# Patient Record
Sex: FEMALE | Race: WHITE | NOT HISPANIC OR LATINO | Employment: UNEMPLOYED | URBAN - METROPOLITAN AREA
[De-identification: names, ages, dates, MRNs, and addresses within clinical notes are randomized per-mention and may not be internally consistent; named-entity substitution may affect disease eponyms.]

---

## 2017-03-15 ENCOUNTER — HOSPITAL ENCOUNTER (OUTPATIENT)
Facility: HOSPITAL | Age: 67
Discharge: HOME HEALTH | End: 2017-03-24
Attending: PHYSICAL MEDICINE & REHABILITATION | Admitting: PHYSICAL MEDICINE & REHABILITATION

## 2017-03-16 LAB
ALBUMIN SERPL BCP-MCNC: 2.8 G/DL (ref 3.5–5)
ALP SERPL-CCNC: 104 U/L (ref 46–116)
ALT SERPL W P-5'-P-CCNC: 57 U/L (ref 12–78)
ANION GAP SERPL CALCULATED.3IONS-SCNC: 8 MMOL/L (ref 4–13)
AST SERPL W P-5'-P-CCNC: 51 U/L (ref 5–45)
BASOPHILS # BLD AUTO: 0.04 THOUSANDS/ΜL (ref 0–0.1)
BASOPHILS NFR BLD AUTO: 1 % (ref 0–1)
BILIRUB SERPL-MCNC: 0.9 MG/DL (ref 0.2–1)
BUN SERPL-MCNC: 14 MG/DL (ref 5–25)
CALCIUM SERPL-MCNC: 9.1 MG/DL (ref 8.3–10.1)
CHLORIDE SERPL-SCNC: 101 MMOL/L (ref 100–108)
CO2 SERPL-SCNC: 28 MMOL/L (ref 21–32)
CREAT SERPL-MCNC: 0.64 MG/DL (ref 0.6–1.3)
EOSINOPHIL # BLD AUTO: 0.28 THOUSAND/ΜL (ref 0–0.61)
EOSINOPHIL NFR BLD AUTO: 3 % (ref 0–6)
ERYTHROCYTE [DISTWIDTH] IN BLOOD BY AUTOMATED COUNT: 14.1 % (ref 11.6–15.1)
EST. AVERAGE GLUCOSE BLD GHB EST-MCNC: 114 MG/DL
GFR SERPL CREATININE-BSD FRML MDRD: >60 ML/MIN/1.73SQ M
GLUCOSE SERPL-MCNC: 122 MG/DL (ref 65–140)
HBA1C MFR BLD: 5.6 % (ref 4.2–6.3)
HCT VFR BLD AUTO: 32.7 % (ref 34.8–46.1)
HGB BLD-MCNC: 10.7 G/DL (ref 11.5–15.4)
LYMPHOCYTES # BLD AUTO: 1.92 THOUSANDS/ΜL (ref 0.6–4.47)
LYMPHOCYTES NFR BLD AUTO: 22 % (ref 14–44)
MCH RBC QN AUTO: 29.6 PG (ref 26.8–34.3)
MCHC RBC AUTO-ENTMCNC: 32.7 G/DL (ref 31.4–37.4)
MCV RBC AUTO: 91 FL (ref 82–98)
MONOCYTES # BLD AUTO: 0.64 THOUSAND/ΜL (ref 0.17–1.22)
MONOCYTES NFR BLD AUTO: 7 % (ref 4–12)
NEUTROPHILS # BLD AUTO: 5.94 THOUSANDS/ΜL (ref 1.85–7.62)
NEUTS SEG NFR BLD AUTO: 67 % (ref 43–75)
NRBC BLD AUTO-RTO: 0 /100 WBCS
PLATELET # BLD AUTO: 256 THOUSANDS/UL (ref 149–390)
PMV BLD AUTO: 9.9 FL (ref 8.9–12.7)
POTASSIUM SERPL-SCNC: 4.2 MMOL/L (ref 3.5–5.3)
PREALB SERPL-MCNC: 17.2 MG/DL (ref 18–40)
PROT SERPL-MCNC: 7 G/DL (ref 6.4–8.2)
RBC # BLD AUTO: 3.61 MILLION/UL (ref 3.81–5.12)
SODIUM SERPL-SCNC: 137 MMOL/L (ref 136–145)
WBC # BLD AUTO: 8.86 THOUSAND/UL (ref 4.31–10.16)

## 2017-03-16 PROCEDURE — 83036 HEMOGLOBIN GLYCOSYLATED A1C: CPT | Performed by: PHYSICAL MEDICINE & REHABILITATION

## 2017-03-16 PROCEDURE — 80053 COMPREHEN METABOLIC PANEL: CPT | Performed by: PHYSICAL MEDICINE & REHABILITATION

## 2017-03-16 PROCEDURE — 84134 ASSAY OF PREALBUMIN: CPT | Performed by: PHYSICAL MEDICINE & REHABILITATION

## 2017-03-16 PROCEDURE — 85025 COMPLETE CBC W/AUTO DIFF WBC: CPT | Performed by: PHYSICAL MEDICINE & REHABILITATION

## 2017-03-18 LAB
BACTERIA UR QL AUTO: ABNORMAL /HPF
BILIRUB UR QL STRIP: NEGATIVE
CLARITY UR: CLEAR
COLOR UR: YELLOW
GLUCOSE UR STRIP-MCNC: NEGATIVE MG/DL
HGB UR QL STRIP.AUTO: ABNORMAL
KETONES UR STRIP-MCNC: NEGATIVE MG/DL
LEUKOCYTE ESTERASE UR QL STRIP: ABNORMAL
NITRITE UR QL STRIP: NEGATIVE
NON-SQ EPI CELLS URNS QL MICRO: ABNORMAL /HPF
PH UR STRIP.AUTO: 6 [PH] (ref 4.5–8)
PROT UR STRIP-MCNC: NEGATIVE MG/DL
RBC #/AREA URNS AUTO: ABNORMAL /HPF
SP GR UR STRIP.AUTO: <=1.005 (ref 1–1.03)
UROBILINOGEN UR QL STRIP.AUTO: 0.2 E.U./DL
WBC #/AREA URNS AUTO: ABNORMAL /HPF
WBC CLUMPS # UR AUTO: PRESENT /UL

## 2017-03-18 PROCEDURE — 87186 SC STD MICRODIL/AGAR DIL: CPT | Performed by: PHYSICAL MEDICINE & REHABILITATION

## 2017-03-18 PROCEDURE — 81001 URINALYSIS AUTO W/SCOPE: CPT | Performed by: PHYSICAL MEDICINE & REHABILITATION

## 2017-03-18 PROCEDURE — 87086 URINE CULTURE/COLONY COUNT: CPT | Performed by: PHYSICAL MEDICINE & REHABILITATION

## 2017-03-18 PROCEDURE — 87077 CULTURE AEROBIC IDENTIFY: CPT | Performed by: PHYSICAL MEDICINE & REHABILITATION

## 2017-03-20 LAB
ALBUMIN SERPL BCP-MCNC: 2.7 G/DL (ref 3.5–5)
ALP SERPL-CCNC: 98 U/L (ref 46–116)
ALT SERPL W P-5'-P-CCNC: 35 U/L (ref 12–78)
ANION GAP SERPL CALCULATED.3IONS-SCNC: 9 MMOL/L (ref 4–13)
AST SERPL W P-5'-P-CCNC: 29 U/L (ref 5–45)
BACTERIA UR CULT: NORMAL
BASOPHILS # BLD AUTO: 0.05 THOUSANDS/ΜL (ref 0–0.1)
BASOPHILS NFR BLD AUTO: 1 % (ref 0–1)
BILIRUB SERPL-MCNC: 0.8 MG/DL (ref 0.2–1)
BUN SERPL-MCNC: 15 MG/DL (ref 5–25)
CALCIUM SERPL-MCNC: 8.7 MG/DL (ref 8.3–10.1)
CHLORIDE SERPL-SCNC: 103 MMOL/L (ref 100–108)
CO2 SERPL-SCNC: 26 MMOL/L (ref 21–32)
CREAT SERPL-MCNC: 0.53 MG/DL (ref 0.6–1.3)
EOSINOPHIL # BLD AUTO: 0.16 THOUSAND/ΜL (ref 0–0.61)
EOSINOPHIL NFR BLD AUTO: 2 % (ref 0–6)
ERYTHROCYTE [DISTWIDTH] IN BLOOD BY AUTOMATED COUNT: 14.6 % (ref 11.6–15.1)
GFR SERPL CREATININE-BSD FRML MDRD: >60 ML/MIN/1.73SQ M
GLUCOSE SERPL-MCNC: 103 MG/DL (ref 65–140)
HCT VFR BLD AUTO: 32.2 % (ref 34.8–46.1)
HGB BLD-MCNC: 10.6 G/DL (ref 11.5–15.4)
LYMPHOCYTES # BLD AUTO: 1.84 THOUSANDS/ΜL (ref 0.6–4.47)
LYMPHOCYTES NFR BLD AUTO: 19 % (ref 14–44)
MCH RBC QN AUTO: 29.9 PG (ref 26.8–34.3)
MCHC RBC AUTO-ENTMCNC: 32.9 G/DL (ref 31.4–37.4)
MCV RBC AUTO: 91 FL (ref 82–98)
MONOCYTES # BLD AUTO: 0.94 THOUSAND/ΜL (ref 0.17–1.22)
MONOCYTES NFR BLD AUTO: 10 % (ref 4–12)
NEUTROPHILS # BLD AUTO: 6.81 THOUSANDS/ΜL (ref 1.85–7.62)
NEUTS SEG NFR BLD AUTO: 69 % (ref 43–75)
NRBC BLD AUTO-RTO: 0 /100 WBCS
PLATELET # BLD AUTO: 409 THOUSANDS/UL (ref 149–390)
PMV BLD AUTO: 9.3 FL (ref 8.9–12.7)
POTASSIUM SERPL-SCNC: 3.8 MMOL/L (ref 3.5–5.3)
PROT SERPL-MCNC: 6.5 G/DL (ref 6.4–8.2)
RBC # BLD AUTO: 3.55 MILLION/UL (ref 3.81–5.12)
SODIUM SERPL-SCNC: 138 MMOL/L (ref 136–145)
WBC # BLD AUTO: 9.85 THOUSAND/UL (ref 4.31–10.16)

## 2017-03-20 PROCEDURE — 85025 COMPLETE CBC W/AUTO DIFF WBC: CPT | Performed by: INTERNAL MEDICINE

## 2017-03-20 PROCEDURE — 80053 COMPREHEN METABOLIC PANEL: CPT | Performed by: INTERNAL MEDICINE

## 2017-03-22 LAB
ANION GAP SERPL CALCULATED.3IONS-SCNC: 10 MMOL/L (ref 4–13)
BASOPHILS # BLD AUTO: 0.04 THOUSANDS/ΜL (ref 0–0.1)
BASOPHILS NFR BLD AUTO: 1 % (ref 0–1)
BUN SERPL-MCNC: 17 MG/DL (ref 5–25)
CALCIUM SERPL-MCNC: 8.8 MG/DL (ref 8.3–10.1)
CHLORIDE SERPL-SCNC: 103 MMOL/L (ref 100–108)
CO2 SERPL-SCNC: 25 MMOL/L (ref 21–32)
CREAT SERPL-MCNC: 0.68 MG/DL (ref 0.6–1.3)
EOSINOPHIL # BLD AUTO: 0.16 THOUSAND/ΜL (ref 0–0.61)
EOSINOPHIL NFR BLD AUTO: 3 % (ref 0–6)
ERYTHROCYTE [DISTWIDTH] IN BLOOD BY AUTOMATED COUNT: 14.8 % (ref 11.6–15.1)
GFR SERPL CREATININE-BSD FRML MDRD: >60 ML/MIN/1.73SQ M
GLUCOSE SERPL-MCNC: 101 MG/DL (ref 65–140)
HCT VFR BLD AUTO: 33.7 % (ref 34.8–46.1)
HGB BLD-MCNC: 11.1 G/DL (ref 11.5–15.4)
LYMPHOCYTES # BLD AUTO: 1.69 THOUSANDS/ΜL (ref 0.6–4.47)
LYMPHOCYTES NFR BLD AUTO: 28 % (ref 14–44)
MCH RBC QN AUTO: 30 PG (ref 26.8–34.3)
MCHC RBC AUTO-ENTMCNC: 32.9 G/DL (ref 31.4–37.4)
MCV RBC AUTO: 91 FL (ref 82–98)
MONOCYTES # BLD AUTO: 0.64 THOUSAND/ΜL (ref 0.17–1.22)
MONOCYTES NFR BLD AUTO: 11 % (ref 4–12)
NEUTROPHILS # BLD AUTO: 3.54 THOUSANDS/ΜL (ref 1.85–7.62)
NEUTS SEG NFR BLD AUTO: 58 % (ref 43–75)
NRBC BLD AUTO-RTO: 0 /100 WBCS
PLATELET # BLD AUTO: 485 THOUSANDS/UL (ref 149–390)
PMV BLD AUTO: 9.5 FL (ref 8.9–12.7)
POTASSIUM SERPL-SCNC: 4 MMOL/L (ref 3.5–5.3)
RBC # BLD AUTO: 3.7 MILLION/UL (ref 3.81–5.12)
SODIUM SERPL-SCNC: 138 MMOL/L (ref 136–145)
WBC # BLD AUTO: 6.11 THOUSAND/UL (ref 4.31–10.16)

## 2017-03-22 PROCEDURE — 85025 COMPLETE CBC W/AUTO DIFF WBC: CPT | Performed by: PHYSICAL MEDICINE & REHABILITATION

## 2017-03-22 PROCEDURE — 80048 BASIC METABOLIC PNL TOTAL CA: CPT | Performed by: PHYSICAL MEDICINE & REHABILITATION

## 2018-06-13 DIAGNOSIS — R92.2 DENSE BREAST: ICD-10-CM

## 2018-06-13 DIAGNOSIS — Z12.31 ENCOUNTER FOR SCREENING MAMMOGRAM FOR MALIGNANT NEOPLASM OF BREAST: Primary | ICD-10-CM

## 2018-06-13 DIAGNOSIS — Z78.0 POSTMENOPAUSAL: ICD-10-CM

## 2018-06-19 DIAGNOSIS — Z12.31 ENCOUNTER FOR SCREENING MAMMOGRAM FOR MALIGNANT NEOPLASM OF BREAST: Primary | ICD-10-CM

## 2018-08-22 PROBLEM — M81.0 OSTEOPOROSIS OF MULTIPLE SITES: Status: ACTIVE | Noted: 2018-08-22

## 2018-09-05 ENCOUNTER — OFFICE VISIT (OUTPATIENT)
Dept: OBGYN CLINIC | Age: 68
End: 2018-09-05
Payer: MEDICARE

## 2018-09-05 VITALS
HEIGHT: 66 IN | BODY MASS INDEX: 20.13 KG/M2 | DIASTOLIC BLOOD PRESSURE: 70 MMHG | SYSTOLIC BLOOD PRESSURE: 132 MMHG | WEIGHT: 125.25 LBS

## 2018-09-05 DIAGNOSIS — Z12.31 ENCOUNTER FOR SCREENING MAMMOGRAM FOR MALIGNANT NEOPLASM OF BREAST: ICD-10-CM

## 2018-09-05 DIAGNOSIS — M81.0 OSTEOPOROSIS OF MULTIPLE SITES: ICD-10-CM

## 2018-09-05 DIAGNOSIS — Z85.3 HISTORY OF BREAST CANCER: ICD-10-CM

## 2018-09-05 DIAGNOSIS — Z01.419 ENCOUNTER FOR GYNECOLOGICAL EXAMINATION WITHOUT ABNORMAL FINDING: Primary | ICD-10-CM

## 2018-09-05 PROCEDURE — 87624 HPV HI-RISK TYP POOLED RSLT: CPT | Performed by: OBSTETRICS & GYNECOLOGY

## 2018-09-05 PROCEDURE — G0145 SCR C/V CYTO,THINLAYER,RESCR: HCPCS | Performed by: OBSTETRICS & GYNECOLOGY

## 2018-09-05 PROCEDURE — G0101 CA SCREEN;PELVIC/BREAST EXAM: HCPCS | Performed by: OBSTETRICS & GYNECOLOGY

## 2018-09-05 NOTE — PROGRESS NOTES
Assessment/Plan:    Encounter for gynecological examination without abnormal finding  Pap/HPV collected today  Mammogram ordered  Colonoscopy current    DEXA - osteoporosis- patient is reluctant to start medication for bones  At this time strongly encourage calcium 1200 mg per day, vitamin D daily at least 800 units per day will check Vitamin d level  Patient is avid exerciser- should continue  Diagnoses and all orders for this visit:    Encounter for gynecological examination without abnormal finding  -     Liquid-based pap, screening  -     HPV High Risk; Future  -     HPV High Risk    Encounter for screening mammogram for malignant neoplasm of breast  -     Cancel: Mammo screening bilateral w cad; Future  -     Mammo screening bilateral w 3d & cad; Future    History of breast cancer  -     Mammo screening bilateral w 3d & cad; Future    Osteoporosis of multiple sites  -     DXA bone density spine hip and pelvis; Future  -     Vitamin D 25 hydroxy; Future    Other orders  -     Calcium Carb-Cholecalciferol (CALCIUM 1000 + D PO); Take 2,000 Units by mouth            Subjective:      Patient ID: Ailin Key is a 70 y o  female  Patient here for new patient yearly exam  Had some questions in regards to her bone density test has records with her but also in care everywhere  Age of first period: 5years old    Lmp: patient is post-menopausal   Last pap: 16 negative pap, hpv not done (due today)  Last mammo: 18 BR2 benign findings  Colonoscopy:  (due )  Dexa: 18 osteoporosis of the spine and hip (due in 1-2 years)  Patient is not a smoker  Patient has a glass of wine a night with meal   Patient exercises  Very concerned with osteoporosis diagnosis - does not want to start medications unless absolutely necessary  Reviewed available medications-bisphosphonates, prolia- patient would like to avoid meds if possible  She exercises regularly    Takes vitamin supplementation  Gynecologic Exam   The patient's pertinent negatives include no genital itching, genital lesions, genital odor, genital rash, pelvic pain, vaginal bleeding or vaginal discharge  The patient is experiencing no pain  Pertinent negatives include no chills, constipation, diarrhea, fever, frequency, nausea, sore throat, urgency or vomiting  She is sexually active  She is postmenopausal        The following portions of the patient's history were reviewed and updated as appropriate:   She  has no past medical history on file  She   Patient Active Problem List    Diagnosis Date Noted    Encounter for gynecological examination without abnormal finding 09/05/2018    Osteoporosis of multiple sites 08/22/2018     She  has a past surgical history that includes Tubal ligation and Femur Surgery (2017)  Her family history is not on file  She  reports that she has never smoked  She has never used smokeless tobacco  She reports that she drinks about 4 2 oz of alcohol per week   She reports that she does not use drugs  Current Outpatient Prescriptions   Medication Sig Dispense Refill    Calcium Carb-Cholecalciferol (CALCIUM 1000 + D PO) Take 2,000 Units by mouth         No current facility-administered medications for this visit  No current outpatient prescriptions on file prior to visit  No current facility-administered medications on file prior to visit  She is allergic to iodine; milk-related compounds; whey protein [protein]; and levaquin [levofloxacin]       Review of Systems   Constitutional: Negative for activity change, appetite change, chills, fatigue and fever  HENT: Negative for rhinorrhea, sneezing and sore throat  Eyes: Negative for visual disturbance  Respiratory: Negative for cough, shortness of breath and wheezing  Cardiovascular: Negative for chest pain, palpitations and leg swelling     Gastrointestinal: Negative for abdominal distention, constipation, diarrhea, nausea and vomiting  Genitourinary: Negative for difficulty urinating, frequency, pelvic pain, urgency and vaginal discharge  Neurological: Negative for syncope and light-headedness  Objective:      /70 (BP Location: Right arm, Patient Position: Sitting, Cuff Size: Standard)   Ht 5' 6" (1 676 m)   Wt 56 8 kg (125 lb 4 oz)   BMI 20 22 kg/m²          Physical Exam   Genitourinary: No breast swelling, tenderness, discharge or bleeding  There is no rash, tenderness, lesion or injury on the right labia  There is no rash, tenderness, lesion or injury on the left labia  Uterus is not deviated, not enlarged, not fixed and not tender  Cervix exhibits no motion tenderness, no discharge and no friability  Right adnexum displays no mass, no tenderness and no fullness  Left adnexum displays no mass, no tenderness and no fullness  No bleeding in the vagina  No vaginal discharge found     Genitourinary Comments: Thin, atrophic vaginal mucosa

## 2018-09-07 LAB
HPV HR 12 DNA CVX QL NAA+PROBE: NEGATIVE
HPV16 DNA CVX QL NAA+PROBE: NEGATIVE
HPV18 DNA CVX QL NAA+PROBE: NEGATIVE
LAB AP GYN PRIMARY INTERPRETATION: NORMAL
Lab: NORMAL

## 2018-09-10 ENCOUNTER — TELEPHONE (OUTPATIENT)
Dept: OBGYN CLINIC | Facility: CLINIC | Age: 68
End: 2018-09-10

## 2018-09-10 NOTE — TELEPHONE ENCOUNTER
----- Message from Surinder Mcmahan MD sent at 9/10/2018  5:02 PM EDT -----  Please call Katlyn- Vitamin D is within normal limits

## 2018-09-11 ENCOUNTER — TELEPHONE (OUTPATIENT)
Dept: OBGYN CLINIC | Age: 68
End: 2018-09-11

## 2018-09-11 NOTE — ASSESSMENT & PLAN NOTE
Pap/HPV collected today  Mammogram ordered  Colonoscopy current    DEXA - osteoporosis- patient is reluctant to start medication for bones  At this time strongly encourage calcium 1200 mg per day, vitamin D daily at least 800 units per day will check Vitamin d level  Patient is avid exerciser- should continue

## 2018-09-11 NOTE — PATIENT INSTRUCTIONS
Osteoporosis   WHAT YOU NEED TO KNOW:   What is osteoporosis? Osteoporosis is a long-term medical condition that causes your bones to become weak, brittle, and more likely to fracture  Osteoporosis occurs when your body absorbs more bone than it makes  It is also caused by a lack of calcium and estrogen (female hormone)  What increases my risk for osteoporosis? · Age older than 28    · Low estrogen levels    · Female gender    · Alcohol, tobacco, or caffeine    · Lack of calcium and vitamin D in your foods    · Lack of exercise    · Some illnesses, such as thyroid diseases, bone cancer, and long-term lung diseases    · Certain medicines, such as steroids, anticonvulsants, and blood-thinners  What are the signs and symptoms of osteoporosis? You may not have any signs or symptoms  You may break a bone after a muscle strain, bump, or fall  A break usually occurs in the hip, spine, or wrist  A collapsed vertebra (bone in your spine) may cause severe back pain or loss of height from bent posture  How is osteoporosis diagnosed? · Blood and urine tests  measure your calcium, vitamin D, and estrogen levels  · An x-ray or CT  may show thinned bones or a fracture  You may be given contrast liquid to help the bones show up better in the pictures  Tell the healthcare provider if you have ever had an allergic reaction to contrast liquid  Do not enter the MRI room with anything metal  Metal can cause serious injury  Tell the healthcare provider if you have any metal in or on your body  · A bone density test  compares your bone thickness with what is expected for someone of your age, gender, and ethnicity  How is osteoporosis treated? Medicines may be given to prevent bone loss, build new bone, and increase estrogen  These medicines help prevent fractures and may be given as a pill or injection  Ask your healthcare provider for more information on these medicines  How can I help prevent bone loss?    · Eat healthy foods that are high in calcium  This helps keep your bones strong  Good sources of calcium are milk, cheese, broccoli, tofu, almonds, and canned salmon and sardines  · Increase your vitamin D intake  Vitamin D is in fish oils, some vegetables, and fortified milk, cereal, and bread  Vitamin D is also formed in the skin when it is exposed to the sun  Ask your healthcare provider how much sunlight is safe for you  · Drink liquids as directed  Ask your healthcare provider how much liquid to drink each day and which liquids are best for you  Do not have alcohol or caffeine  They decrease bone mineral density, which can weaken your bones  · Exercise regularly  Ask your healthcare provider about the best exercise plan for you  Weight bearing exercise for 30 minutes, 3 times a week can help build and strengthen bone  · Do not smoke  Nicotine and other chemicals in cigarettes and cigars can cause lung damage  Ask your healthcare provider for information if you currently smoke and need help to quit  E-cigarettes or smokeless tobacco still contain nicotine  Talk to your healthcare provider before you use these products  · Go to physical therapy as directed  A physical therapist teaches you exercises to help improve movement and muscle strength  When should I seek immediate care? · You have severe pain  When should I contact my healthcare provider? · You have increasing pain after a fall  · You have pain when you do your daily activities  · You have questions or concerns about your condition or care  CARE AGREEMENT:   You have the right to help plan your care  Learn about your health condition and how it may be treated  Discuss treatment options with your caregivers to decide what care you want to receive  You always have the right to refuse treatment  The above information is an  only  It is not intended as medical advice for individual conditions or treatments  Talk to your doctor, nurse or pharmacist before following any medical regimen to see if it is safe and effective for you  © 2017 [employer] Information is for End User's use only and may not be sold, redistributed or otherwise used for commercial purposes  All illustrations and images included in CareNotes® are the copyrighted property of [employer]  or [employer]

## 2018-09-11 NOTE — TELEPHONE ENCOUNTER
Called patient to advise of normal pap & hpv results  Unable to physically speak with patient so left detailed voicemail advising patient of normal results  Advised if any other further questions or concerns to give our office a call back, [phone] office number provided

## 2018-09-11 NOTE — TELEPHONE ENCOUNTER
----- Message from Steen Sandhoff, MD sent at 9/10/2018  1:19 PM EDT -----  Please call patient  Pap and HPV are negative

## 2019-08-06 DIAGNOSIS — Z12.31 ENCOUNTER FOR SCREENING MAMMOGRAM FOR MALIGNANT NEOPLASM OF BREAST: ICD-10-CM

## 2019-08-06 DIAGNOSIS — Z85.3 HISTORY OF BREAST CANCER: ICD-10-CM

## 2020-07-16 ENCOUNTER — TELEPHONE (OUTPATIENT)
Dept: OBGYN CLINIC | Facility: CLINIC | Age: 70
End: 2020-07-16

## 2020-07-16 DIAGNOSIS — M81.0 OSTEOPOROSIS, UNSPECIFIED OSTEOPOROSIS TYPE, UNSPECIFIED PATHOLOGICAL FRACTURE PRESENCE: Primary | ICD-10-CM

## 2020-07-16 NOTE — TELEPHONE ENCOUNTER
Pt has DEXA scheduled at [facility] and needs script sent to them   Their phone number is:  [phone]

## 2020-08-11 DIAGNOSIS — M81.0 OSTEOPOROSIS, UNSPECIFIED OSTEOPOROSIS TYPE, UNSPECIFIED PATHOLOGICAL FRACTURE PRESENCE: ICD-10-CM

## 2020-08-12 ENCOUNTER — TELEPHONE (OUTPATIENT)
Dept: OBGYN CLINIC | Facility: CLINIC | Age: 70
End: 2020-08-12

## 2020-08-13 NOTE — TELEPHONE ENCOUNTER
----- Message from Migdalia Navarro MD sent at 8/12/2020 12:23 PM EDT -----  Please call Katlyn - her DEXA has been reviewed  There has been improvement in both her spine and hip  Spine is osteoporosis and hip is osteopenia but improvement is very reassuring  Encourage healthy diet, exercise, Calcium 1200mg per day and at least 800 iu Vitamin D daily 
I greg of this for pt
I would recommend that she contact her PCP or her rheumatologist if she has one to see if there is a connection with Prolia and rheumatoid arthritis  If we stop it her bones will weaken again and if she can tolerate the medication the recommendation would be to continue it 
Pt contacted and advised as directed  pt agreed to plan of action  Pt further more stated she is on the prolia injection every 6 months with her PCP and would like to know if she can take a step back from the injection or if provider Kathie Leonard believes it will hinder her progress and if it does hinder her progress by how much will it  Pt call back number is her cell  [phone]  Pt stated the reason for the step back is that she feels like the injections are interfering with her Rheumatoid arthritis  I advised pt I would recommend she ask her pcp as they are providing the treatment, but I will get message to provider as she is requesting a second opinion   Please advise
No

## 2020-08-31 ENCOUNTER — HOSPITAL ENCOUNTER (EMERGENCY)
Facility: HOSPITAL | Age: 70
Discharge: HOME | End: 2020-08-31
Attending: EMERGENCY MEDICINE
Payer: MEDICARE

## 2020-08-31 ENCOUNTER — APPOINTMENT (EMERGENCY)
Dept: RADIOLOGY | Facility: HOSPITAL | Age: 70
End: 2020-08-31
Payer: MEDICARE

## 2020-08-31 VITALS
OXYGEN SATURATION: 97 % | DIASTOLIC BLOOD PRESSURE: 87 MMHG | SYSTOLIC BLOOD PRESSURE: 149 MMHG | TEMPERATURE: 97.5 F | BODY MASS INDEX: 19.82 KG/M2 | RESPIRATION RATE: 18 BRPM | WEIGHT: 123.31 LBS | HEART RATE: 82 BPM | HEIGHT: 66 IN

## 2020-08-31 DIAGNOSIS — S81.801A WOUND OF RIGHT LOWER EXTREMITY, INITIAL ENCOUNTER: Primary | ICD-10-CM

## 2020-08-31 LAB
ANION GAP SERPL CALCULATED.3IONS-SCNC: 6 MMOL/L (ref 4–13)
BASOPHILS # BLD AUTO: 0.05 THOUSANDS/ΜL (ref 0–0.1)
BASOPHILS NFR BLD AUTO: 1 % (ref 0–1)
BUN SERPL-MCNC: 11 MG/DL (ref 5–25)
CALCIUM SERPL-MCNC: 9.8 MG/DL (ref 8.3–10.1)
CHLORIDE SERPL-SCNC: 101 MMOL/L (ref 100–108)
CO2 SERPL-SCNC: 30 MMOL/L (ref 21–32)
CREAT SERPL-MCNC: 0.8 MG/DL (ref 0.6–1.3)
EOSINOPHIL # BLD AUTO: 0.13 THOUSAND/ΜL (ref 0–0.61)
EOSINOPHIL NFR BLD AUTO: 2 % (ref 0–6)
ERYTHROCYTE [DISTWIDTH] IN BLOOD BY AUTOMATED COUNT: 13.5 % (ref 11.6–15.1)
GFR SERPL CREATININE-BSD FRML MDRD: 74 ML/MIN/1.73SQ M
GLUCOSE SERPL-MCNC: 117 MG/DL (ref 65–140)
HCT VFR BLD AUTO: 46.1 % (ref 34.8–46.1)
HGB BLD-MCNC: 15.1 G/DL (ref 11.5–15.4)
IMM GRANULOCYTES # BLD AUTO: 0.02 THOUSAND/UL (ref 0–0.2)
IMM GRANULOCYTES NFR BLD AUTO: 0 % (ref 0–2)
LACTATE SERPL-SCNC: 1.5 MMOL/L (ref 0.5–2)
LYMPHOCYTES # BLD AUTO: 2.5 THOUSANDS/ΜL (ref 0.6–4.47)
LYMPHOCYTES NFR BLD AUTO: 34 % (ref 14–44)
MCH RBC QN AUTO: 29.2 PG (ref 26.8–34.3)
MCHC RBC AUTO-ENTMCNC: 32.8 G/DL (ref 31.4–37.4)
MCV RBC AUTO: 89 FL (ref 82–98)
MONOCYTES # BLD AUTO: 0.76 THOUSAND/ΜL (ref 0.17–1.22)
MONOCYTES NFR BLD AUTO: 11 % (ref 4–12)
NEUTROPHILS # BLD AUTO: 3.8 THOUSANDS/ΜL (ref 1.85–7.62)
NEUTS SEG NFR BLD AUTO: 52 % (ref 43–75)
NRBC BLD AUTO-RTO: 0 /100 WBCS
PLATELET # BLD AUTO: 239 THOUSANDS/UL (ref 149–390)
PMV BLD AUTO: 9.9 FL (ref 8.9–12.7)
POTASSIUM SERPL-SCNC: 3.5 MMOL/L (ref 3.5–5.3)
RBC # BLD AUTO: 5.18 MILLION/UL (ref 3.81–5.12)
SODIUM SERPL-SCNC: 137 MMOL/L (ref 136–145)
WBC # BLD AUTO: 7.26 THOUSAND/UL (ref 4.31–10.16)

## 2020-08-31 PROCEDURE — 36415 COLL VENOUS BLD VENIPUNCTURE: CPT | Performed by: PHYSICIAN ASSISTANT

## 2020-08-31 PROCEDURE — 73590 X-RAY EXAM OF LOWER LEG: CPT

## 2020-08-31 PROCEDURE — 85025 COMPLETE CBC W/AUTO DIFF WBC: CPT | Performed by: PHYSICIAN ASSISTANT

## 2020-08-31 PROCEDURE — 80048 BASIC METABOLIC PNL TOTAL CA: CPT | Performed by: PHYSICIAN ASSISTANT

## 2020-08-31 PROCEDURE — 99285 EMERGENCY DEPT VISIT HI MDM: CPT | Performed by: PHYSICIAN ASSISTANT

## 2020-08-31 PROCEDURE — 96365 THER/PROPH/DIAG IV INF INIT: CPT

## 2020-08-31 PROCEDURE — 99283 EMERGENCY DEPT VISIT LOW MDM: CPT

## 2020-08-31 PROCEDURE — 83605 ASSAY OF LACTIC ACID: CPT | Performed by: PHYSICIAN ASSISTANT

## 2020-08-31 RX ORDER — CEFAZOLIN SODIUM 2 G/50ML
2000 SOLUTION INTRAVENOUS ONCE
Status: COMPLETED | OUTPATIENT
Start: 2020-08-31 | End: 2020-08-31

## 2020-08-31 RX ORDER — DOXYCYCLINE HYCLATE 100 MG/1
100 CAPSULE ORAL 2 TIMES DAILY
Qty: 14 CAPSULE | Refills: 0 | Status: SHIPPED | OUTPATIENT
Start: 2020-08-31 | End: 2020-09-07

## 2020-08-31 RX ORDER — BIOTIN 1 MG
1 TABLET ORAL 2 TIMES DAILY
COMMUNITY
End: 2021-10-04 | Stop reason: HOSPADM

## 2020-08-31 RX ORDER — MULTIVIT-MIN/IRON/FOLIC ACID/K 18-600-40
2000 CAPSULE ORAL DAILY
COMMUNITY
End: 2021-07-08

## 2020-08-31 RX ORDER — AZELASTINE HYDROCHLORIDE 0.5 MG/ML
1 SOLUTION/ DROPS OPHTHALMIC DAILY PRN
COMMUNITY
End: 2021-10-04 | Stop reason: HOSPADM

## 2020-08-31 RX ORDER — CEPHALEXIN 500 MG/1
500 CAPSULE ORAL EVERY 6 HOURS SCHEDULED
Qty: 28 CAPSULE | Refills: 0 | Status: SHIPPED | OUTPATIENT
Start: 2020-08-31 | End: 2020-09-07

## 2020-08-31 RX ORDER — DENOSUMAB 60 MG/ML
1 INJECTION SUBCUTANEOUS
COMMUNITY
End: 2021-05-07 | Stop reason: ALTCHOICE

## 2020-08-31 RX ADMIN — CEFAZOLIN SODIUM 2000 MG: 2 SOLUTION INTRAVENOUS at 07:49

## 2020-08-31 NOTE — ED NOTES
Pt verbalized understanding of discharge instructions, medications and f/u care     Mackenzie Jain RN  08/31/20 [id number]

## 2020-08-31 NOTE — ED PROVIDER NOTES
History  Chief Complaint   Patient presents with    Wound Check     R leg wound     76yo female with a history of osteoporosis presenting for evaluation of a right leg wound that has been present for the past 2 weeks  The wound is present on her right lower anterior leg  She states the wound started as a pimple that appeared similar to a blood blister  No known insect bites  Her wound started enlarging several days ago  She was seen by her PCP 3 days ago for the same  Patient was given a dose of IM Rocephin and prescribed Mupirocin ointment  A wound culture was obtained at the visit but is still pending  Patient states the wound appeared to be improving yesterday but when she woke up this morning, she noticed the area of erythema was enlarging  The wound is warm and painful  Patient reports a similar wound previously on her right arm  She was prescribed Bactrim at that time but developed an allergic reaction  Patient is very anxious regarding her symptoms because 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  Patient denies systemic symptoms including fevers, chills, vomiting  No prior history of MRSA         History provided by:  Patient   used: No    Rash   Location:  Leg  Leg rash location:  R lower leg  Quality: painful and redness    Quality: not draining    Pain details:     Quality:  Pressure    Severity:  Moderate    Onset quality:  Gradual    Duration:  2 weeks    Timing:  Constant    Progression:  Worsening  Severity:  Moderate  Onset quality:  Gradual  Duration:  2 weeks  Timing:  Constant  Progression:  Worsening  Chronicity:  New  Context: not insect bite/sting    Relieved by:  Nothing  Worsened by:  Nothing  Ineffective treatments:  Antibiotic cream (1 dose of IM Rocephin)  Associated symptoms: no abdominal pain, no diarrhea, no fatigue, no fever, no headaches, no joint pain, no myalgias, no nausea, no periorbital edema, no shortness of breath, no sore throat, no throat swelling, no tongue swelling, not vomiting and not wheezing        Prior to Admission Medications   Prescriptions Last Dose Informant Patient Reported? Taking? Calcium Carb-Cholecalciferol (CALCIUM 1000 + D PO) 8/30/2020 at Unknown time Self Yes Yes   Sig: Take 2,000 Units by mouth     Cholecalciferol (Vitamin D) 50 MCG (2000 UT) CAPS 8/30/2020 at Unknown time  Yes Yes   Sig: Take 2,000 mg by mouth daily   Cholecalciferol (Vitamin D3) 25 MCG (1000 UT) CAPS 8/30/2020 at Unknown time  Yes Yes   Sig: Take 1 capsule by mouth 2 (two) times a day   LUTEIN PO 8/30/2020 at Unknown time  Yes Yes   Sig: Take 1 tablet by mouth daily   Turmeric (QC TUMERIC COMPLEX PO) 8/30/2020 at Unknown time  Yes Yes   Sig: Take 1 capsule by mouth daily   azelastine (OPTIVAR) 0 05 % ophthalmic solution Past Month at Unknown time  Yes Yes   Sig: Administer 1 drop to both eyes daily as needed   denosumab (Prolia) 60 mg/mL More than a month at Unknown time  Yes No   Sig: Inject 1 Dose under the skin every 6 (six) months   mupirocin (BACTROBAN) 2 % ointment 8/30/2020 at Unknown time  Yes Yes   Sig: Apply 1 application topically 3 (three) times a day      Facility-Administered Medications: None       History reviewed  No pertinent past medical history  Past Surgical History:   Procedure Laterality Date    FEMUR SURGERY  2017    TUBAL LIGATION         History reviewed  No pertinent family history  I have reviewed and agree with the history as documented  E-Cigarette/Vaping    E-Cigarette Use Never User      E-Cigarette/Vaping Substances    Nicotine No     THC No     CBD No     Flavoring No     Other No     Unknown No      Social History     Tobacco Use    Smoking status: Never Smoker    Smokeless tobacco: Never Used   Substance Use Topics    Alcohol use:  Yes     Alcohol/week: 7 0 standard drinks     Types: 7 Glasses of wine per week     Comment: with dinner     Drug use: No       Review of Systems   Constitutional: Negative for chills, fatigue and fever  HENT: Negative for drooling, sore throat and voice change  Eyes: Negative for discharge and redness  Respiratory: Negative for shortness of breath, wheezing and stridor  Cardiovascular: Negative for chest pain and leg swelling  Gastrointestinal: Negative for abdominal pain, diarrhea, nausea and vomiting  Musculoskeletal: Negative for arthralgias, myalgias, neck pain and neck stiffness  Skin: Positive for rash and wound  Negative for color change  Neurological: Negative for seizures, syncope and headaches  Psychiatric/Behavioral: Negative for confusion  The patient is nervous/anxious  All other systems reviewed and are negative  Physical Exam  Physical Exam  Vitals signs and nursing note reviewed  Constitutional:       General: She is not in acute distress  Appearance: She is well-developed  She is not diaphoretic  Comments: Non-toxic appearing  Appears younger than stated age   HENT:      Head: Normocephalic and atraumatic  Right Ear: External ear normal       Left Ear: External ear normal       Nose: Nose normal    Eyes:      General: No scleral icterus  Right eye: No discharge  Left eye: No discharge  Conjunctiva/sclera: Conjunctivae normal    Neck:      Musculoskeletal: Normal range of motion and neck supple  Cardiovascular:      Rate and Rhythm: Normal rate and regular rhythm  Pulses:           Dorsalis pedis pulses are 2+ on the right side and 2+ on the left side  Heart sounds: Normal heart sounds  No murmur  Pulmonary:      Effort: Pulmonary effort is normal  No respiratory distress  Breath sounds: Normal breath sounds  No stridor  No wheezing or rales  Abdominal:      General: Bowel sounds are normal  There is no distension  Palpations: Abdomen is soft  Tenderness: There is no abdominal tenderness  There is no guarding  Musculoskeletal: Normal range of motion  General: No deformity  Lymphadenopathy:      Cervical: No cervical adenopathy  Skin:     General: Skin is warm and dry  Neurological:      Mental Status: She is alert  She is not disoriented  GCS: GCS eye subscore is 4  GCS verbal subscore is 5  GCS motor subscore is 6  Psychiatric:         Behavior: Behavior normal          Media Information      Document Information     Clinical Image - Mobile Device       08/31/2020 07:21    Attached To: Hospital Encounter on 8/31/20    Source Information     [facility]          Vital Signs  ED Triage Vitals [08/31/20 0716]   Temperature Pulse Respirations Blood Pressure SpO2   97 5 °F (36 4 °C) 103 18 149/87 99 %      Temp Source Heart Rate Source Patient Position - Orthostatic VS BP Location FiO2 (%)   Oral Monitor Sitting Left arm --      Pain Score       2           Vitals:    08/31/20 0716 08/31/20 0800 08/31/20 0830   BP: 149/87     Pulse: 103 86 82   Patient Position - Orthostatic VS: Sitting           Visual Acuity      ED Medications  Medications   ceFAZolin (ANCEF) IVPB (premix) 2,000 mg 50 mL (0 mg Intravenous Stopped 8/31/20 0830)       Diagnostic Studies  Results Reviewed     Procedure Component Value Units Date/Time    Lactic acid [[id number]]  (Normal) Collected:  08/31/20 0744    Lab Status:  Final result Specimen:  Blood from Arm, Left Updated:  08/31/20 4953     LACTIC ACID 1 5 mmol/L     Narrative:       Result may be elevated if tourniquet was used during collection      Basic metabolic panel [[id number]] Collected:  08/31/20 0744    Lab Status:  Final result Specimen:  Blood from Arm, Left Updated:  08/31/20 0813     Sodium 137 mmol/L      Potassium 3 5 mmol/L      Chloride 101 mmol/L      CO2 30 mmol/L      ANION GAP 6 mmol/L      BUN 11 mg/dL      Creatinine 0 80 mg/dL      Glucose 117 mg/dL      Calcium 9 8 mg/dL      eGFR 74 ml/min/1 73sq m     Narrative:       [place] guidelines for Chronic Kidney Disease (CKD):   Stage 1 with normal or high GFR (GFR > 90 mL/min/1 73 square meters)    Stage 2 Mild CKD (GFR = 60-89 mL/min/1 73 square meters)    Stage 3A Moderate CKD (GFR = 45-59 mL/min/1 73 square meters)    Stage 3B Moderate CKD (GFR = 30-44 mL/min/1 73 square meters)    Stage 4 Severe CKD (GFR = 15-29 mL/min/1 73 square meters)    Stage 5 End Stage CKD (GFR <15 mL/min/1 73 square meters)  Note: GFR calculation is accurate only with a steady state creatinine    CBC and differential [[id number]]  (Abnormal) Collected:  08/31/20 0744    Lab Status:  Final result Specimen:  Blood from Arm, Left Updated:  08/31/20 0758     WBC 7 26 Thousand/uL      RBC 5 18 Million/uL      Hemoglobin 15 1 g/dL      Hematocrit 46 1 %      MCV 89 fL      MCH 29 2 pg      MCHC 32 8 g/dL      RDW 13 5 %      MPV 9 9 fL      Platelets 360 Thousands/uL      nRBC 0 /100 WBCs      Neutrophils Relative 52 %      Immat GRANS % 0 %      Lymphocytes Relative 34 %      Monocytes Relative 11 %      Eosinophils Relative 2 %      Basophils Relative 1 %      Neutrophils Absolute 3 80 Thousands/µL      Immature Grans Absolute 0 02 Thousand/uL      Lymphocytes Absolute 2 50 Thousands/µL      Monocytes Absolute 0 76 Thousand/µL      Eosinophils Absolute 0 13 Thousand/µL      Basophils Absolute 0 05 Thousands/µL                  XR tibia fibula 2 views RIGHT   ED Interpretation by Claudine Levy PA-C (08/31 0202)   No acute osseous abnormality      Final Result by Yfn Chadwick MD (08/31 5773)      No acute displaced fracture   No lytic lesion or periosteal reaction      Workstation performed: [id number]                    Procedures  Procedures         ED Course       US AUDIT      Most Recent Value   Initial Alcohol Screen: US AUDIT-C    1  How often do you have a drink containing alcohol? 3 Filed at: 08/31/2020 0749   2  How many drinks containing alcohol do you have on a typical day you are drinking? 0 Filed at: 08/31/2020 0749   3b  FEMALE Any Age, or MALE 65+: How often do you have 4 or more drinks on one occassion? 0 Filed at: 08/31/2020 0749   Audit-C Score  3 Filed at: 08/31/2020 9773                    [facility]  Number of Diagnoses or Management Options  Wound of right lower extremity, initial encounter: new and requires workup  Diagnosis management comments: 76yo female presenting for a wound of her right lower extremity x 2 weeks  She was seen by her PCP 3 days ago for the same and was given IM Rocephin and Mupirocin ointment  The erythema initially improved after receiving the IM antibiotic but is now worsening  No fevers or chills  No history of MRSA  On exam, there is a central scab with surrounding erythema  No fluctuance, induration, or crepitus  Differential diagnosis includes but is not limited to: cellulitis, abscess, sepsis, no signs of NSTI on exam    Initial ED plan: Will check CBC, BMP, lactate, and x-ray  Administer dose of IV Ancef and re-evaluate  Final assessment: Labs overall unremarkable  White count and lactate normal, no evidence of sepsis  X-ray normal without any evidence of gas or periosteal reaction  No indication for admission at this time  Patient received a 1x dose of IM antibiotics so did not technically fail outpatient abx  Wound outlined with skin marker  Scripts provided for Keflex and doxycycline  Advised close PCP follow-up  ED return precautions discussed including fevers, chills, or spreading erythema  Patient expressed understanding and is agreeable to plan  Patient discharged in stable condition           Amount and/or Complexity of Data Reviewed  Clinical lab tests: ordered and reviewed  Tests in the radiology section of CPT®: ordered and reviewed  Review and summarize past medical records: yes  Independent visualization of images, tracings, or specimens: yes    Risk of Complications, Morbidity, and/or Mortality  Presenting problems: moderate  Diagnostic procedures: moderate  Management options: moderate    Patient Progress  Patient progress: stable        Disposition  Final diagnoses:   Wound of right lower extremity, initial encounter     Time reflects when diagnosis was documented in both MDM as applicable and the Disposition within this note     Time User Action Codes Description Comment    8/31/2020  8:18 AM Otf Nieto [S81 801A] Wound of right lower extremity, initial encounter       ED Disposition     ED Disposition Condition Date/Time Comment    Discharge Stable Mon Aug 31, 2020  8:32 AM Katlyn Gordon discharge to home/self care  Follow-up Information     Follow up With Specialties Details Why Contact Info Additional Information    Gladys Bernstein MD Family Medicine Schedule an appointment as soon as possible for a visit   [address]  [address] Emergency Department Emergency Medicine  If symptoms worsen [address] [facility] [address] [phone] [place] ED, [address] [facility], [place], [place], [address]          Discharge Medication List as of 8/31/2020  8:34 AM      START taking these medications    Details   cephalexin (KEFLEX) 500 mg capsule Take 1 capsule (500 mg total) by mouth every 6 (six) hours for 7 days, Starting Mon 8/31/2020, Until Mon 9/7/2020, Print      doxycycline hyclate (VIBRAMYCIN) 100 mg capsule Take 1 capsule (100 mg total) by mouth 2 (two) times a day for 7 days, Starting Mon 8/31/2020, Until Mon 9/7/2020, Print         CONTINUE these medications which have NOT CHANGED    Details   azelastine (OPTIVAR) 0 05 % ophthalmic solution Administer 1 drop to both eyes daily as needed, Historical Med      Calcium Carb-Cholecalciferol (CALCIUM 1000 + D PO) Take 2,000 Units by mouth  , Historical Med      !! Cholecalciferol (Vitamin D) 50 MCG (2000 UT) CAPS Take 2,000 mg by mouth daily, Historical Med      !!  Cholecalciferol (Vitamin D3) 25 MCG (1000 UT) CAPS Take 1 capsule by mouth 2 (two) times a day, Historical Med      LUTEIN PO Take 1 tablet by mouth daily, Historical Med      mupirocin (BACTROBAN) 2 % ointment Apply 1 application topically 3 (three) times a day, Historical Med      Turmeric (QC TUMERIC COMPLEX PO) Take 1 capsule by mouth daily, Historical Med      denosumab (Prolia) 60 mg/mL Inject 1 Dose under the skin every 6 (six) months, Historical Med       !! - Potential duplicate medications found  Please discuss with provider  No discharge procedures on file      PDMP Review     None          ED Provider  Electronically Signed by           Moriah Sam PA-C  08/31/20 2674

## 2020-09-14 ENCOUNTER — ANNUAL EXAM (OUTPATIENT)
Dept: OBGYN CLINIC | Facility: CLINIC | Age: 70
End: 2020-09-14
Payer: MEDICARE

## 2020-09-14 VITALS
SYSTOLIC BLOOD PRESSURE: 160 MMHG | TEMPERATURE: 97.7 F | DIASTOLIC BLOOD PRESSURE: 70 MMHG | WEIGHT: 124 LBS | BODY MASS INDEX: 20.01 KG/M2

## 2020-09-14 DIAGNOSIS — Z12.31 ENCOUNTER FOR SCREENING MAMMOGRAM FOR MALIGNANT NEOPLASM OF BREAST: ICD-10-CM

## 2020-09-14 DIAGNOSIS — Z01.419 ENCOUNTER FOR GYNECOLOGICAL EXAMINATION WITHOUT ABNORMAL FINDING: Primary | ICD-10-CM

## 2020-09-14 PROCEDURE — G0101 CA SCREEN;PELVIC/BREAST EXAM: HCPCS | Performed by: OBSTETRICS & GYNECOLOGY

## 2020-09-14 RX ORDER — METHENAMINE HIPPURATE 1000 MG/1
1 TABLET ORAL 2 TIMES DAILY
COMMUNITY
Start: 2020-08-28 | End: 2021-07-08

## 2020-09-14 RX ORDER — CLINDAMYCIN HYDROCHLORIDE 300 MG/1
CAPSULE ORAL
COMMUNITY
Start: 2020-09-09 | End: 2021-05-07 | Stop reason: ALTCHOICE

## 2020-09-14 RX ORDER — CEFTRIAXONE 1 G/1
INJECTION, POWDER, FOR SOLUTION INTRAMUSCULAR; INTRAVENOUS
COMMUNITY
Start: 2020-09-02 | End: 2021-05-27

## 2020-09-14 NOTE — PROGRESS NOTES
Assessment/Plan:    Encounter for gynecological examination without abnormal finding  Pap/HPV current  Mammogram ordered  Colonoscopy current  DEXA current    Encourage healthy diet, exercise, Calcium 1200mg per day and at least 800 iu Vitamin D daily  Diagnoses and all orders for this visit:    Encounter for gynecological examination without abnormal finding    Encounter for screening mammogram for malignant neoplasm of breast  -     Mammo screening bilateral w 3d & cad; Future    Other orders  -     cefTRIAXone (ROCEPHIN) 1 g injection  -     clindamycin (CLEOCIN) 300 MG capsule  -     methenamine hippurate (HIPREX) 1 g tablet; Take 1 g by mouth 2 (two) times a day  -     DOXYCYCLINE PO; Take by mouth          Subjective:      Patient ID: Yeni Murphy is a 69 y o  female  Patient is here today for annual exam  No concerns or complaints at this time   /   Pap - 18; negative -HPV  3D Mammo - 20; BR2  Colonoscopy -  (due )  Dexa - 20; spine - osteoporosis / hip - osteopenia  Patient is not a smoker  Patient rarely consumes alcohol - 1 glass of wine/week  Patient exercises  Pelvic organ prolapse - present  No pain, no difficulty urinating  Caring for her grandson omar  While her daughter is at work (teacher) Omar is 17 months  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"    Gynecologic Exam   The patient's pertinent negatives include no genital itching, genital lesions, genital odor, genital rash, pelvic pain, vaginal bleeding or vaginal discharge  The patient is experiencing no pain  Pertinent negatives include no chills, constipation, diarrhea, fever, frequency, nausea, painful intercourse, sore throat, urgency or vomiting  She is sexually active  She is postmenopausal        The following portions of the patient's history were reviewed and updated as appropriate: She  has no past medical history on file       Review of Systems Constitutional: Negative for activity change, appetite change, chills, fatigue and fever  HENT: Negative for rhinorrhea, sneezing and sore throat  Eyes: Negative for visual disturbance  Respiratory: Negative for cough, shortness of breath and wheezing  Cardiovascular: Negative for chest pain, palpitations and leg swelling  Gastrointestinal: Negative for abdominal distention, constipation, diarrhea, nausea and vomiting  Genitourinary: Negative for difficulty urinating, frequency, pelvic pain, urgency and vaginal discharge  Neurological: Negative for syncope and light-headedness  Objective:      /70 (BP Location: Right arm, Patient Position: Sitting, Cuff Size: Standard)   Temp 97 7 °F (36 5 °C) (Temporal)   Wt 56 2 kg (124 lb)   BMI 20 01 kg/m²          Physical Exam  Constitutional:       General: She is not in acute distress  Appearance: She is well-developed  She is not diaphoretic  Chest:      Breasts: Breasts are symmetrical          Right: No inverted nipple, mass, nipple discharge, skin change or tenderness  Left: No inverted nipple, mass, nipple discharge, skin change or tenderness  Abdominal:      General: Bowel sounds are normal  There is no distension  Palpations: Abdomen is soft  There is no mass  Tenderness: There is no abdominal tenderness  There is no guarding or rebound  Genitourinary:     Labia:         Right: No rash, tenderness, lesion or injury  Left: No rash, tenderness, lesion or injury  Vagina: No vaginal discharge or bleeding  Cervix: No cervical motion tenderness, discharge or friability  Uterus: Not deviated, not enlarged, not fixed and not tender  Adnexa:         Right: No mass, tenderness or fullness  Left: No mass, tenderness or fullness          Comments: Urethral meatus- no lesions, non tender  Urethra non tender  Bladder non tender  Thin, atrophic vaginal mucosa    Cystocele and apical prolapse to the introitus  Skin:     General: Skin is warm and dry  Coloration: Skin is not pale  Findings: No erythema or rash

## 2020-09-14 NOTE — PATIENT INSTRUCTIONS

## 2020-09-14 NOTE — ASSESSMENT & PLAN NOTE
Pap/HPV current  Mammogram ordered  Colonoscopy current  DEXA current    Encourage healthy diet, exercise, Calcium 1200mg per day and at least 800 iu Vitamin D daily

## 2021-05-07 ENCOUNTER — PROCEDURE VISIT (OUTPATIENT)
Dept: OBGYN CLINIC | Facility: CLINIC | Age: 71
End: 2021-05-07
Payer: MEDICARE

## 2021-05-07 VITALS
WEIGHT: 123.4 LBS | DIASTOLIC BLOOD PRESSURE: 80 MMHG | HEIGHT: 66 IN | SYSTOLIC BLOOD PRESSURE: 150 MMHG | BODY MASS INDEX: 19.83 KG/M2

## 2021-05-07 DIAGNOSIS — Z87.42 H/O UTERINE PROLAPSE: ICD-10-CM

## 2021-05-07 DIAGNOSIS — N95.0 PMB (POSTMENOPAUSAL BLEEDING): Primary | ICD-10-CM

## 2021-05-07 PROCEDURE — 88341 IMHCHEM/IMCYTCHM EA ADD ANTB: CPT | Performed by: PATHOLOGY

## 2021-05-07 PROCEDURE — 88342 IMHCHEM/IMCYTCHM 1ST ANTB: CPT | Performed by: PATHOLOGY

## 2021-05-07 PROCEDURE — 88305 TISSUE EXAM BY PATHOLOGIST: CPT | Performed by: PATHOLOGY

## 2021-05-07 PROCEDURE — 58100 BIOPSY OF UTERUS LINING: CPT | Performed by: NURSE PRACTITIONER

## 2021-05-07 NOTE — PATIENT INSTRUCTIONS
Endometrial Biopsy   WHAT YOU NEED TO KNOW:   Endometrial biopsy is a procedure to remove a tissue sample from the lining of your uterus  This procedure is done through your vagina  DISCHARGE INSTRUCTIONS:   Call your doctor or surgeon if:   · You have severe pain that does not go away after you take pain medicine  · You have a fever  · You have pain or cramping that lasts longer than a few days  · You have white or yellow vaginal discharge  · You have more vaginal bleeding than you were told to expect  · You have questions or concerns about your condition or care  Medicines:   · NSAIDs , such as ibuprofen, help decrease swelling, pain, and fever  NSAIDs can cause stomach bleeding or kidney problems in certain people  If you take blood thinner medicine, always ask your healthcare provider if NSAIDs are safe for you  Always read the medicine label and follow directions  · Take your medicine as directed  Contact your healthcare provider if you think your medicine is not helping or if you have side effects  Tell him or her if you are allergic to any medicine  Keep a list of the medicines, vitamins, and herbs you take  Include the amounts, and when and why you take them  Bring the list or the pill bottles to follow-up visits  Carry your medicine list with you in case of an emergency  Do not have sex, douche, or use a tampon  for at least 10 to 14 days  Follow up with your doctor or surgeon as directed:  Write down your questions so you remember to ask them during your visits  © Copyright [address] Information is for End User's use only and may not be sold, redistributed or otherwise used for commercial purposes  All illustrations and images included in CareNotes® are the copyrighted property of [employer]  or [facility]  The above information is an  only  It is not intended as medical advice for individual conditions or treatments   Talk to your doctor, nurse or pharmacist before following any medical regimen to see if it is safe and effective for you

## 2021-05-07 NOTE — PROGRESS NOTES
Endometrial biopsy    Date/Time: 5/7/2021 7:54 AM  Performed by: LAUREL Mott  Authorized by: LAUREL Mott   Universal Protocol:  Consent: Written consent obtained  Risks and benefits: risks, benefits and alternatives were discussed  Consent given by: patient  Patient understanding: patient states understanding of the procedure being performed  Patient consent: the patient's understanding of the procedure matches consent given  Procedure consent: procedure consent matches procedure scheduled  Required items: required blood products, implants, devices, and special equipment available  Patient identity confirmed: verbally with patient      Indication:     Indications: Post-menopausal bleeding      Chronicity of post-menopausal bleeding:  New    Progression of post-menopausal bleeding:  Unchanged  Pre-procedure:     Premeds:  Ibuprofen  Procedure:     Procedure: endometrial biopsy with Pipelle      A bivalve speculum was placed in the vagina: yes      Cervix cleaned and prepped: yes      Local anesthetic:  Lidocaine with epinephrine    The cervix was dilated: yes      Uterus sounded: yes      Uterus sound depth (cm):  6 5  Findings:     Uterus size:  6-8 weeks    Cervix: stenotic    Comments:     Procedure comments:  Pleasant 66 y o female presents for EMB after having PMB which started 3 months ago ago  It stopped for a little while then returned last month and she was spotting almost every day  She has a history of Breast Cancer in 2007 with lumpectomy and radiation and 5 years ago diagnosed with uterine prolapse  She believes the bleeding is r/t to the having the prolapse  Explained due to her breast cancer hx will have to r/o malignancy first then can check for other most likely differentials like her prolapse  She verbalized understanding  Pelvic ultrasound ordered as well  Tolerated procedure well  No bleeding from tenaculum site  Scant bleeding from Os after procedure completed    All questions and concerns answered  Reviewed if having any Fever, heavy bleeding or pelvic pain to call

## 2021-05-14 ENCOUNTER — TELEMEDICINE (OUTPATIENT)
Dept: OTHER | Facility: HOSPITAL | Age: 71
End: 2021-05-14

## 2021-05-14 ENCOUNTER — TELEPHONE (OUTPATIENT)
Dept: OBGYN CLINIC | Facility: CLINIC | Age: 71
End: 2021-05-14

## 2021-05-14 DIAGNOSIS — C54.1 ENDOMETRIAL CANCER (HCC): Primary | ICD-10-CM

## 2021-05-14 PROCEDURE — 99024 POSTOP FOLLOW-UP VISIT: CPT | Performed by: OBSTETRICS & GYNECOLOGY

## 2021-05-14 NOTE — QUICK NOTE
Attempted to call to review biopsy results  Voicemail only John is not yet active  Will continue to reach out until I am able to speak with her directly  Recommend consultation with gyn onc for further care

## 2021-05-15 ENCOUNTER — TELEPHONE (OUTPATIENT)
Dept: OTHER | Facility: OTHER | Age: 71
End: 2021-05-15

## 2021-05-15 PROBLEM — C54.1 ENDOMETRIAL CANCER (HCC): Status: ACTIVE | Noted: 2021-05-15

## 2021-05-15 NOTE — TELEPHONE ENCOUNTER
PT  Called in for a message for Dr Kya Rutledge, states the referral to [address] [place] [address] can be sooner than Friday for an appointment

## 2021-05-15 NOTE — PROGRESS NOTES
Able to speak with [facility] [place] today  Reviewed results  Referal to gyn onc placed  She is not currently bleeding  No pain  She is concerned about need for surgery and her role in care for her grandson  Plan for first office visit with gyn onc for more information and possible plan of care   Then she can start to plan for surgery/recovery etc

## 2021-05-17 ENCOUNTER — TELEPHONE (OUTPATIENT)
Dept: SURGICAL ONCOLOGY | Facility: CLINIC | Age: 71
End: 2021-05-17

## 2021-05-17 NOTE — TELEPHONE ENCOUNTER
New Patient Encounter    New Patient Intake Form   Patient Details:  Yeni Murphy  1950  [id number]    Background Information:  [address] starts by opening a telephone encounter and gathering the following information   Who is calling to schedule? If not self, relationship to patient? 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 Yes   When was the diagnosis? 5/7   Is there a confirmed diagnosis from a biopsy/tissue reviewed by pathology? Yes   Were outside slides requested? No   Is patient aware of diagnosis? Yes   Is there a personal history and what kind? No   Is there a family history and what kind? No   Reason for visit? New Diagnosis   Have you had any imaging or labs done? If so: when, where? yes  [facility]   Are records in [facility]? yes   If patient has a prior history of breast cancer were old records obtained? NA   Was the patient told to bring a disk? No   Does the patient smoke or Vape? No   If yes, how many packs or cartridges per day? na   Scheduling Information:   Preferred [place]:  [place]     Are there any dates/time the patient cannot be seen? na   Miscellaneous: na   After completing the above information, please route to Financial Counselor and the appropriate Nurse Navigator for review

## 2021-05-19 ENCOUNTER — CONSULT (OUTPATIENT)
Dept: GYNECOLOGIC ONCOLOGY | Facility: CLINIC | Age: 71
End: 2021-05-19
Payer: MEDICARE

## 2021-05-19 VITALS
DIASTOLIC BLOOD PRESSURE: 82 MMHG | TEMPERATURE: 98.2 F | RESPIRATION RATE: 18 BRPM | HEART RATE: 104 BPM | HEIGHT: 66 IN | WEIGHT: 121.5 LBS | BODY MASS INDEX: 19.53 KG/M2 | SYSTOLIC BLOOD PRESSURE: 130 MMHG

## 2021-05-19 DIAGNOSIS — C54.1 ENDOMETRIAL CANCER (HCC): ICD-10-CM

## 2021-05-19 PROCEDURE — 99205 OFFICE O/P NEW HI 60 MIN: CPT | Performed by: OBSTETRICS & GYNECOLOGY

## 2021-05-19 RX ORDER — ENOXAPARIN SODIUM 300 MG/3ML
40 INJECTION INTRAVENOUS; SUBCUTANEOUS
Status: CANCELLED | OUTPATIENT
Start: 2021-05-20 | End: 2021-05-21

## 2021-05-19 RX ORDER — ACETAMINOPHEN 325 MG/1
975 TABLET ORAL ONCE
Status: CANCELLED | OUTPATIENT
Start: 2021-05-19 | End: 2021-05-19

## 2021-05-19 RX ORDER — CLINDAMYCIN PHOSPHATE 900 MG/50ML
900 INJECTION INTRAVENOUS ONCE
Status: CANCELLED | OUTPATIENT
Start: 2021-06-03 | End: 2021-05-19

## 2021-05-19 RX ORDER — GABAPENTIN 100 MG/1
100 CAPSULE ORAL ONCE
Status: CANCELLED | OUTPATIENT
Start: 2021-05-19 | End: 2021-05-19

## 2021-05-19 NOTE — H&P (VIEW-ONLY)
Assessment/Plan:    Problem List Items Addressed This Visit        Genitourinary    Endometrial cancer [facility])     The patient has a new diagnosis of endometrial adenocarcinoma by her most recent biopsy  This appears to be early stage by clinical exam   We have discussed treatment options for this disease including chemotherapy, radiation therapy, and hormonal management  I have stated that medical evidence indicates that at this point to surgical management is her best option  I have discussed also the risks and benefits of lymph node biopsy at the time of surgery  We have discussed open versus laparoscopic versus robotic approach and have recommended the following:    Robotically assisted total laparoscopic hysterectomy bilateral salpingo-oophorectomy with sentinel lymph node biopsy  Have discussed risks and benefits of the procedure including bleeding requiring transfusion infection, infection, damage to local structures including bowel bladder ureter and other local organs  We discussed the risk of deep venous thrombosis  All of these complications are in the 2-4% range of likelihood  An open procedure may be required  The patient understands the risks and benefits of the procedure and has signed an informed consent  I personally signed the consent form with her  She does understand that further treatment including chemotherapy radiation therapy or hormones may be required based on the final postoperative pathologic diagnosis and staging  Standard preoperative testing including type and screen is CBC CPM P chest x-ray and EKG will be ordered  A CT scan of the chest abdomen and pelvis will be ordered due to the high-grade nature of this lesion      Overall consultation took 60 min with greater than 50% in dedicated toward discussion time  The patient is considering sentinel lymph node mapping and biopsy however is unsure and will decide by the day of surgery           Relevant Orders    CT chest abdomen pelvis wo contrast    Case request operating room: HYSTERECTOMY LAPAROSCOPIC TOTAL ([address])   Bilateral salpingo-oophorectomy possible sentinel lymph node mapping and biopsyW/ ROBOTICS    Type and screen    CBC and differential    Comprehensive metabolic panel    HEMOGLOBIN A1C W/ EAG ESTIMATION    EKG 12 lead    XR chest pa & lateral              CHIEF COMPLAINT: New diagnosis grade 3 endometrial cancer      Subjective:     Problem:  Cancer Staging  No matching staging information was found for the patient  Previous therapy:  Oncology History   Endometrial cancer ([facility] [address] )   5/15/2021 Initial Diagnosis    Endometrial cancer ([facility] [address] )     5/19/2021 -  Cancer Staged    Staging form: Corpus Uteri - Carcinoma, AJCC 8th Edition  - Clinical: FIGO Stage I (cT1, cN0, cM0) - Signed by Verenice Mora MD on 5/19/2021  Histologic grade (G): G3  Histologic grading system: 3 grade system             Patient ID: Deforest Bamberger is a 66 y o  female    Patient is a very pleasant 41-year-old female [facility]  Regarding evaluation and management of newly diagnosed endometrial cancer  The patient was or new in her usual state of health when she began to developed postmenopausal vaginal bleeding/dicharge  over the course of the past few wemonths  She had no other symptoms  She sought care and underwent an endometrial biopsy which reveals the following  Final Diagnosis  A  Endometrium (biopsy):  - High-grade carcinoma with necrosis  - See comment  Comment:  - The history of breast cancer is noted  - Ancillary studies are non-specific regarding site of origin  In the absence of extra-uterine disease, the findings may be compatible with  a primary endometrial carcinoma  Clinical / radiologic correlation is required  This is an appended report  These results have been appended to a previously preliminary verified report         last Pap smear in 2018 was negative with negative HPV high-risk subtypes      The patient does have a history of stage IA  breast cancer which was treated with lumpectomy radiation therapy and tamoxifen in   Today, the patient is doing well  She denies significant abdominal pain, pelvic pain, nausea, vomiting, constipation, diarrhea, fevers, chills, or vaginal bleeding  Review of Systems   Genitourinary: Positive for vaginal discharge  Current Outpatient Medications   Medication Sig Dispense Refill    azelastine (OPTIVAR) 0 05 % ophthalmic solution Administer 1 drop to both eyes daily as needed      Calcium Carb-Cholecalciferol (CALCIUM 1000 + D PO) Take 2,000 Units by mouth        cefTRIAXone (ROCEPHIN) 1 g injection       Cholecalciferol (Vitamin D) 50 MCG (2000 UT) CAPS Take 2,000 mg by mouth daily      Cholecalciferol (Vitamin D3) 25 MCG (1000 UT) CAPS Take 1 capsule by mouth 2 (two) times a day      LUTEIN PO Take 1 tablet by mouth daily      methenamine hippurate (HIPREX) 1 g tablet Take 1 g by mouth 2 (two) times a day      Turmeric (QC TUMERIC COMPLEX PO) Take 1 capsule by mouth daily       No current facility-administered medications for this visit          Allergies   Allergen Reactions    Bactrim [Sulfamethoxazole-Trimethoprim] Hives    Iodine - Food Allergy Swelling    Shellfish Allergy - Food Allergy Vomiting    Cephalexin Rash    Levaquin [Levofloxacin] Rash    Milk-Related Compounds - Food Allergy GI Intolerance    Whey Protein [Protein] Other (See Comments) and GI Intolerance     Flu symptoms         Past Medical History:   Diagnosis Date    Breast cancer [facility])        Past Surgical History:   Procedure Laterality Date    FEMUR SURGERY  2017    TUBAL LIGATION         OB History        1    Para   1    Term                AB        Living   1       SAB        TAB        Ectopic        Multiple        Live Births   1                 Family History   Problem Relation Age of Onset    Ovarian cancer Neg Hx     Cervical cancer Neg Hx     Uterine cancer Neg Hx        The following portions of the patient's history were reviewed and updated as appropriate: allergies, current medications, past medical history, past social history, past surgical history and problem list       Objective:    Blood pressure 130/82, pulse 104, temperature 98 2 °F (36 8 °C), resp  rate 18, height 5' 6" (1 676 m), weight 55 1 kg (121 lb 8 oz)  Body mass index is 19 61 kg/m²  Physical Exam  Constitutional:       Appearance: She is well-developed  HENT:      Head: Normocephalic and atraumatic  Eyes:      Pupils: Pupils are equal, round, and reactive to light  Neck:      Musculoskeletal: Normal range of motion and neck supple  Cardiovascular:      Rate and Rhythm: Normal rate and regular rhythm  Heart sounds: Normal heart sounds  Pulmonary:      Effort: Pulmonary effort is normal  No respiratory distress  Breath sounds: Normal breath sounds  Abdominal:      General: Bowel sounds are normal  There is no distension  Palpations: Abdomen is soft  Abdomen is not rigid  Tenderness: There is no abdominal tenderness  There is no guarding or rebound  Genitourinary:     Comments: -Normal external female genitalia, normal Bartholin's and [place]'s glands                  -Normal midline urethral meatus  No lesions notes                  -Bladder without fullness mass or tenderness                  -Vagina without lesion or discharge No significant cystocele or rectocele noted                  -Cervix normal appearing without visible lesions                  -Uterus with normal contour, mobility  No tenderness,                  -Adnexae without  mass or tenderness                  - Anus without fissure of lesion    Musculoskeletal: Normal range of motion  Lymphadenopathy:      Cervical: No cervical adenopathy  Upper Body:      Right upper body: No supraclavicular adenopathy  Left upper body: No supraclavicular adenopathy  Skin:     General: Skin is warm and dry  Neurological:      Mental Status: She is alert and oriented to person, place, and time     Psychiatric:         Behavior: Behavior normal

## 2021-05-19 NOTE — PROGRESS NOTES
Assessment/Plan:    Problem List Items Addressed This Visit        Genitourinary    Endometrial cancer [facility])     The patient has a new diagnosis of endometrial adenocarcinoma by her most recent biopsy  This appears to be early stage by clinical exam   We have discussed treatment options for this disease including chemotherapy, radiation therapy, and hormonal management  I have stated that medical evidence indicates that at this point to surgical management is her best option  I have discussed also the risks and benefits of lymph node biopsy at the time of surgery  We have discussed open versus laparoscopic versus robotic approach and have recommended the following:    Robotically assisted total laparoscopic hysterectomy bilateral salpingo-oophorectomy with sentinel lymph node biopsy  Have discussed risks and benefits of the procedure including bleeding requiring transfusion infection, infection, damage to local structures including bowel bladder ureter and other local organs  We discussed the risk of deep venous thrombosis  All of these complications are in the 2-4% range of likelihood  An open procedure may be required  The patient understands the risks and benefits of the procedure and has signed an informed consent  I personally signed the consent form with her  She does understand that further treatment including chemotherapy radiation therapy or hormones may be required based on the final postoperative pathologic diagnosis and staging  Standard preoperative testing including type and screen is CBC CPM P chest x-ray and EKG will be ordered  A CT scan of the chest abdomen and pelvis will be ordered due to the high-grade nature of this lesion      Overall consultation took 60 min with greater than 50% in dedicated toward discussion time  The patient is considering sentinel lymph node mapping and biopsy however is unsure and will decide by the day of surgery           Relevant Orders    CT chest abdomen pelvis wo contrast    Case request operating room: HYSTERECTOMY LAPAROSCOPIC TOTAL ([address])   Bilateral salpingo-oophorectomy possible sentinel lymph node mapping and biopsyW/ ROBOTICS    Type and screen    CBC and differential    Comprehensive metabolic panel    HEMOGLOBIN A1C W/ EAG ESTIMATION    EKG 12 lead    XR chest pa & lateral              CHIEF COMPLAINT: New diagnosis grade 3 endometrial cancer      Subjective:     Problem:  Cancer Staging  No matching staging information was found for the patient  Previous therapy:  Oncology History   Endometrial cancer ([facility] [address] )   5/15/2021 Initial Diagnosis    Endometrial cancer ([facility] [address] )     5/19/2021 -  Cancer Staged    Staging form: Corpus Uteri - Carcinoma, AJCC 8th Edition  - Clinical: FIGO Stage I (cT1, cN0, cM0) - Signed by Beatris Pina MD on 5/19/2021  Histologic grade (G): G3  Histologic grading system: 3 grade system             Patient ID: Edyta Michaels is a 66 y o  female    Patient is a very pleasant  65-year-old female seen in consultation from  Jac[facility]  Regarding evaluation and management of newly diagnosed endometrial cancer  The patient was or new in her usual state of health when she began to developed postmenopausal vaginal bleeding/dicharge  over the course of the past few wemonths  She had no other symptoms  She sought care and underwent an endometrial biopsy which reveals the following  Final Diagnosis  A  Endometrium (biopsy):  - High-grade carcinoma with necrosis  - See comment  Comment:  - The history of breast cancer is noted  - Ancillary studies are non-specific regarding site of origin  In the absence of extra-uterine disease, the findings may be compatible with  a primary endometrial carcinoma  Clinical / radiologic correlation is required  This is an appended report  These results have been appended to a previously preliminary verified report         last Pap smear in 2018 was negative with negative HPV high-risk subtypes      The patient does have a history of stage IA  breast cancer which was treated with lumpectomy radiation therapy and tamoxifen in   Today, the patient is doing well  She denies significant abdominal pain, pelvic pain, nausea, vomiting, constipation, diarrhea, fevers, chills, or vaginal bleeding  Review of Systems   Genitourinary: Positive for vaginal discharge  Current Outpatient Medications   Medication Sig Dispense Refill    azelastine (OPTIVAR) 0 05 % ophthalmic solution Administer 1 drop to both eyes daily as needed      Calcium Carb-Cholecalciferol (CALCIUM 1000 + D PO) Take 2,000 Units by mouth        cefTRIAXone (ROCEPHIN) 1 g injection       Cholecalciferol (Vitamin D) 50 MCG (2000 UT) CAPS Take 2,000 mg by mouth daily      Cholecalciferol (Vitamin D3) 25 MCG (1000 UT) CAPS Take 1 capsule by mouth 2 (two) times a day      LUTEIN PO Take 1 tablet by mouth daily      methenamine hippurate (HIPREX) 1 g tablet Take 1 g by mouth 2 (two) times a day      Turmeric (QC TUMERIC COMPLEX PO) Take 1 capsule by mouth daily       No current facility-administered medications for this visit          Allergies   Allergen Reactions    Bactrim [Sulfamethoxazole-Trimethoprim] Hives    Iodine - Food Allergy Swelling    Shellfish Allergy - Food Allergy Vomiting    Cephalexin Rash    Levaquin [Levofloxacin] Rash    Milk-Related Compounds - Food Allergy GI Intolerance    Whey Protein [Protein] Other (See Comments) and GI Intolerance     Flu symptoms         Past Medical History:   Diagnosis Date    Breast cancer [facility])        Past Surgical History:   Procedure Laterality Date    FEMUR SURGERY  2017    TUBAL LIGATION         OB History        1    Para   1    Term                AB        Living   1       SAB        TAB        Ectopic        Multiple        Live Births   1                 Family History   Problem Relation Age of Onset    Ovarian cancer Neg Hx     Cervical cancer Neg Hx     Uterine cancer Neg Hx        The following portions of the patient's history were reviewed and updated as appropriate: allergies, current medications, past medical history, past social history, past surgical history and problem list       Objective:    Blood pressure 130/82, pulse 104, temperature 98 2 °F (36 8 °C), resp  rate 18, height 5' 6" (1 676 m), weight 55 1 kg (121 lb 8 oz)  Body mass index is 19 61 kg/m²  Physical Exam  Constitutional:       Appearance: She is well-developed  HENT:      Head: Normocephalic and atraumatic  Eyes:      Pupils: Pupils are equal, round, and reactive to light  Neck:      Musculoskeletal: Normal range of motion and neck supple  Cardiovascular:      Rate and Rhythm: Normal rate and regular rhythm  Heart sounds: Normal heart sounds  Pulmonary:      Effort: Pulmonary effort is normal  No respiratory distress  Breath sounds: Normal breath sounds  Abdominal:      General: Bowel sounds are normal  There is no distension  Palpations: Abdomen is soft  Abdomen is not rigid  Tenderness: There is no abdominal tenderness  There is no guarding or rebound  Genitourinary:     Comments: -Normal external female genitalia, normal Bartholin's and [place]'s glands                  -Normal midline urethral meatus  No lesions notes                  -Bladder without fullness mass or tenderness                  -Vagina without lesion or discharge No significant cystocele or rectocele noted                  -Cervix normal appearing without visible lesions                  -Uterus with normal contour, mobility  No tenderness,                  -Adnexae without  mass or tenderness                  - Anus without fissure of lesion    Musculoskeletal: Normal range of motion  Lymphadenopathy:      Cervical: No cervical adenopathy  Upper Body:      Right upper body: No supraclavicular adenopathy  Left upper body: No supraclavicular adenopathy  Skin:     General: Skin is warm and dry  Neurological:      Mental Status: She is alert and oriented to person, place, and time     Psychiatric:         Behavior: Behavior normal

## 2021-05-19 NOTE — LETTER
May 19, 2021     Liseth Thomas, [address] [place] [address]    Patient: Kerline Markham   YOB: 1950   Date of Visit: 5/19/2021       Dear Dr Pooja Cotto: Thank you for referring Kerline Markham to me for evaluation  Below are my notes for this consultation  If you have questions, please do not hesitate to call me  I look forward to following your patient along with you  Sincerely,        Noel Velásquez MD        CC: MD Amarilis Grier MD  5/19/2021  4:09 PM  Sign when Signing Visit  Assessment/Plan:    Problem List Items Addressed This Visit        Genitourinary    Endometrial cancer [facility])     The patient has a new diagnosis of endometrial adenocarcinoma by her most recent biopsy  This appears to be early stage by clinical exam   We have discussed treatment options for this disease including chemotherapy, radiation therapy, and hormonal management  I have stated that medical evidence indicates that at this point to surgical management is her best option  I have discussed also the risks and benefits of lymph node biopsy at the time of surgery  We have discussed open versus laparoscopic versus robotic approach and have recommended the following:    Robotically assisted total laparoscopic hysterectomy bilateral salpingo-oophorectomy with sentinel lymph node biopsy  Have discussed risks and benefits of the procedure including bleeding requiring transfusion infection, infection, damage to local structures including bowel bladder ureter and other local organs  We discussed the risk of deep venous thrombosis  All of these complications are in the 2-4% range of likelihood  An open procedure may be required  The patient understands the risks and benefits of the procedure and has signed an informed consent  I personally signed the consent form with her    She does understand that further treatment including chemotherapy radiation therapy or hormones may be required based on the final postoperative pathologic diagnosis and staging  Standard preoperative testing including type and screen is CBC CPM P chest x-ray and EKG will be ordered  A CT scan of the chest abdomen and pelvis will be ordered due to the high-grade nature of this lesion      Overall consultation took 60 min with greater than 50% in dedicated toward discussion time  The patient is considering sentinel lymph node mapping and biopsy however is unsure and will decide by the day of surgery  Relevant Orders    CT chest abdomen pelvis wo contrast    Case request operating room: HYSTERECTOMY LAPAROSCOPIC TOTAL ([address])   Bilateral salpingo-oophorectomy possible sentinel lymph node mapping and biopsyW/ ROBOTICS    Type and screen    CBC and differential    Comprehensive metabolic panel    HEMOGLOBIN A1C W/ EAG ESTIMATION    EKG 12 lead    XR chest pa & lateral              CHIEF COMPLAINT: New diagnosis grade 3 endometrial cancer      Subjective:     Problem:  Cancer Staging  No matching staging information was found for the patient  Previous therapy:  Oncology History   Endometrial cancer ([facility] [address] )   5/15/2021 Initial Diagnosis    Endometrial cancer ([facility] [address] )     5/19/2021 -  Cancer Staged    Staging form: Corpus Uteri - Carcinoma, AJCC 8th Edition  - Clinical: FIGO Stage I (cT1, cN0, cM0) - Signed by Kenzie Díaz MD on 5/19/2021  Histologic grade (G): G3  Histologic grading system: 3 grade system             Patient ID: Akash De Leon is a 66 y o  female    Patient is a very pleasant 44-year-old female seen in consultation from  [facility] [facility] [employer]  Regarding evaluation and management of newly diagnosed endometrial cancer  The patient was or new in her usual state of health when she began to developed postmenopausal vaginal bleeding/dicharge  over the course of the past few wemonths  She had no other symptoms    She sought care and underwent an endometrial biopsy which reveals the following  Final Diagnosis  A  Endometrium (biopsy):  - High-grade carcinoma with necrosis  - See comment  Comment:  - The history of breast cancer is noted  - Ancillary studies are non-specific regarding site of origin  In the absence of extra-uterine disease, the findings may be compatible with  a primary endometrial carcinoma  Clinical / radiologic correlation is required  This is an appended report  These results have been appended to a previously preliminary verified report  last Pap smear in 2018 was negative with negative HPV high-risk subtypes      The patient does have a history of stage IA  breast cancer which was treated with lumpectomy radiation therapy and tamoxifen in 2007  Today, the patient is doing well  She denies significant abdominal pain, pelvic pain, nausea, vomiting, constipation, diarrhea, fevers, chills, or vaginal bleeding  Review of Systems   Genitourinary: Positive for vaginal discharge  Current Outpatient Medications   Medication Sig Dispense Refill    azelastine (OPTIVAR) 0 05 % ophthalmic solution Administer 1 drop to both eyes daily as needed      Calcium Carb-Cholecalciferol (CALCIUM 1000 + D PO) Take 2,000 Units by mouth        cefTRIAXone (ROCEPHIN) 1 g injection       Cholecalciferol (Vitamin D) 50 MCG (2000 UT) CAPS Take 2,000 mg by mouth daily      Cholecalciferol (Vitamin D3) 25 MCG (1000 UT) CAPS Take 1 capsule by mouth 2 (two) times a day      LUTEIN PO Take 1 tablet by mouth daily      methenamine hippurate (HIPREX) 1 g tablet Take 1 g by mouth 2 (two) times a day      Turmeric (QC TUMERIC COMPLEX PO) Take 1 capsule by mouth daily       No current facility-administered medications for this visit          Allergies   Allergen Reactions    Bactrim [Sulfamethoxazole-Trimethoprim] Hives    Iodine - Food Allergy Swelling    Shellfish Allergy - Food Allergy Vomiting    Cephalexin Rash    Levaquin [Levofloxacin] Rash    Milk-Related Compounds - Food Allergy GI Intolerance    Whey Protein [Protein] Other (See Comments) and GI Intolerance     Flu symptoms         Past Medical History:   Diagnosis Date    Breast cancer [facility])        Past Surgical History:   Procedure Laterality Date    FEMUR SURGERY  2017    TUBAL LIGATION         OB History        1    Para   1    Term                AB        Living   1       SAB        TAB        Ectopic        Multiple        Live Births   1                 Family History   Problem Relation Age of Onset    Ovarian cancer Neg Hx     Cervical cancer Neg Hx     Uterine cancer Neg Hx        The following portions of the patient's history were reviewed and updated as appropriate: allergies, current medications, past medical history, past social history, past surgical history and problem list       Objective:    Blood pressure 130/82, pulse 104, temperature 98 2 °F (36 8 °C), resp  rate 18, height 5' 6" (1 676 m), weight 55 1 kg (121 lb 8 oz)  Body mass index is 19 61 kg/m²  Physical Exam  Constitutional:       Appearance: She is well-developed  HENT:      Head: Normocephalic and atraumatic  Eyes:      Pupils: Pupils are equal, round, and reactive to light  Neck:      Musculoskeletal: Normal range of motion and neck supple  Cardiovascular:      Rate and Rhythm: Normal rate and regular rhythm  Heart sounds: Normal heart sounds  Pulmonary:      Effort: Pulmonary effort is normal  No respiratory distress  Breath sounds: Normal breath sounds  Abdominal:      General: Bowel sounds are normal  There is no distension  Palpations: Abdomen is soft  Abdomen is not rigid  Tenderness: There is no abdominal tenderness  There is no guarding or rebound  Genitourinary:     Comments: -Normal external female genitalia, normal Bartholin's and Christie's glands                  -Normal midline urethral meatus   No lesions notes                  -Bladder without fullness mass or tenderness                  -Vagina without lesion or discharge No significant cystocele or rectocele noted                  -Cervix normal appearing without visible lesions                  -Uterus with normal contour, mobility  No tenderness,                  -Adnexae without  mass or tenderness                  - Anus without fissure of lesion    Musculoskeletal: Normal range of motion  Lymphadenopathy:      Cervical: No cervical adenopathy  Upper Body:      Right upper body: No supraclavicular adenopathy  Left upper body: No supraclavicular adenopathy  Skin:     General: Skin is warm and dry  Neurological:      Mental Status: She is alert and oriented to person, place, and time     Psychiatric:         Behavior: Behavior normal

## 2021-05-19 NOTE — ASSESSMENT & PLAN NOTE
The patient has a new diagnosis of endometrial adenocarcinoma by her most recent biopsy  This appears to be early stage by clinical exam   We have discussed treatment options for this disease including chemotherapy, radiation therapy, and hormonal management  I have stated that medical evidence indicates that at this point to surgical management is her best option  I have discussed also the risks and benefits of lymph node biopsy at the time of surgery  We have discussed open versus laparoscopic versus robotic approach and have recommended the following:    Robotically assisted total laparoscopic hysterectomy bilateral salpingo-oophorectomy with sentinel lymph node biopsy  Have discussed risks and benefits of the procedure including bleeding requiring transfusion infection, infection, damage to local structures including bowel bladder ureter and other local organs  We discussed the risk of deep venous thrombosis  All of these complications are in the 2-4% range of likelihood  An open procedure may be required  The patient understands the risks and benefits of the procedure and has signed an informed consent  I personally signed the consent form with her  She does understand that further treatment including chemotherapy radiation therapy or hormones may be required based on the final postoperative pathologic diagnosis and staging  Standard preoperative testing including type and screen is CBC CPM P chest x-ray and EKG will be ordered  A CT scan of the chest abdomen and pelvis will be ordered due to the high-grade nature of this lesion      Overall consultation took 60 min with greater than 50% in dedicated toward discussion time  The patient is considering sentinel lymph node mapping and biopsy however is unsure and will decide by the day of surgery

## 2021-05-21 ENCOUNTER — LAB REQUISITION (OUTPATIENT)
Dept: LAB | Facility: HOSPITAL | Age: 71
End: 2021-05-21
Payer: MEDICARE

## 2021-05-21 ENCOUNTER — HOSPITAL ENCOUNTER (OUTPATIENT)
Dept: RADIOLOGY | Facility: HOSPITAL | Age: 71
Discharge: HOME | End: 2021-05-21
Attending: OBSTETRICS & GYNECOLOGY
Payer: MEDICARE

## 2021-05-21 ENCOUNTER — OFFICE VISIT (OUTPATIENT)
Dept: LAB | Facility: HOSPITAL | Age: 71
End: 2021-05-21
Attending: OBSTETRICS & GYNECOLOGY
Payer: MEDICARE

## 2021-05-21 DIAGNOSIS — C54.1 ENDOMETRIAL CANCER (HCC): ICD-10-CM

## 2021-05-21 DIAGNOSIS — C54.1 ENDOMETRIAL CANCER (HCC): Primary | ICD-10-CM

## 2021-05-21 DIAGNOSIS — C54.1 MALIGNANT NEOPLASM OF ENDOMETRIUM (HCC): ICD-10-CM

## 2021-05-21 LAB
ABO GROUP BLD: NORMAL
ALBUMIN SERPL BCP-MCNC: 3.4 G/DL (ref 3.5–5)
ALP SERPL-CCNC: 118 U/L (ref 46–116)
ALT SERPL W P-5'-P-CCNC: 50 U/L (ref 12–78)
ANION GAP SERPL CALCULATED.3IONS-SCNC: 6 MMOL/L (ref 4–13)
AST SERPL W P-5'-P-CCNC: 35 U/L (ref 5–45)
ATRIAL RATE: 75 BPM
BASOPHILS # BLD AUTO: 0.08 THOUSANDS/ΜL (ref 0–0.1)
BASOPHILS NFR BLD AUTO: 1 % (ref 0–1)
BILIRUB SERPL-MCNC: 0.4 MG/DL (ref 0.2–1)
BLD GP AB SCN SERPL QL: NEGATIVE
BUN SERPL-MCNC: 12 MG/DL (ref 5–25)
CALCIUM ALBUM COR SERPL-MCNC: 9.4 MG/DL (ref 8.3–10.1)
CALCIUM SERPL-MCNC: 8.9 MG/DL (ref 8.3–10.1)
CHLORIDE SERPL-SCNC: 102 MMOL/L (ref 100–108)
CO2 SERPL-SCNC: 31 MMOL/L (ref 21–32)
CREAT SERPL-MCNC: 0.67 MG/DL (ref 0.6–1.3)
EOSINOPHIL # BLD AUTO: 0.24 THOUSAND/ΜL (ref 0–0.61)
EOSINOPHIL NFR BLD AUTO: 4 % (ref 0–6)
ERYTHROCYTE [DISTWIDTH] IN BLOOD BY AUTOMATED COUNT: 13.4 % (ref 11.6–15.1)
EST. AVERAGE GLUCOSE BLD GHB EST-MCNC: 123 MG/DL
GFR SERPL CREATININE-BSD FRML MDRD: 88 ML/MIN/1.73SQ M
GLUCOSE P FAST SERPL-MCNC: 98 MG/DL (ref 65–99)
HBA1C MFR BLD: 5.9 %
HCT VFR BLD AUTO: 43.6 % (ref 34.8–46.1)
HGB BLD-MCNC: 13.9 G/DL (ref 11.5–15.4)
IMM GRANULOCYTES # BLD AUTO: 0.01 THOUSAND/UL (ref 0–0.2)
IMM GRANULOCYTES NFR BLD AUTO: 0 % (ref 0–2)
LYMPHOCYTES # BLD AUTO: 2.21 THOUSANDS/ΜL (ref 0.6–4.47)
LYMPHOCYTES NFR BLD AUTO: 38 % (ref 14–44)
MCH RBC QN AUTO: 27.9 PG (ref 26.8–34.3)
MCHC RBC AUTO-ENTMCNC: 31.9 G/DL (ref 31.4–37.4)
MCV RBC AUTO: 87 FL (ref 82–98)
MONOCYTES # BLD AUTO: 0.63 THOUSAND/ΜL (ref 0.17–1.22)
MONOCYTES NFR BLD AUTO: 11 % (ref 4–12)
NEUTROPHILS # BLD AUTO: 2.59 THOUSANDS/ΜL (ref 1.85–7.62)
NEUTS SEG NFR BLD AUTO: 46 % (ref 43–75)
NRBC BLD AUTO-RTO: 0 /100 WBCS
P AXIS: 35 DEGREES
PLATELET # BLD AUTO: 243 THOUSANDS/UL (ref 149–390)
PMV BLD AUTO: 9.8 FL (ref 8.9–12.7)
POTASSIUM SERPL-SCNC: 3.7 MMOL/L (ref 3.5–5.3)
PR INTERVAL: 170 MS
PROT SERPL-MCNC: 7.6 G/DL (ref 6.4–8.2)
QRS AXIS: 35 DEGREES
QRSD INTERVAL: 86 MS
QT INTERVAL: 400 MS
QTC INTERVAL: 446 MS
RBC # BLD AUTO: 4.99 MILLION/UL (ref 3.81–5.12)
RH BLD: POSITIVE
SODIUM SERPL-SCNC: 139 MMOL/L (ref 136–145)
SPECIMEN EXPIRATION DATE: NORMAL
T WAVE AXIS: 47 DEGREES
VENTRICULAR RATE: 75 BPM
WBC # BLD AUTO: 5.76 THOUSAND/UL (ref 4.31–10.16)

## 2021-05-21 PROCEDURE — 86900 BLOOD TYPING SEROLOGIC ABO: CPT | Performed by: OBSTETRICS & GYNECOLOGY

## 2021-05-21 PROCEDURE — 36415 COLL VENOUS BLD VENIPUNCTURE: CPT

## 2021-05-21 PROCEDURE — 85025 COMPLETE CBC W/AUTO DIFF WBC: CPT

## 2021-05-21 PROCEDURE — 83036 HEMOGLOBIN GLYCOSYLATED A1C: CPT

## 2021-05-21 PROCEDURE — 71046 X-RAY EXAM CHEST 2 VIEWS: CPT

## 2021-05-21 PROCEDURE — 86901 BLOOD TYPING SEROLOGIC RH(D): CPT | Performed by: OBSTETRICS & GYNECOLOGY

## 2021-05-21 PROCEDURE — 86850 RBC ANTIBODY SCREEN: CPT | Performed by: OBSTETRICS & GYNECOLOGY

## 2021-05-21 PROCEDURE — 93005 ELECTROCARDIOGRAM TRACING: CPT

## 2021-05-21 PROCEDURE — 80053 COMPREHEN METABOLIC PANEL: CPT

## 2021-05-21 PROCEDURE — 93010 ELECTROCARDIOGRAM REPORT: CPT | Performed by: INTERNAL MEDICINE

## 2021-05-27 ENCOUNTER — HOSPITAL ENCOUNTER (OUTPATIENT)
Dept: CT IMAGING | Facility: CLINIC | Age: 71
Discharge: HOME | End: 2021-05-27
Payer: MEDICARE

## 2021-05-27 DIAGNOSIS — C54.1 ENDOMETRIAL CANCER (HCC): ICD-10-CM

## 2021-05-27 PROCEDURE — 71250 CT THORAX DX C-: CPT

## 2021-05-27 PROCEDURE — 74176 CT ABD & PELVIS W/O CONTRAST: CPT

## 2021-05-27 PROCEDURE — G1004 CDSM NDSC: HCPCS

## 2021-05-27 RX ORDER — MULTIVIT WITH MINERALS/LUTEIN
1000 TABLET ORAL DAILY
COMMUNITY
End: 2021-10-04 | Stop reason: HOSPADM

## 2021-05-27 RX ORDER — ACETAMINOPHEN 500 MG
500 TABLET ORAL EVERY 6 HOURS PRN
COMMUNITY
End: 2021-10-04 | Stop reason: HOSPADM

## 2021-05-27 NOTE — PRE-PROCEDURE INSTRUCTIONS
Pre-Surgery Instructions:   Medication Instructions    acetaminophen (TYLENOL) 500 mg tablet takes PRN     Ascorbic Acid (vitamin C) 1000 MG tablet LD 5/27 will resume after DOS     azelastine (OPTIVAR) 0 05 % ophthalmic solution takes PRN    Calcium Carb-Cholecalciferol (CALCIUM 1000 + D PO) LD 5/27 will resume after DOS     Cholecalciferol (Vitamin D) 50 MCG (2000 UT) CAPS LD 5/27 will resume after DOS     Cholecalciferol (Vitamin D3) 25 MCG (1000 UT) CAPS LD 5/27 will resume after DOS     diphenhydrAMINE HCl (BENADRYL ALLERGY PO) takes PRN    LUTEIN PO LD 5/27 will resume after DOS     methenamine hippurate (HIPREX) 1 g tablet LD 5/27 will resume after DOS     metroNIDAZOLE (METROCREAM) 0 75 % cream LD 5/27 will resume after DOS     Probiotic Product (CULTURELLE PROBIOTICS PO) LD 5/27 will resume after DOS     VITAMIN K PO LD 5/27 will resume after DOS     Vitamins A & D (VITAMIN A & D PO) LD 5/27 will resume after DOS     Acetaminophen Med Class  Continue to take this medication on your normal schedule  If this is an oral medication and you take it in the morning, then you may take this medicine with a sip of water  Herbal Med Class  Stop taking this herbal medications at least one week prior to surgery/procedure  Vitamin Med Class  You may continue to take any vitamin that your surgeon has prescribed to you up to the day before surgery  If your surgeon has not specifically prescribed this vitamin or instructed you to continue then stop taking 7 days prior to surgery  Reviewed with Pt med insts, showering insts, ENSURE PRE_OP drink insts  Advised of NPO at [place] for DOS 6/3 including candy mints etc   Advised of [facility] call 6/2 with final DOS details Pt verbalized understanding to all of the above  Contact # given if questions arose

## 2021-06-02 ENCOUNTER — ANESTHESIA EVENT (OUTPATIENT)
Dept: PERIOP | Facility: HOSPITAL | Age: 71
End: 2021-06-02
Payer: MEDICARE

## 2021-06-02 ENCOUNTER — TELEPHONE (OUTPATIENT)
Dept: SURGICAL ONCOLOGY | Facility: CLINIC | Age: 71
End: 2021-06-02

## 2021-06-02 NOTE — TELEPHONE ENCOUNTER
Katlyn called she states that she read her results online and had some questions in regards to the results she read on my-chart  I transferred Katlyn to the triage nurse line but they suggested she call back this office to speak with Dr Jarred Cronin  Please call Katlyn at [phone]

## 2021-06-03 ENCOUNTER — HOSPITAL ENCOUNTER (OUTPATIENT)
Facility: HOSPITAL | Age: 71
Setting detail: OUTPATIENT SURGERY
Discharge: HOME | End: 2021-06-03
Attending: OBSTETRICS & GYNECOLOGY | Admitting: OBSTETRICS & GYNECOLOGY
Payer: MEDICARE

## 2021-06-03 ENCOUNTER — ANESTHESIA (OUTPATIENT)
Dept: PERIOP | Facility: HOSPITAL | Age: 71
End: 2021-06-03
Payer: MEDICARE

## 2021-06-03 VITALS
HEIGHT: 66 IN | BODY MASS INDEX: 19.44 KG/M2 | RESPIRATION RATE: 20 BRPM | DIASTOLIC BLOOD PRESSURE: 71 MMHG | WEIGHT: 121 LBS | OXYGEN SATURATION: 97 % | HEART RATE: 78 BPM | TEMPERATURE: 96.4 F | SYSTOLIC BLOOD PRESSURE: 148 MMHG

## 2021-06-03 DIAGNOSIS — C54.1 ENDOMETRIAL CANCER (HCC): ICD-10-CM

## 2021-06-03 DIAGNOSIS — G89.18 POSTOPERATIVE PAIN: Primary | ICD-10-CM

## 2021-06-03 PROBLEM — Z90.710 HISTORY OF ROBOT-ASSISTED LAPAROSCOPIC HYSTERECTOMY: Status: ACTIVE | Noted: 2021-06-03

## 2021-06-03 LAB
ABO GROUP BLD: NORMAL
RH BLD: POSITIVE

## 2021-06-03 PROCEDURE — 38570 LAPAROSCOPY LYMPH NODE BIOP: CPT | Performed by: OBSTETRICS & GYNECOLOGY

## 2021-06-03 PROCEDURE — 88342 IMHCHEM/IMCYTCHM 1ST ANTB: CPT | Performed by: PATHOLOGY

## 2021-06-03 PROCEDURE — 88309 TISSUE EXAM BY PATHOLOGIST: CPT | Performed by: PATHOLOGY

## 2021-06-03 PROCEDURE — 58571 TLH W/T/O 250 G OR LESS: CPT | Performed by: OBSTETRICS & GYNECOLOGY

## 2021-06-03 PROCEDURE — 38900 IO MAP OF SENT LYMPH NODE: CPT | Performed by: OBSTETRICS & GYNECOLOGY

## 2021-06-03 PROCEDURE — 88112 CYTOPATH CELL ENHANCE TECH: CPT | Performed by: PATHOLOGY

## 2021-06-03 PROCEDURE — 88361 TUMOR IMMUNOHISTOCHEM/COMPUT: CPT | Performed by: PATHOLOGY

## 2021-06-03 PROCEDURE — NC001 PR NO CHARGE: Performed by: OBSTETRICS & GYNECOLOGY

## 2021-06-03 PROCEDURE — 88341 IMHCHEM/IMCYTCHM EA ADD ANTB: CPT | Performed by: PATHOLOGY

## 2021-06-03 PROCEDURE — 88307 TISSUE EXAM BY PATHOLOGIST: CPT | Performed by: PATHOLOGY

## 2021-06-03 RX ORDER — ONDANSETRON 2 MG/ML
4 INJECTION INTRAMUSCULAR; INTRAVENOUS ONCE AS NEEDED
Status: COMPLETED | OUTPATIENT
Start: 2021-06-03 | End: 2021-06-03

## 2021-06-03 RX ORDER — INDOCYANINE GREEN AND WATER 25 MG
KIT INJECTION AS NEEDED
Status: DISCONTINUED | OUTPATIENT
Start: 2021-06-03 | End: 2021-06-03 | Stop reason: HOSPADM

## 2021-06-03 RX ORDER — OXYCODONE HYDROCHLORIDE 5 MG/1
TABLET ORAL
Qty: 10 TABLET | Refills: 0 | Status: SHIPPED | OUTPATIENT
Start: 2021-06-03 | End: 2021-10-04 | Stop reason: HOSPADM

## 2021-06-03 RX ORDER — ACETAMINOPHEN 325 MG/1
975 TABLET ORAL ONCE
Status: COMPLETED | OUTPATIENT
Start: 2021-06-03 | End: 2021-06-03

## 2021-06-03 RX ORDER — IBUPROFEN 600 MG/1
600 TABLET ORAL EVERY 6 HOURS PRN
Status: CANCELLED | OUTPATIENT
Start: 2021-06-03

## 2021-06-03 RX ORDER — LIDOCAINE HYDROCHLORIDE 10 MG/ML
INJECTION, SOLUTION EPIDURAL; INFILTRATION; INTRACAUDAL; PERINEURAL AS NEEDED
Status: DISCONTINUED | OUTPATIENT
Start: 2021-06-03 | End: 2021-06-03

## 2021-06-03 RX ORDER — ACETAMINOPHEN 325 MG/1
650 TABLET ORAL EVERY 6 HOURS PRN
Status: CANCELLED | OUTPATIENT
Start: 2021-06-03

## 2021-06-03 RX ORDER — ONDANSETRON 2 MG/ML
INJECTION INTRAMUSCULAR; INTRAVENOUS AS NEEDED
Status: DISCONTINUED | OUTPATIENT
Start: 2021-06-03 | End: 2021-06-03

## 2021-06-03 RX ORDER — SODIUM CHLORIDE, SODIUM LACTATE, POTASSIUM CHLORIDE, CALCIUM CHLORIDE 600; 310; 30; 20 MG/100ML; MG/100ML; MG/100ML; MG/100ML
125 INJECTION, SOLUTION INTRAVENOUS CONTINUOUS
Status: DISCONTINUED | OUTPATIENT
Start: 2021-06-03 | End: 2021-06-03 | Stop reason: HOSPADM

## 2021-06-03 RX ORDER — HYDROMORPHONE HCL IN WATER/PF 6 MG/30 ML
0.1 PATIENT CONTROLLED ANALGESIA SYRINGE INTRAVENOUS
Status: DISCONTINUED | OUTPATIENT
Start: 2021-06-03 | End: 2021-06-03 | Stop reason: HOSPADM

## 2021-06-03 RX ORDER — KETOROLAC TROMETHAMINE 30 MG/ML
15 INJECTION, SOLUTION INTRAMUSCULAR; INTRAVENOUS ONCE AS NEEDED
Status: DISCONTINUED | OUTPATIENT
Start: 2021-06-03 | End: 2021-06-03 | Stop reason: HOSPADM

## 2021-06-03 RX ORDER — OXYCODONE HYDROCHLORIDE 5 MG/1
10 TABLET ORAL EVERY 4 HOURS PRN
Status: CANCELLED | OUTPATIENT
Start: 2021-06-03

## 2021-06-03 RX ORDER — GABAPENTIN 100 MG/1
100 CAPSULE ORAL ONCE
Status: COMPLETED | OUTPATIENT
Start: 2021-06-03 | End: 2021-06-03

## 2021-06-03 RX ORDER — FENTANYL CITRATE 50 UG/ML
INJECTION, SOLUTION INTRAMUSCULAR; INTRAVENOUS AS NEEDED
Status: DISCONTINUED | OUTPATIENT
Start: 2021-06-03 | End: 2021-06-03

## 2021-06-03 RX ORDER — MAGNESIUM HYDROXIDE 1200 MG/15ML
LIQUID ORAL AS NEEDED
Status: DISCONTINUED | OUTPATIENT
Start: 2021-06-03 | End: 2021-06-03 | Stop reason: HOSPADM

## 2021-06-03 RX ORDER — SODIUM CHLORIDE 9 MG/ML
125 INJECTION, SOLUTION INTRAVENOUS CONTINUOUS
Status: DISCONTINUED | OUTPATIENT
Start: 2021-06-03 | End: 2021-06-03 | Stop reason: HOSPADM

## 2021-06-03 RX ORDER — DIPHENHYDRAMINE HYDROCHLORIDE 50 MG/ML
12.5 INJECTION INTRAMUSCULAR; INTRAVENOUS ONCE AS NEEDED
Status: DISCONTINUED | OUTPATIENT
Start: 2021-06-03 | End: 2021-06-03 | Stop reason: HOSPADM

## 2021-06-03 RX ORDER — PROPOFOL 10 MG/ML
INJECTION, EMULSION INTRAVENOUS AS NEEDED
Status: DISCONTINUED | OUTPATIENT
Start: 2021-06-03 | End: 2021-06-03

## 2021-06-03 RX ORDER — DEXAMETHASONE SODIUM PHOSPHATE 10 MG/ML
INJECTION, SOLUTION INTRAMUSCULAR; INTRAVENOUS AS NEEDED
Status: DISCONTINUED | OUTPATIENT
Start: 2021-06-03 | End: 2021-06-03

## 2021-06-03 RX ORDER — SUCCINYLCHOLINE/SOD CL,ISO/PF 100 MG/5ML
SYRINGE (ML) INTRAVENOUS AS NEEDED
Status: DISCONTINUED | OUTPATIENT
Start: 2021-06-03 | End: 2021-06-03

## 2021-06-03 RX ORDER — KETAMINE HYDROCHLORIDE 50 MG/ML
INJECTION, SOLUTION, CONCENTRATE INTRAMUSCULAR; INTRAVENOUS AS NEEDED
Status: DISCONTINUED | OUTPATIENT
Start: 2021-06-03 | End: 2021-06-03

## 2021-06-03 RX ORDER — PROPOFOL 10 MG/ML
INJECTION, EMULSION INTRAVENOUS CONTINUOUS PRN
Status: DISCONTINUED | OUTPATIENT
Start: 2021-06-03 | End: 2021-06-03

## 2021-06-03 RX ORDER — CLINDAMYCIN PHOSPHATE 900 MG/50ML
900 INJECTION INTRAVENOUS ONCE
Status: DISCONTINUED | OUTPATIENT
Start: 2021-06-03 | End: 2021-06-03 | Stop reason: HOSPADM

## 2021-06-03 RX ORDER — ONDANSETRON 2 MG/ML
4 INJECTION INTRAMUSCULAR; INTRAVENOUS EVERY 6 HOURS PRN
Status: DISCONTINUED | OUTPATIENT
Start: 2021-06-03 | End: 2021-06-03 | Stop reason: HOSPADM

## 2021-06-03 RX ORDER — KETOROLAC TROMETHAMINE 30 MG/ML
INJECTION, SOLUTION INTRAMUSCULAR; INTRAVENOUS AS NEEDED
Status: DISCONTINUED | OUTPATIENT
Start: 2021-06-03 | End: 2021-06-03

## 2021-06-03 RX ORDER — BUPIVACAINE HYDROCHLORIDE 2.5 MG/ML
INJECTION, SOLUTION EPIDURAL; INFILTRATION; INTRACAUDAL AS NEEDED
Status: DISCONTINUED | OUTPATIENT
Start: 2021-06-03 | End: 2021-06-03 | Stop reason: HOSPADM

## 2021-06-03 RX ORDER — SODIUM CHLORIDE, SODIUM LACTATE, POTASSIUM CHLORIDE, CALCIUM CHLORIDE 600; 310; 30; 20 MG/100ML; MG/100ML; MG/100ML; MG/100ML
INJECTION, SOLUTION INTRAVENOUS CONTINUOUS PRN
Status: DISCONTINUED | OUTPATIENT
Start: 2021-06-03 | End: 2021-06-03

## 2021-06-03 RX ORDER — OXYCODONE HYDROCHLORIDE 5 MG/1
5 TABLET ORAL EVERY 4 HOURS PRN
Status: CANCELLED | OUTPATIENT
Start: 2021-06-03

## 2021-06-03 RX ADMIN — FENTANYL CITRATE 50 MCG: 50 INJECTION INTRAMUSCULAR; INTRAVENOUS at 07:46

## 2021-06-03 RX ADMIN — PROPOFOL 100 MG: 10 INJECTION, EMULSION INTRAVENOUS at 07:46

## 2021-06-03 RX ADMIN — SUGAMMADEX 200 MG: 100 INJECTION, SOLUTION INTRAVENOUS at 10:51

## 2021-06-03 RX ADMIN — ACETAMINOPHEN 975 MG: 325 TABLET, FILM COATED ORAL at 06:11

## 2021-06-03 RX ADMIN — Medication 50 MG: at 08:08

## 2021-06-03 RX ADMIN — KETOROLAC TROMETHAMINE 15 MG: 30 INJECTION, SOLUTION INTRAMUSCULAR at 10:45

## 2021-06-03 RX ADMIN — Medication 100 MG: at 07:47

## 2021-06-03 RX ADMIN — LIDOCAINE HYDROCHLORIDE 50 MG: 10 INJECTION, SOLUTION EPIDURAL; INFILTRATION; INTRACAUDAL; PERINEURAL at 07:46

## 2021-06-03 RX ADMIN — GABAPENTIN 100 MG: 100 CAPSULE ORAL at 06:11

## 2021-06-03 RX ADMIN — ONDANSETRON 4 MG: 2 INJECTION INTRAMUSCULAR; INTRAVENOUS at 10:45

## 2021-06-03 RX ADMIN — KETAMINE HYDROCHLORIDE 30 MG: 50 INJECTION, SOLUTION INTRAMUSCULAR; INTRAVENOUS at 08:13

## 2021-06-03 RX ADMIN — Medication 2000 MG: at 08:05

## 2021-06-03 RX ADMIN — ENOXAPARIN SODIUM 40 MG: 40 INJECTION SUBCUTANEOUS at 07:28

## 2021-06-03 RX ADMIN — KETAMINE HYDROCHLORIDE 20 MG: 50 INJECTION, SOLUTION INTRAMUSCULAR; INTRAVENOUS at 07:46

## 2021-06-03 RX ADMIN — HYDROMORPHONE HYDROCHLORIDE 0.1 MG: 0.2 INJECTION, SOLUTION INTRAMUSCULAR; INTRAVENOUS; SUBCUTANEOUS at 12:27

## 2021-06-03 RX ADMIN — PROPOFOL 60 MCG/KG/MIN: 10 INJECTION, EMULSION INTRAVENOUS at 08:17

## 2021-06-03 RX ADMIN — ONDANSETRON 4 MG: 2 INJECTION INTRAMUSCULAR; INTRAVENOUS at 12:26

## 2021-06-03 RX ADMIN — DEXAMETHASONE SODIUM PHOSPHATE 5 MG: 10 INJECTION, SOLUTION INTRAMUSCULAR; INTRAVENOUS at 08:26

## 2021-06-03 RX ADMIN — PROPOFOL 40 MG: 10 INJECTION, EMULSION INTRAVENOUS at 08:20

## 2021-06-03 RX ADMIN — SODIUM CHLORIDE, SODIUM LACTATE, POTASSIUM CHLORIDE, AND CALCIUM CHLORIDE: .6; .31; .03; .02 INJECTION, SOLUTION INTRAVENOUS at 07:38

## 2021-06-03 NOTE — INTERVAL H&P NOTE
H&P reviewed  After examining the patient I find no changes in the patients condition since the H&P had been written  Plan robotically assisted total laparoscopic hysterectomy bilateral salpingo-oophorectomy and sentinel lymph node mapping and biopsy for a high-grade endometrial cancer  CT scan with small 5mm or less pulmonary nodules of unknown stability and 1 cm periotic nodes  There is no obvious evidence of metastatic  disease  The remainder of the Preoperative testing is stable for surgery  Vital signs are stable for surgery today

## 2021-06-03 NOTE — OP NOTE
OPERATIVE REPORT  PATIENT NAME: Karie Su    :  1950  MRN: [id number]  Pt Location: [facility] OR ROOM 14    SURGERY DATE: 6/3/2021    Surgeon(s) and Role:     * Edmond Gamboa MD - Primary     * Jef Ha PA-C - Assisting     * Lubna Xie MD - Assisting    Preop Diagnosis:  Endometrial cancer ([address] ) [C54 1]    Post-Op Diagnosis Codes:     * Endometrial cancer ([address] ) [C54 1]    Procedure(s) (LRB):  ROBOT ASSISTED TOTAL LAPAROSCOPIC HYSTERECTOMY, BILATERAL SALPINGO-OOPHORECTOMY, POSSIBLE SENTINEL LYMPH NODE MAPPING AND BIOPSY (N/A)    Specimen(s):  ID Type Source Tests Collected by Time Destination   1 :  Washing Pelvic Washing NON-GYNECOLOGIC CYTOLOGY Edmond Gamboa MD 6/3/2021 0845    2 : Left Pelvic Lymph Node #1 Tissue Lymph Node, [place] TISSUE EXAM Edmond Gamboa MD 6/3/2021 0914    3 : Left Pelvic Lymph Node #2 Tissue Lymph Node, [place] TISSUE EXAM Edmond Gamboa MD 6/3/2021 0915    4 : Left Pelvic Lymph Node #3 Tissue Lymph Node, [place] TISSUE EXAM Edmond Gamboa MD 6/3/2021 1869    5 : Right Common iliac Lymph Node #1 Tissue Lymph Node, [place] TISSUE EXAM Edmond Gamboa MD 6/3/2021 8454    6 : Right pelvic sentinel Lymph Node #2 Tissue Lymph Node, [place] TISSUE EXAM Edmond Gamboa MD 6/3/2021 0933    7 : uterus, cervix, b/l tubes and ovaries Tissue Uterus w/Bilateral Ovaries and Fallopian Tubes TISSUE Mirna Onofre MD 6/3/2021 1022        Estimated Blood Loss:   Minimal    Drains:  Urethral Catheter Non-latex 16 Fr  (Active)   Number of days: 0       Anesthesia Type:   General    Operative Indications:  Endometrial cancer ([address] ) [C54 1]    Operative Findings:  Normal uterus cervix tubes and ovaries and sentinel nodes  No evidense of intraabdominal tumor    Complications:   None    Procedure and Technique:    The patient was identified as herself and and appropriate time-out procedure was performed      The patient was placed in dorsal lithotomy position and prepped and draped in the usual sterile fashion including a 3 times vaginal Chlorhexadine prep  Attention was turned to the vagina where a sterile by valve speculum was placed into the vagina  The cervix was grasped with a single tooth tenaculum  I personallly injected the cervix at the 3 o'clock and 9 o'clock sites with both the deep and superficial injections of ICG with a spinal needle  Nye catheter was placed without difficulty  An EEA sizer was then placed into the vagina without difficulty  Attention was then turned to the abdomen where planned incision sites were marked and infiltrated with 0 5% Marcaine  The periumbilical incision was created with a knife  The Veress needle was placed without difficulty  The abdomen was insufflated with sterile gas  The remainder of the incisions were created with a knife  The 8 mm trocar was placed into the rita umbilical incision and a camera was placed without difficulty  Under direct visualization the 3 other DaVinci 8 mm ports were placed without difficulty  A 11mm assistant port was placed in the right lower quadrant without difficulty  The patient was placed in steep Trendelenburg position  The robot was brought in and side docked without difficulty  Electrocautery sheers were placed in the number 1 arm, a Vessel Sealer was placed in the 3  Arm, and a Prograsp was placed in the 4  Arm  I broke scrub an approached the console  The right round ligament was taken down with the Vessel Sealer  The right pelvic sidewall was opened with cautery riley  The right perivesical and pararectal spaces were open  The ICG was tracked using the Firefly scope  The phosphorescent lymph vessels were identified and followed  All identified sentinel nodes by ICG capture were removed and sent as numbered, side specific sentinel lymph nodes  The infundibulopelvic ligament and ureter on the right were  in this retroperitoneal space    The path of the ureter was identified  The infundibulopelvic ligament was then taken down with the Vessel Sealer in multiple bites  The posterior broad ligament was taken down with electrocautery ghada  The same procedure was performed on the left-hand side and the left round ligament was taken down in the same fashion  The left paravesicle and perirectal spaces were open, ICG lymph vessels were tracked and the sentinel lymph nodes were removed  The bladder flap was begun with the electrocautery Ghada and taken down to the upper vagina  The uterine artery pedicle was skeletonized and taken down with the Vessel Sealer  The cardinal ligament was taken down with sequential bites with the Vessel Sealer  The utererosacral ligament was taken down with the Vessel Sealer  The same procedure was then performed on the patient's left-hand side again taking care to identify the ureter prior to taking down the infundibulopelvic ligament  The vaginal cuff was then taken down with Electrocautery Ghada  The specimen was retrieved through the vagina with a single tooth tenaculum  The vaginal cuff was closed with a running suture of 2 0 Stratafix  The abdomen was explored and no further bleeding sites were noted  The pneumoperitoneum was allowed to escape and no bleeding was noted  All instruments were removed  The trocars were removed  The incision sites were closed with 4 0 Monocryl without difficulty  The patient tolerated procedure without complications  The sponge and instrument counts were correct prior to closure  Hemostasis was assured prior to closure  The patient was brought to the recovery room in stable condition      Elva Hernandez MD  Director of Cancer Survivorship  Division of GYN Oncology  [facility][1]    I was present for the entire procedure    Patient Disposition:  PACU     SIGNATURE: Elva Hernandez MD  DATE: Sunshine 3, 2021  TIME: 10:54 AM

## 2021-06-03 NOTE — ANESTHESIA POSTPROCEDURE EVALUATION
Post-Op Assessment Note    CV Status:  Stable    Pain management: adequate     Mental Status:  Sleepy   Hydration Status:  Stable   PONV Controlled:  None   Airway Patency:  Patent      Post Op Vitals Reviewed: Yes      Staff: Anesthesiologist         No complications documented      /72 (06/03/21 1117)    Temp (!) 97 3 °F (36 3 °C) (06/03/21 1117)    Pulse 84 (06/03/21 1117)   Resp   14   SpO2   100

## 2021-06-03 NOTE — DISCHARGE INSTRUCTIONS
[facility]  Rocio Santoyo  [phone]    Hysterectomy Discharge Instructions    WHAT YOU NEED TO KNOW:   A hysterectomy is surgery to remove your uterus  Your ovaries, fallopian tubes, cervix, or part of your vagina may also need to be removed  The organs and tissue that will be removed depends on your medical condition  After a hysterectomy, you will not be able to become pregnant  If your ovaries are removed, you will go through menopause if you have not already  DISCHARGE INSTRUCTIONS:   Contact your doctor at the number above if:   · You have a fever over 101o  · You have nausea or are vomiting that does not improve after a light meal    · Your pain is getting worse, even after you take medicine  · You feel pain or burning when you urinate, or you have trouble urinating  · You have pus or a foul-smelling odor coming from your vagina  · Your wound is red, swollen, or draining pus  · You see new or an increased amount of bright red blood coming from your vagina or your incisions  · You have questions or concerns about your condition or care  Seek care immediately:   · Your arm or leg feels warm, tender, and painful  It may look swollen and red  · You have increasing abdominal or pelvic pain  · You have heavy vaginal bleeding that fills 1 or more sanitary pads in 1 hour  Call 911 for any of the following:   · You feel lightheaded, short of breath, and have chest pain  · You cough up blood  Medicines: You may need any of the following:  · Prescription medicine may be given  You may receive a prescription for pain medication or be advised to use over the counter (OTC) pain medication such as acetaminophen (Tylenol) or ibuprofen (Advil, Motrin)  Ask your healthcare provider how to take this medicine safely  · NSAIDs , such as ibuprofen, help decrease swelling, pain, and fever   NSAIDs can cause stomach bleeding or kidney problems in certain people  If you take blood thinner medicine, always ask your healthcare provider if NSAIDs are safe for you  Always read the medicine label and follow directions  · Stool softeners help treat or prevent constipation  · Take your medicine as directed  Contact your healthcare provider if you think your medicine is not helping or if you have side effects  Tell him or her if you are allergic to any medicine  Keep a list of the medicines, vitamins, and herbs you take  Include the amounts, and when and why you take them  Bring the list or the pill bottles to follow-up visits  Carry your medicine list with you in case of an emergency  Activity:   · Rest as needed  Get up and move around as directed to help prevent blood clots  Start with short walks and slowly increase the distance every day  Limit the number of times you climb stairs to 2 times each day for the first week  Plan most of your daily activities on one level of your home  · Do not lift objects heavier than 10 pounds for 6 weeks  Avoid strenuous activity for 2 weeks  · Do not strain during bowel movements  High-fiber foods and extra liquids can help you prevent constipation  Examples of high-fiber foods are fruit and bran  Prune juice and water are good liquids to drink  · Do not have sex, use tampons, or douche for up to 8 weeks  You may shower as soon as the day after surgery  Tub baths can be taken starting 2 weeks after surgery  Do not go into pools or hot tubs until cleared by your doctor  · Ask when it is safe for you to drive  It is generally safe to drive after 2 weeks and when no longer taking prescription pain medication  · Ask when you may return to work and to other regular activities  Wound care: Care for your abdominal incisions as directed  Carefully wash around the wound with soap and water   If you have Hibiclens or medicated soap that you were instructed to use before surgery, you may use that to wash with for up to 2 days after surgery  If not, any mild non-scented, non-abrasive soap is safe  It is okay to let the soap and water run over your incision  Do not scrub your incision  Dry the area and put on new, clean bandages as directed  Change your bandages when they get wet or dirty  If you have strips of medical tape, let them fall off on their own  It may take 7 to 14 days for them to fall off  Check your incision every day for redness, swelling, or pus  Deep breathing: Take deep breaths and cough 10 times each hour  This will decrease your risk for a lung infection  Take a deep breath and hold it for as long as you can  Let the air out and then cough strongly  Deep breaths help open your airway  You may be given an incentive spirometer to help you take deep breaths  Put the plastic piece in your mouth and take a slow, deep breath, then let the air out and cough  Repeat these steps 10 times every hour  Get support: This surgery may be life-changing for you and your family  You will no longer be able to get pregnant  Sudden changes in the levels of your hormones may occur and cause mood swings and depression  You may feel angry, sad, or frightened, or cry frequently and unexpectedly  These feelings are normal  Talk to your healthcare provider about where you can get support  You can also ask if hormone replacement medicine is right for you  Follow up with your healthcare provider or gynecologist as directed: You may need to return to have stitches removed, and for other tests  Write down your questions so you remember to ask them during your visits  © 2017 [employer] Information is for End User's use only and may not be sold, redistributed or otherwise used for commercial purposes  All illustrations and images included in CareNotes® are the copyrighted property of [employer]  or [employer]  The above information is an  only   It is not intended as medical advice for individual conditions or treatments  Talk to your doctor, nurse or pharmacist before following any medical regimen to see if it is safe and effective for you

## 2021-06-10 ENCOUNTER — TELEPHONE (OUTPATIENT)
Dept: HEMATOLOGY ONCOLOGY | Facility: CLINIC | Age: 71
End: 2021-06-10

## 2021-06-10 NOTE — TELEPHONE ENCOUNTER
She states that she is very black and blue down both legs especially the left one    She is feeling quite right  Please call [phone]

## 2021-06-16 ENCOUNTER — OFFICE VISIT (OUTPATIENT)
Dept: GYNECOLOGIC ONCOLOGY | Facility: CLINIC | Age: 71
End: 2021-06-16
Payer: MEDICARE

## 2021-06-16 VITALS
WEIGHT: 120 LBS | HEIGHT: 66 IN | TEMPERATURE: 97.5 F | DIASTOLIC BLOOD PRESSURE: 72 MMHG | SYSTOLIC BLOOD PRESSURE: 140 MMHG | BODY MASS INDEX: 19.29 KG/M2 | HEART RATE: 78 BPM | RESPIRATION RATE: 18 BRPM

## 2021-06-16 DIAGNOSIS — C54.1 ENDOMETRIAL CANCER (HCC): Primary | ICD-10-CM

## 2021-06-16 PROCEDURE — 99024 POSTOP FOLLOW-UP VISIT: CPT | Performed by: OBSTETRICS & GYNECOLOGY

## 2021-06-16 PROCEDURE — 1124F ACP DISCUSS-NO DSCNMKR DOCD: CPT | Performed by: OBSTETRICS & GYNECOLOGY

## 2021-06-16 NOTE — PROGRESS NOTES
Assessment/Plan:    Problem List Items Addressed This Visit        Genitourinary    Endometrial cancer [facility]) - Primary      Patient is very pleasant 69-year-old female with a recent diagnosis of stage IIIC 1 grade 3 endometrial adenocarcinoma with possible lung metastasis as evidence by small pulmonary nodules in the 2-5 mm range  The patient has a history of multiple pneumonias as a child  With regard to this we have discussed treatment options with chemo radiation therapy  The patient would prefer not to have chemotherapy if she did not absolutely have to at this point  We will discuss the patient's treatment planning at upcoming tumor board and will see the patient back in 2 weeks for further discussion and bimanual exam    I have given the patient information sheets regarding carboplatin and paclitaxel chemotherapy  CHIEF COMPLAINT:   Treatment planning endometrial cancer      Problem:  Cancer Staging  Endometrial cancer ([facility] [address] )  Staging form: Corpus Uteri - Carcinoma, AJCC 8th Edition  - Clinical: FIGO Stage I (cT1, cN0, cM0) - Signed by Anna Jamil MD on 5/19/2021        Previous therapy:  Oncology History   Endometrial cancer ([facility] [address] )   5/15/2021 Initial Diagnosis    Endometrial cancer ([facility] [address] )     5/19/2021 -  Cancer Staged    Staging form: Corpus Uteri - Carcinoma, AJCC 8th Edition  - Clinical: FIGO Stage I (cT1, cN0, cM0) - Signed by Anna Jamil MD on 5/19/2021  Histologic grade (G): G3  Histologic grading system: 3 grade system       5/2021 Surgery      Robotically assisted total laparoscopic hysterectomy bilateral salpingo-oophorectomy and sentinel lymph node mapping and biopsy for stage III C1 grade 3 endometrial cancer with 17 of 18 mm invasion, cervical stromal involvement and bilateral positive nodes in the pelvis  Patient has also had small 2-5 mm pulmonary nodules which are too small to characterize as   Metastatic disease    The patient has a history of multiple pneumonias as a child  6/16/2021 -  Cancer Staged    Staging form: Corpus Uteri - Carcinoma, AJCC 8th Edition  - Pathologic: FIGO Stage IIIC1 (pT2, pN1, cM0) - Signed by Alton Mcclain MD on 6/16/2021  Histologic grade (G): G3  Histologic grading system: 3 grade system  Residual tumor (R): R0 - None             Patient ID: Jasmin Zepeda is a 66 y o  female    Patient is very pleasant 71-year-old white female with a recent robotically assisted total laparoscopic hysterectomy bilateral salpingo-oophorectomy and sentinel lymph node mapping and biopsies for stage III C1 grade 3 endometrial cancer  Preoperative CT scan reveals multiple 2 to 5 mm nodules in the chest of unsure significance  Postoperatively the patient has done well  She has no significant complaints  Her pain is well controlled  She has no significant bleeding  Today, the patient is doing well  She denies significant abdominal pain, pelvic pain, nausea, vomiting, constipation, diarrhea, fevers, chills, or vaginal bleeding  The following portions of the patient's history were reviewed and updated as appropriate: allergies, current medications, past family history, past medical history, past surgical history and problem list     Review of Systems   Constitutional: Negative  HENT: Negative  Eyes: Negative  Respiratory: Negative  Cardiovascular: Negative  Gastrointestinal: Positive for nausea  Endocrine: Negative  Genitourinary: Negative  Musculoskeletal: Negative  Skin: Negative  Neurological: Negative  Hematological: Negative  Psychiatric/Behavioral: Negative          Current Outpatient Medications   Medication Sig Dispense Refill    acetaminophen (TYLENOL) 500 mg tablet Take 500 mg by mouth every 6 (six) hours as needed for mild pain      Ascorbic Acid (vitamin C) 1000 MG tablet Take 1,000 mg by mouth daily      azelastine (OPTIVAR) 0 05 % ophthalmic solution Administer 1 drop to both eyes daily as needed      Calcium Carb-Cholecalciferol (CALCIUM 1000 + D PO) Take 2,000 Units by mouth        Cholecalciferol (Vitamin D) 50 MCG (2000 UT) CAPS Take 2,000 mg by mouth daily      Cholecalciferol (Vitamin D3) 25 MCG (1000 UT) CAPS Take 1 capsule by mouth 2 (two) times a day      diphenhydrAMINE HCl (BENADRYL ALLERGY PO) Take by mouth      LUTEIN PO Take 1 tablet by mouth daily      methenamine hippurate (HIPREX) 1 g tablet Take 1 g by mouth 2 (two) times a day      metroNIDAZOLE (METROCREAM) 0 75 % cream Apply topically 2 (two) times a day      Probiotic Product (CULTURELLE PROBIOTICS PO) Take by mouth      VITAMIN K PO Take by mouth      Vitamins A & D (VITAMIN A & D PO) Take by mouth      oxyCODONE (ROXICODONE) 5 mg immediate release tablet 1 -2 tabs by mouth every 4-6 hour as needed 10 tablet 0     No current facility-administered medications for this visit  Objective:    Blood pressure 140/72, pulse 78, temperature 97 5 °F (36 4 °C), resp  rate 18, height 5' 6" (1 676 m), weight 54 4 kg (120 lb)  Body mass index is 19 37 kg/m²  Body surface area is 1 61 meters squared  Physical Exam  Constitutional:       Appearance: She is well-developed  HENT:      Head: Normocephalic and atraumatic  Neck:      Thyroid: No thyromegaly  Cardiovascular:      Rate and Rhythm: Normal rate and regular rhythm  Heart sounds: Normal heart sounds  Pulmonary:      Effort: Pulmonary effort is normal       Breath sounds: Normal breath sounds  Abdominal:      General: Bowel sounds are normal       Palpations: Abdomen is soft  Comments: Well healed laparoscopic incisions  Genitourinary:     Comments: deferred    Musculoskeletal:         General: Normal range of motion  Cervical back: Normal range of motion and neck supple  Lymphadenopathy:      Cervical: No cervical adenopathy  Skin:     General: Skin is warm and dry     Neurological:      Mental Status: She is alert and oriented to person, place, and time     Psychiatric:         Behavior: Behavior normal          No results found for:   Lab Results   Component Value Date    K 3 7 05/21/2021     05/21/2021    CO2 31 05/21/2021    BUN 12 05/21/2021    CREATININE 0 67 05/21/2021    GLUF 98 05/21/2021    CALCIUM 8 9 05/21/2021    CORRECTEDCA 9 4 05/21/2021    AST 35 05/21/2021    ALT 50 05/21/2021    ALKPHOS 118 (H) 05/21/2021    EGFR 88 05/21/2021     Lab Results   Component Value Date    WBC 5 76 05/21/2021    HGB 13 9 05/21/2021    HCT 43 6 05/21/2021    MCV 87 05/21/2021     05/21/2021     Lab Results   Component Value Date    NEUTROABS 2 59 05/21/2021        Trend:  No results found for:

## 2021-06-16 NOTE — LETTER
June 16, 2021     Danitza Epperson MD  [address] [place] [address]    Patient: Edyta Michaels   YOB: 1950   Date of Visit: 6/16/2021       Dear Dr Esau Hooper: Thank you for referring Edyta Michaels to me for evaluation  Below are my notes for this consultation  If you have questions, please do not hesitate to call me  I look forward to following your patient along with you  Sincerely,        Beatris Pina MD        CC: Gricelda Victor MD  6/16/2021 10:33 AM  Sign when Signing Visit  Assessment/Plan:    Problem List Items Addressed This Visit     None            CHIEF COMPLAINT:   Treatment planning endometrial cancer      Problem:  Cancer Staging  Endometrial cancer [facility])  Staging form: Corpus Uteri - Carcinoma, AJCC 8th Edition  - Clinical: FIGO Stage I (cT1, cN0, cM0) - Signed by Beatris Pina MD on 5/19/2021        Previous therapy:  Oncology History   Endometrial cancer ([facility] [address] )   5/15/2021 Initial Diagnosis    Endometrial cancer ([facility] [address] )     5/19/2021 -  Cancer Staged    Staging form: Corpus Uteri - Carcinoma, AJCC 8th Edition  - Clinical: FIGO Stage I (cT1, cN0, cM0) - Signed by Beatris Pina MD on 5/19/2021  Histologic grade (G): G3  Histologic grading system: 3 grade system       5/2021 Surgery      Robotically assisted total laparoscopic hysterectomy bilateral salpingo-oophorectomy and sentinel lymph node mapping and biopsy for stage III C1 grade 3 endometrial cancer with 17 of 18 mm invasion, cervical stromal involvement and bilateral positive nodes in the pelvis  Patient has also had small 2-5 mm pulmonary nodules which are too small to characterize as   Metastatic disease  The patient has a history of multiple pneumonias as a child       6/16/2021 -  Cancer Staged    Staging form: Corpus Uteri - Carcinoma, AJCC 8th Edition  - Pathologic: FIGO Stage IIIC1 (pT2, pN1, cM0) - Signed by Beatris Pina MD on 6/16/2021  Histologic grade (G): G3  Histologic grading system: 3 grade system  Residual tumor (R): R0 - None             Patient ID: Genesis Griffin is a 66 y o  female    Patient is very pleasant 69-year-old white female with a recent robotically assisted total laparoscopic hysterectomy bilateral salpingo-oophorectomy and sentinel lymph node mapping and biopsies for stage III C1 grade 3 endometrial cancer  Preoperative CT scan reveals multiple 2 to 5 mm nodules in the chest of unsure significance  Postoperatively the patient has done well  She has no significant complaints  Her pain is well controlled  She has no significant bleeding  Today, the patient is doing well  She denies significant abdominal pain, pelvic pain, nausea, vomiting, constipation, diarrhea, fevers, chills, or vaginal bleeding  The following portions of the patient's history were reviewed and updated as appropriate: allergies, current medications, past family history, past medical history, past surgical history and problem list     Review of Systems   Constitutional: Negative  HENT: Negative  Eyes: Negative  Respiratory: Negative  Cardiovascular: Negative  Gastrointestinal: Positive for nausea  Endocrine: Negative  Genitourinary: Negative  Musculoskeletal: Negative  Skin: Negative  Neurological: Negative  Hematological: Negative  Psychiatric/Behavioral: Negative          Current Outpatient Medications   Medication Sig Dispense Refill    acetaminophen (TYLENOL) 500 mg tablet Take 500 mg by mouth every 6 (six) hours as needed for mild pain      Ascorbic Acid (vitamin C) 1000 MG tablet Take 1,000 mg by mouth daily      azelastine (OPTIVAR) 0 05 % ophthalmic solution Administer 1 drop to both eyes daily as needed      Calcium Carb-Cholecalciferol (CALCIUM 1000 + D PO) Take 2,000 Units by mouth        Cholecalciferol (Vitamin D) 50 MCG (2000 UT) CAPS Take 2,000 mg by mouth daily      Cholecalciferol (Vitamin D3) 25 MCG (1000 UT) CAPS Take 1 capsule by mouth 2 (two) times a day      diphenhydrAMINE HCl (BENADRYL ALLERGY PO) Take by mouth      LUTEIN PO Take 1 tablet by mouth daily      methenamine hippurate (HIPREX) 1 g tablet Take 1 g by mouth 2 (two) times a day      metroNIDAZOLE (METROCREAM) 0 75 % cream Apply topically 2 (two) times a day      Probiotic Product (CULTURELLE PROBIOTICS PO) Take by mouth      VITAMIN K PO Take by mouth      Vitamins A & D (VITAMIN A & D PO) Take by mouth      oxyCODONE (ROXICODONE) 5 mg immediate release tablet 1 -2 tabs by mouth every 4-6 hour as needed 10 tablet 0     No current facility-administered medications for this visit  Objective:    Blood pressure 140/72, pulse 78, temperature 97 5 °F (36 4 °C), resp  rate 18, height 5' 6" (1 676 m), weight 54 4 kg (120 lb)  Body mass index is 19 37 kg/m²  Body surface area is 1 61 meters squared  Physical Exam  Constitutional:       Appearance: She is well-developed  HENT:      Head: Normocephalic and atraumatic  Neck:      Thyroid: No thyromegaly  Cardiovascular:      Rate and Rhythm: Normal rate and regular rhythm  Heart sounds: Normal heart sounds  Pulmonary:      Effort: Pulmonary effort is normal       Breath sounds: Normal breath sounds  Abdominal:      General: Bowel sounds are normal       Palpations: Abdomen is soft  Comments: Well healed laparoscopic incisions  Genitourinary:     Comments: deferred    Musculoskeletal:         General: Normal range of motion  Cervical back: Normal range of motion and neck supple  Lymphadenopathy:      Cervical: No cervical adenopathy  Skin:     General: Skin is warm and dry  Neurological:      Mental Status: She is alert and oriented to person, place, and time     Psychiatric:         Behavior: Behavior normal          No results found for:   Lab Results   Component Value Date    K 3 7 05/21/2021     05/21/2021    CO2 31 05/21/2021    BUN 12 05/21/2021 CREATININE 0 67 05/21/2021    GLUF 98 05/21/2021    CALCIUM 8 9 05/21/2021    CORRECTEDCA 9 4 05/21/2021    AST 35 05/21/2021    ALT 50 05/21/2021    ALKPHOS 118 (H) 05/21/2021    EGFR 88 05/21/2021     Lab Results   Component Value Date    WBC 5 76 05/21/2021    HGB 13 9 05/21/2021    HCT 43 6 05/21/2021    MCV 87 05/21/2021     05/21/2021     Lab Results   Component Value Date    NEUTROABS 2 59 05/21/2021        Trend:  No results found for:

## 2021-06-16 NOTE — ASSESSMENT & PLAN NOTE
Patient is very pleasant 65-year-old female with a recent diagnosis of stage IIIC 1 grade 3 endometrial adenocarcinoma with possible lung metastasis as evidence by small pulmonary nodules in the 2-5 mm range  The patient has a history of multiple pneumonias as a child  With regard to this we have discussed treatment options with chemo radiation therapy  The patient would prefer not to have chemotherapy if she did not absolutely have to at this point  We will discuss the patient's treatment planning at upcoming tumor board and will see the patient back in 2 weeks for further discussion and bimanual exam    I have given the patient information sheets regarding carboplatin and paclitaxel chemotherapy

## 2021-06-18 ENCOUNTER — DOCUMENTATION (OUTPATIENT)
Dept: OTHER | Facility: HOSPITAL | Age: 71
End: 2021-06-18

## 2021-06-18 ENCOUNTER — DOCUMENTATION (OUTPATIENT)
Dept: GYNECOLOGIC ONCOLOGY | Facility: CLINIC | Age: 71
End: 2021-06-18

## 2021-06-18 NOTE — PROGRESS NOTES
Her case was presented at the multidisciplinary Gynecologic Tumor Conference on 6/18/21 and the consensus recommendation is: consider chemotherapy for this at least stage IIIC2 high-grade endometrial cancer

## 2021-06-30 ENCOUNTER — OFFICE VISIT (OUTPATIENT)
Dept: GYNECOLOGIC ONCOLOGY | Facility: CLINIC | Age: 71
End: 2021-06-30
Payer: MEDICARE

## 2021-06-30 VITALS
HEIGHT: 66 IN | RESPIRATION RATE: 17 BRPM | OXYGEN SATURATION: 97 % | BODY MASS INDEX: 19.77 KG/M2 | TEMPERATURE: 98.2 F | WEIGHT: 123 LBS | HEART RATE: 91 BPM | SYSTOLIC BLOOD PRESSURE: 114 MMHG | DIASTOLIC BLOOD PRESSURE: 76 MMHG

## 2021-06-30 DIAGNOSIS — C54.1 ENDOMETRIAL CANCER (HCC): Primary | ICD-10-CM

## 2021-06-30 PROCEDURE — 99024 POSTOP FOLLOW-UP VISIT: CPT | Performed by: OBSTETRICS & GYNECOLOGY

## 2021-06-30 NOTE — ASSESSMENT & PLAN NOTE
Patient is very pleasant 78-year-old female with a history of at least stage IIIC1 grade 3 endometrial adenocarcinoma  We have discussed that her CT scan does indicate some pulmonary lesions however these are not definitive metastasis  Therefore this patient could be treated as stage IIIC or stage IV B  The patient was discussed in tumor Board and it was recommended to initiate treatment with carboplatin paclitaxel x6 which I agree with  We discussed with patient risks and benefits of treatment including low blood counts requiring transfusion infection neuropathy kidney disease  The patient understands accepts the risks of treatment and has signed an informed consent  Due to the patient's history of low platelet count we have recommended starting with carboplatin area under the curve of 5 and paclitaxel at 135 milligrams/meter squared  We have recommended 3 weekly cycles with 6 separate treatments  A CT scan will be performed mid treatment to review the lung lesions  A PET-CT scan will be performed toward the end of treatment after consideration of radiation therapy  New     All questions were answered  A port was recommended and accepted by the patient  An IR consultation was placed

## 2021-06-30 NOTE — LETTER
June 30, 2021     Catia Yepez, [address] [place] [address]    Patient: Hermes Fields   YOB: 1950   Date of Visit: 6/30/2021       Dear Dr Cisneros Arabia: Thank you for referring Hermes Fields to me for evaluation  Below are my notes for this consultation  If you have questions, please do not hesitate to call me  I look forward to following your patient along with you  Sincerely,        Ani Olivares MD        CC: MD Ani Wheeler MD  6/30/2021 12:23 PM  Sign when Signing Visit  Assessment/Plan:    Problem List Items Addressed This Visit        Genitourinary    Endometrial cancer [facility]) - Primary     Patient is very pleasant 43-year-old female with a history of at least stage IIIC1 grade 3 endometrial adenocarcinoma  We have discussed that her CT scan does indicate some pulmonary lesions however these are not definitive metastasis  Therefore this patient could be treated as stage IIIC or stage IV B  The patient was discussed in tumor Board and it was recommended to initiate treatment with carboplatin paclitaxel x6 which I agree with  We discussed with patient risks and benefits of treatment including low blood counts requiring transfusion infection neuropathy kidney disease  The patient understands accepts the risks of treatment and has signed an informed consent  Due to the patient's history of low platelet count we have recommended starting with carboplatin area under the curve of 5 and paclitaxel at 135 milligrams/meter squared  We have recommended 3 weekly cycles with 6 separate treatments  A CT scan will be performed mid treatment to review the lung lesions  A PET-CT scan will be performed toward the end of treatment after consideration of radiation therapy  New     All questions were answered  A port was recommended and accepted by the patient  An IR consultation was placed           Relevant Orders Ambulatory referral to Interventional Radiology            CHIEF COMPLAINT:   Treatment planning stage  IIIC 1 grade 3 endometrial adenocarcinoma  Problem:  Cancer Staging  Endometrial cancer [facility])  Staging form: Corpus Uteri - Carcinoma, AJCC 8th Edition  - Clinical: FIGO Stage I (cT1, cN0, cM0) - Signed by Dawna Cannon MD on 5/19/2021  - Pathologic: FIGO Stage IIIC1 (pT2, pN1, cM0) - Signed by Dawna Cannon MD on 6/16/2021        Previous therapy:  Oncology History   Endometrial cancer ([facility] )   5/15/2021 Initial Diagnosis    Endometrial cancer ([facility] [address] )     5/19/2021 -  Cancer Staged    Staging form: Corpus Uteri - Carcinoma, AJCC 8th Edition  - Clinical: FIGO Stage I (cT1, cN0, cM0) - Signed by Dawna Cannon MD on 5/19/2021  Histologic grade (G): G3  Histologic grading system: 3 grade system       5/2021 Surgery      Robotically assisted total laparoscopic hysterectomy bilateral salpingo-oophorectomy and sentinel lymph node mapping and biopsy for stage III C1 grade 3 endometrial cancer with 17 of 18 mm invasion, cervical stromal involvement and bilateral positive nodes in the pelvis  Patient has also had small 2-5 mm pulmonary nodules which are too small to characterize as   Metastatic disease  The patient has a history of multiple pneumonias as a child  6/16/2021 -  Cancer Staged    Staging form: Corpus Uteri - Carcinoma, AJCC 8th Edition  - Pathologic: FIGO Stage IIIC1 (pT2, pN1, cM0) - Signed by Dawna Cannon MD on 6/16/2021  Histologic grade (G): G3  Histologic grading system: 3 grade system  Residual tumor (R): R0 - None             Patient ID: Andrei Carbajal is a 66 y o  female    Patient is very pleasant 18-year-old female with  A history of grade 3 endometrial cancer she recently underwent robotically assisted total laparoscopic hysterectomy bilateral salpingo-oophorectomy and sentinel lymph node mapping and biopsy    She was noted to have positive lymph nodes making her stage IIIC1  The patient underwent follow-up CT scan which reveals the following:   IMPRESSION:     CT chest:     Several pulmonary nodules, 5 mm and smaller with unknown long-term stability  Based on current Fleischner Society 2017 Guidelines on incidental pulmonary nodule, patients with a known malignancy are at increased risk of metastasis and should receive   initial three month follow-up chest CT      Multi lobar clustered micronodular opacities may be postinflammatory or postinfectious      CT abdomen and pelvis:     Evaluation slightly limited by lack of vascular and enteric contrast  Mildly enlarged 1 cm short axis pericaval and prominent subcentimeter short axis mesenteric and left para-aortic lymph nodes are present      Otherwise, no evidence of abdominopelvic metastatic disease insofar as can be detected on a noncontrast CT      This study demonstrates a significant  finding and was documented as such in [facility] for liaison and referring practitioner notification  Today, the patient is doing well  She denies significant abdominal pain, pelvic pain, nausea, vomiting, constipation, diarrhea, fevers, chills, or vaginal bleeding        The following portions of the patient's history were reviewed and updated as appropriate: allergies, current medications, past family history, past social history, past surgical history and problem list     Review of Systems    Current Outpatient Medications   Medication Sig Dispense Refill    acetaminophen (TYLENOL) 500 mg tablet Take 500 mg by mouth every 6 (six) hours as needed for mild pain      Ascorbic Acid (vitamin C) 1000 MG tablet Take 1,000 mg by mouth daily      azelastine (OPTIVAR) 0 05 % ophthalmic solution Administer 1 drop to both eyes daily as needed      Calcium Carb-Cholecalciferol (CALCIUM 1000 + D PO) Take 2,000 Units by mouth        Cholecalciferol (Vitamin D) 50 MCG (2000 UT) CAPS Take 2,000 mg by mouth daily      Cholecalciferol (Vitamin D3) 25 MCG (1000 UT) CAPS Take 1 capsule by mouth 2 (two) times a day      diphenhydrAMINE HCl (BENADRYL ALLERGY PO) Take by mouth      LUTEIN PO Take 1 tablet by mouth daily      methenamine hippurate (HIPREX) 1 g tablet Take 1 g by mouth 2 (two) times a day      metroNIDAZOLE (METROCREAM) 0 75 % cream Apply topically 2 (two) times a day      oxyCODONE (ROXICODONE) 5 mg immediate release tablet 1 -2 tabs by mouth every 4-6 hour as needed 10 tablet 0    Probiotic Product (CULTURELLE PROBIOTICS PO) Take by mouth      VITAMIN K PO Take by mouth      Vitamins A & D (VITAMIN A & D PO) Take by mouth       No current facility-administered medications for this visit  Objective:    Blood pressure 114/76, pulse 91, temperature 98 2 °F (36 8 °C), temperature source Temporal, resp  rate 17, height 5' 6" (1 676 m), weight 55 8 kg (123 lb), SpO2 97 %  Body mass index is 19 85 kg/m²  Body surface area is 1 63 meters squared      Physical Exam    No results found for:   Lab Results   Component Value Date    K 3 7 05/21/2021     05/21/2021    CO2 31 05/21/2021    BUN 12 05/21/2021    CREATININE 0 67 05/21/2021    GLUF 98 05/21/2021    CALCIUM 8 9 05/21/2021    CORRECTEDCA 9 4 05/21/2021    AST 35 05/21/2021    ALT 50 05/21/2021    ALKPHOS 118 (H) 05/21/2021    EGFR 88 05/21/2021     Lab Results   Component Value Date    WBC 5 76 05/21/2021    HGB 13 9 05/21/2021    HCT 43 6 05/21/2021    MCV 87 05/21/2021     05/21/2021     Lab Results   Component Value Date    NEUTROABS 2 59 05/21/2021        Trend:  No results found for:

## 2021-06-30 NOTE — PROGRESS NOTES
Assessment/Plan:    Problem List Items Addressed This Visit        Genitourinary    Endometrial cancer [facility]) - Primary     Patient is very pleasant 74-year-old female with a history of at least stage IIIC1 grade 3 endometrial adenocarcinoma  We have discussed that her CT scan does indicate some pulmonary lesions however these are not definitive metastasis  Therefore this patient could be treated as stage IIIC or stage IV B  The patient was discussed in tumor Board and it was recommended to initiate treatment with carboplatin paclitaxel x6 which I agree with  We discussed with patient risks and benefits of treatment including low blood counts requiring transfusion infection neuropathy kidney disease  The patient understands accepts the risks of treatment and has signed an informed consent  Due to the patient's history of low platelet count we have recommended starting with carboplatin area under the curve of 5 and paclitaxel at 135 milligrams/meter squared  We have recommended 3 weekly cycles with 6 separate treatments  A CT scan will be performed mid treatment to review the lung lesions  A PET-CT scan will be performed toward the end of treatment after consideration of radiation therapy  New     All questions were answered  A port was recommended and accepted by the patient  An IR consultation was placed  Relevant Orders    Ambulatory referral to Interventional Radiology            CHIEF COMPLAINT:   Treatment planning stage  IIIC 1 grade 3 endometrial adenocarcinoma        Problem:  Cancer Staging  Endometrial cancer [facility])  Staging form: Corpus Uteri - Carcinoma, AJCC 8th Edition  - Clinical: FIGO Stage I (cT1, cN0, cM0) - Signed by Dawna Cannon MD on 5/19/2021  - Pathologic: FIGO Stage IIIC1 (pT2, pN1, cM0) - Signed by Dawna Cannon MD on 6/16/2021        Previous therapy:  Oncology History   Endometrial cancer ([facility] [address] )   5/15/2021 Initial Diagnosis    Endometrial cancer ([facility] [address] ) 5/19/2021 -  Cancer Staged    Staging form: Corpus Uteri - Carcinoma, AJCC 8th Edition  - Clinical: FIGO Stage I (cT1, cN0, cM0) - Signed by Albert Guzman MD on 5/19/2021  Histologic grade (G): G3  Histologic grading system: 3 grade system       5/2021 Surgery      Robotically assisted total laparoscopic hysterectomy bilateral salpingo-oophorectomy and sentinel lymph node mapping and biopsy for stage III C1 grade 3 endometrial cancer with 17 of 18 mm invasion, cervical stromal involvement and bilateral positive nodes in the pelvis  Patient has also had small 2-5 mm pulmonary nodules which are too small to characterize as   Metastatic disease  The patient has a history of multiple pneumonias as a child  6/16/2021 -  Cancer Staged    Staging form: Corpus Uteri - Carcinoma, AJCC 8th Edition  - Pathologic: FIGO Stage IIIC1 (pT2, pN1, cM0) - Signed by Albert Guzman MD on 6/16/2021  Histologic grade (G): G3  Histologic grading system: 3 grade system  Residual tumor (R): R0 - None             Patient ID: Justin Choudhury is a 66 y o  female    Patient is very pleasant 69-year-old female with  A history of grade 3 endometrial cancer she recently underwent robotically assisted total laparoscopic hysterectomy bilateral salpingo-oophorectomy and sentinel lymph node mapping and biopsy  She was noted to have positive lymph nodes making her stage IIIC1  The patient underwent follow-up CT scan which reveals the following:   IMPRESSION:     CT chest:     Several pulmonary nodules, 5 mm and smaller with unknown long-term stability   Based on current Fleischner Society 2017 Guidelines on incidental pulmonary nodule, patients with a known malignancy are at increased risk of metastasis and should receive   initial three month follow-up chest CT      Multi lobar clustered micronodular opacities may be postinflammatory or postinfectious      CT abdomen and pelvis:     Evaluation slightly limited by lack of vascular and enteric contrast  Mildly enlarged 1 cm short axis pericaval and prominent subcentimeter short axis mesenteric and left para-aortic lymph nodes are present      Otherwise, no evidence of abdominopelvic metastatic disease insofar as can be detected on a noncontrast CT      This study demonstrates a significant  finding and was documented as such in [facility] for liaison and referring practitioner notification  Today, the patient is doing well  She denies significant abdominal pain, pelvic pain, nausea, vomiting, constipation, diarrhea, fevers, chills, or vaginal bleeding        The following portions of the patient's history were reviewed and updated as appropriate: allergies, current medications, past family history, past social history, past surgical history and problem list     Review of Systems    Current Outpatient Medications   Medication Sig Dispense Refill    acetaminophen (TYLENOL) 500 mg tablet Take 500 mg by mouth every 6 (six) hours as needed for mild pain      Ascorbic Acid (vitamin C) 1000 MG tablet Take 1,000 mg by mouth daily      azelastine (OPTIVAR) 0 05 % ophthalmic solution Administer 1 drop to both eyes daily as needed      Calcium Carb-Cholecalciferol (CALCIUM 1000 + D PO) Take 2,000 Units by mouth        Cholecalciferol (Vitamin D) 50 MCG (2000 UT) CAPS Take 2,000 mg by mouth daily      Cholecalciferol (Vitamin D3) 25 MCG (1000 UT) CAPS Take 1 capsule by mouth 2 (two) times a day      diphenhydrAMINE HCl (BENADRYL ALLERGY PO) Take by mouth      LUTEIN PO Take 1 tablet by mouth daily      methenamine hippurate (HIPREX) 1 g tablet Take 1 g by mouth 2 (two) times a day      metroNIDAZOLE (METROCREAM) 0 75 % cream Apply topically 2 (two) times a day      oxyCODONE (ROXICODONE) 5 mg immediate release tablet 1 -2 tabs by mouth every 4-6 hour as needed 10 tablet 0    Probiotic Product (CULTURELLE PROBIOTICS PO) Take by mouth      VITAMIN K PO Take by mouth      Vitamins A & D (VITAMIN A & D PO) Take by mouth       No current facility-administered medications for this visit  Objective:    Blood pressure 114/76, pulse 91, temperature 98 2 °F (36 8 °C), temperature source Temporal, resp  rate 17, height 5' 6" (1 676 m), weight 55 8 kg (123 lb), SpO2 97 %  Body mass index is 19 85 kg/m²  Body surface area is 1 63 meters squared      Physical Exam    No results found for:   Lab Results   Component Value Date    K 3 7 05/21/2021     05/21/2021    CO2 31 05/21/2021    BUN 12 05/21/2021    CREATININE 0 67 05/21/2021    GLUF 98 05/21/2021    CALCIUM 8 9 05/21/2021    CORRECTEDCA 9 4 05/21/2021    AST 35 05/21/2021    ALT 50 05/21/2021    ALKPHOS 118 (H) 05/21/2021    EGFR 88 05/21/2021     Lab Results   Component Value Date    WBC 5 76 05/21/2021    HGB 13 9 05/21/2021    HCT 43 6 05/21/2021    MCV 87 05/21/2021     05/21/2021     Lab Results   Component Value Date    NEUTROABS 2 59 05/21/2021        Trend:  No results found for:

## 2021-06-30 NOTE — H&P (VIEW-ONLY)
Assessment/Plan:    Problem List Items Addressed This Visit        Genitourinary    Endometrial cancer [facility]) - Primary     Patient is very pleasant 78-year-old female with a history of at least stage IIIC1 grade 3 endometrial adenocarcinoma  We have discussed that her CT scan does indicate some pulmonary lesions however these are not definitive metastasis  Therefore this patient could be treated as stage IIIC or stage IV B  The patient was discussed in tumor Board and it was recommended to initiate treatment with carboplatin paclitaxel x6 which I agree with  We discussed with patient risks and benefits of treatment including low blood counts requiring transfusion infection neuropathy kidney disease  The patient understands accepts the risks of treatment and has signed an informed consent  Due to the patient's history of low platelet count we have recommended starting with carboplatin area under the curve of 5 and paclitaxel at 135 milligrams/meter squared  We have recommended 3 weekly cycles with 6 separate treatments  A CT scan will be performed mid treatment to review the lung lesions  A PET-CT scan will be performed toward the end of treatment after consideration of radiation therapy  New     All questions were answered  A port was recommended and accepted by the patient  An IR consultation was placed  Relevant Orders    Ambulatory referral to Interventional Radiology            CHIEF COMPLAINT:   Treatment planning stage  IIIC 1 grade 3 endometrial adenocarcinoma        Problem:  Cancer Staging  Endometrial cancer [facility])  Staging form: Corpus Uteri - Carcinoma, AJCC 8th Edition  - Clinical: FIGO Stage I (cT1, cN0, cM0) - Signed by Frank Tai MD on 5/19/2021  - Pathologic: FIGO Stage IIIC1 (pT2, pN1, cM0) - Signed by Frank Tai MD on 6/16/2021        Previous therapy:  Oncology History   Endometrial cancer ([facility] [address] )   5/15/2021 Initial Diagnosis    Endometrial cancer ([facility] [address] ) 5/19/2021 -  Cancer Staged    Staging form: Corpus Uteri - Carcinoma, AJCC 8th Edition  - Clinical: FIGO Stage I (cT1, cN0, cM0) - Signed by Sukhdev Casarez MD on 5/19/2021  Histologic grade (G): G3  Histologic grading system: 3 grade system       5/2021 Surgery      Robotically assisted total laparoscopic hysterectomy bilateral salpingo-oophorectomy and sentinel lymph node mapping and biopsy for stage III C1 grade 3 endometrial cancer with 17 of 18 mm invasion, cervical stromal involvement and bilateral positive nodes in the pelvis  Patient has also had small 2-5 mm pulmonary nodules which are too small to characterize as   Metastatic disease  The patient has a history of multiple pneumonias as a child  6/16/2021 -  Cancer Staged    Staging form: Corpus Uteri - Carcinoma, AJCC 8th Edition  - Pathologic: FIGO Stage IIIC1 (pT2, pN1, cM0) - Signed by Sukhdev Casarez MD on 6/16/2021  Histologic grade (G): G3  Histologic grading system: 3 grade system  Residual tumor (R): R0 - None             Patient ID: Fady Bess is a 66 y o  female    Patient is very pleasant 65-year-old female with  A history of grade 3 endometrial cancer she recently underwent robotically assisted total laparoscopic hysterectomy bilateral salpingo-oophorectomy and sentinel lymph node mapping and biopsy  She was noted to have positive lymph nodes making her stage IIIC1  The patient underwent follow-up CT scan which reveals the following:   IMPRESSION:     CT chest:     Several pulmonary nodules, 5 mm and smaller with unknown long-term stability   Based on current Fleischner Society 2017 Guidelines on incidental pulmonary nodule, patients with a known malignancy are at increased risk of metastasis and should receive   initial three month follow-up chest CT      Multi lobar clustered micronodular opacities may be postinflammatory or postinfectious      CT abdomen and pelvis:     Evaluation slightly limited by lack of vascular and enteric contrast  Mildly enlarged 1 cm short axis pericaval and prominent subcentimeter short axis mesenteric and left para-aortic lymph nodes are present      Otherwise, no evidence of abdominopelvic metastatic disease insofar as can be detected on a noncontrast CT      This study demonstrates a significant  finding and was documented as such in [facility] for liaison and referring practitioner notification  Today, the patient is doing well  She denies significant abdominal pain, pelvic pain, nausea, vomiting, constipation, diarrhea, fevers, chills, or vaginal bleeding        The following portions of the patient's history were reviewed and updated as appropriate: allergies, current medications, past family history, past social history, past surgical history and problem list     Review of Systems    Current Outpatient Medications   Medication Sig Dispense Refill    acetaminophen (TYLENOL) 500 mg tablet Take 500 mg by mouth every 6 (six) hours as needed for mild pain      Ascorbic Acid (vitamin C) 1000 MG tablet Take 1,000 mg by mouth daily      azelastine (OPTIVAR) 0 05 % ophthalmic solution Administer 1 drop to both eyes daily as needed      Calcium Carb-Cholecalciferol (CALCIUM 1000 + D PO) Take 2,000 Units by mouth        Cholecalciferol (Vitamin D) 50 MCG (2000 UT) CAPS Take 2,000 mg by mouth daily      Cholecalciferol (Vitamin D3) 25 MCG (1000 UT) CAPS Take 1 capsule by mouth 2 (two) times a day      diphenhydrAMINE HCl (BENADRYL ALLERGY PO) Take by mouth      LUTEIN PO Take 1 tablet by mouth daily      methenamine hippurate (HIPREX) 1 g tablet Take 1 g by mouth 2 (two) times a day      metroNIDAZOLE (METROCREAM) 0 75 % cream Apply topically 2 (two) times a day      oxyCODONE (ROXICODONE) 5 mg immediate release tablet 1 -2 tabs by mouth every 4-6 hour as needed 10 tablet 0    Probiotic Product (CULTURELLE PROBIOTICS PO) Take by mouth      VITAMIN K PO Take by mouth      Vitamins A & D (VITAMIN A & D PO) Take by mouth       No current facility-administered medications for this visit  Objective:    Blood pressure 114/76, pulse 91, temperature 98 2 °F (36 8 °C), temperature source Temporal, resp  rate 17, height 5' 6" (1 676 m), weight 55 8 kg (123 lb), SpO2 97 %  Body mass index is 19 85 kg/m²  Body surface area is 1 63 meters squared      Physical Exam    No results found for:   Lab Results   Component Value Date    K 3 7 05/21/2021     05/21/2021    CO2 31 05/21/2021    BUN 12 05/21/2021    CREATININE 0 67 05/21/2021    GLUF 98 05/21/2021    CALCIUM 8 9 05/21/2021    CORRECTEDCA 9 4 05/21/2021    AST 35 05/21/2021    ALT 50 05/21/2021    ALKPHOS 118 (H) 05/21/2021    EGFR 88 05/21/2021     Lab Results   Component Value Date    WBC 5 76 05/21/2021    HGB 13 9 05/21/2021    HCT 43 6 05/21/2021    MCV 87 05/21/2021     05/21/2021     Lab Results   Component Value Date    NEUTROABS 2 59 05/21/2021        Trend:  No results found for:

## 2021-07-01 ENCOUNTER — TELEPHONE (OUTPATIENT)
Dept: GYNECOLOGIC ONCOLOGY | Facility: CLINIC | Age: 71
End: 2021-07-01

## 2021-07-02 ENCOUNTER — ANESTHESIA EVENT (OUTPATIENT)
Dept: PERIOP | Facility: AMBULARY SURGERY CENTER | Age: 71
End: 2021-07-02
Payer: MEDICARE

## 2021-07-08 ENCOUNTER — TELEPHONE (OUTPATIENT)
Dept: SURGICAL ONCOLOGY | Facility: CLINIC | Age: 71
End: 2021-07-08

## 2021-07-08 RX ORDER — MULTIVIT WITH MINERALS/LUTEIN
1000 TABLET ORAL DAILY
COMMUNITY
End: 2021-10-04 | Stop reason: HOSPADM

## 2021-07-08 NOTE — TELEPHONE ENCOUNTER
Patient called to speak with the Nurse regarding her Treatment plan going forward  She did receive her Chemo schedule but said she has questions regarding Infusion and location of treatments   Best number to reach patient is [phone]

## 2021-07-08 NOTE — PRE-PROCEDURE INSTRUCTIONS
Pre-Surgery Instructions:   Medication Instructions    acetaminophen (TYLENOL) 500 mg tablet Instructed patient per Anesthesia Guidelines   Ascorbic Acid (vitamin C) 1000 MG tablet Patient was instructed by Physician and understands   azelastine (OPTIVAR) 0 05 % ophthalmic solution Instructed patient per Anesthesia Guidelines   Calcium Carb-Cholecalciferol (CALCIUM 1000 + D PO) Patient was instructed by Physician and understands   Cholecalciferol (Vitamin D3) 25 MCG (1000 UT) CAPS Patient was instructed by Physician and understands   Cranberry 1000 MG CAPS Patient was instructed by Physician and understands   diphenhydrAMINE HCl (BENADRYL ALLERGY PO) Instructed patient per Anesthesia Guidelines   LUTEIN PO Patient was instructed by Physician and understands   metroNIDAZOLE (METROCREAM) 0 75 % cream Instructed patient per Anesthesia Guidelines   ondansetron (ZOFRAN) 8 mg tablet Instructed patient per Anesthesia Guidelines   Probiotic Product (CULTURELLE PROBIOTICS PO) Patient was instructed by Physician and understands   vitamin E, tocopherol, 1,000 units capsule Patient was instructed by Physician and understands   VITAMIN K PO Patient was instructed by Physician and understands   Vitamins A & D (VITAMIN A & D PO) Patient was instructed by Physician and understands  Pre op and bathing instructions reviewed  Pt has hibiclens  Pt  Verbalized understanding of current visitor restrictions  Pt  Verbalized an understanding of all instructions reviewed and offers no concerns at this time  Instructed to avoid all ASA/NSAIDs and OTC Vit/Supp from now until after surgery   Tylenol ok prn

## 2021-07-12 ENCOUNTER — APPOINTMENT (OUTPATIENT)
Dept: LAB | Facility: HOSPITAL | Age: 71
End: 2021-07-12
Payer: MEDICARE

## 2021-07-12 ENCOUNTER — TELEPHONE (OUTPATIENT)
Dept: SURGICAL ONCOLOGY | Facility: CLINIC | Age: 71
End: 2021-07-12

## 2021-07-12 NOTE — TELEPHONE ENCOUNTER
Patient called to verify if labs needed to be done prior to the port placement that is scheduled for tomorrow on 7/13/21, verified with Brittany Nazario that the labs should be done today if possible, verified with [facility] and she will have her labs done today

## 2021-07-13 ENCOUNTER — ANESTHESIA (OUTPATIENT)
Dept: PERIOP | Facility: AMBULARY SURGERY CENTER | Age: 71
End: 2021-07-13
Payer: MEDICARE

## 2021-07-13 ENCOUNTER — HOSPITAL ENCOUNTER (OUTPATIENT)
Facility: AMBULARY SURGERY CENTER | Age: 71
Setting detail: OUTPATIENT SURGERY
Discharge: HOME | End: 2021-07-13
Attending: RADIOLOGY | Admitting: RADIOLOGY
Payer: MEDICARE

## 2021-07-13 ENCOUNTER — APPOINTMENT (OUTPATIENT)
Dept: RADIOLOGY | Facility: AMBULARY SURGERY CENTER | Age: 71
End: 2021-07-13
Payer: MEDICARE

## 2021-07-13 VITALS
OXYGEN SATURATION: 99 % | SYSTOLIC BLOOD PRESSURE: 154 MMHG | HEIGHT: 66 IN | RESPIRATION RATE: 18 BRPM | WEIGHT: 123 LBS | HEART RATE: 99 BPM | DIASTOLIC BLOOD PRESSURE: 71 MMHG | TEMPERATURE: 98.6 F | BODY MASS INDEX: 19.77 KG/M2

## 2021-07-13 PROCEDURE — C1788 PORT, INDWELLING, IMP: HCPCS | Performed by: RADIOLOGY

## 2021-07-13 PROCEDURE — 77001 FLUOROGUIDE FOR VEIN DEVICE: CPT | Performed by: RADIOLOGY

## 2021-07-13 PROCEDURE — 77001 FLUOROGUIDE FOR VEIN DEVICE: CPT

## 2021-07-13 PROCEDURE — 36561 INSERT TUNNELED CV CATH: CPT | Performed by: RADIOLOGY

## 2021-07-13 PROCEDURE — 76937 US GUIDE VASCULAR ACCESS: CPT | Performed by: RADIOLOGY

## 2021-07-13 DEVICE — PORT DIGINTY 8FR MICRO-STICK KIT HEMODIAL MID SIZE: Type: IMPLANTABLE DEVICE | Site: CHEST | Status: FUNCTIONAL

## 2021-07-13 RX ORDER — ONDANSETRON 2 MG/ML
INJECTION INTRAMUSCULAR; INTRAVENOUS AS NEEDED
Status: DISCONTINUED | OUTPATIENT
Start: 2021-07-13 | End: 2021-07-13

## 2021-07-13 RX ORDER — SODIUM CHLORIDE, SODIUM LACTATE, POTASSIUM CHLORIDE, CALCIUM CHLORIDE 600; 310; 30; 20 MG/100ML; MG/100ML; MG/100ML; MG/100ML
INJECTION, SOLUTION INTRAVENOUS CONTINUOUS PRN
Status: DISCONTINUED | OUTPATIENT
Start: 2021-07-13 | End: 2021-07-13

## 2021-07-13 RX ORDER — SODIUM CHLORIDE 9 MG/ML
INJECTION, SOLUTION INTRAVENOUS AS NEEDED
Status: DISCONTINUED | OUTPATIENT
Start: 2021-07-13 | End: 2021-07-13 | Stop reason: HOSPADM

## 2021-07-13 RX ORDER — CLINDAMYCIN PHOSPHATE 600 MG/50ML
600 INJECTION INTRAVENOUS ONCE
Status: COMPLETED | OUTPATIENT
Start: 2021-07-13 | End: 2021-07-13

## 2021-07-13 RX ORDER — LIDOCAINE HYDROCHLORIDE AND EPINEPHRINE 10; 10 MG/ML; UG/ML
INJECTION, SOLUTION INFILTRATION; PERINEURAL AS NEEDED
Status: DISCONTINUED | OUTPATIENT
Start: 2021-07-13 | End: 2021-07-13 | Stop reason: HOSPADM

## 2021-07-13 RX ADMIN — SODIUM CHLORIDE, SODIUM LACTATE, POTASSIUM CHLORIDE, AND CALCIUM CHLORIDE: .6; .31; .03; .02 INJECTION, SOLUTION INTRAVENOUS at 11:45

## 2021-07-13 RX ADMIN — ONDANSETRON 4 MG: 2 INJECTION INTRAMUSCULAR; INTRAVENOUS at 12:49

## 2021-07-13 RX ADMIN — CLINDAMYCIN IN 5 PERCENT DEXTROSE 600 MG: 12 INJECTION, SOLUTION INTRAVENOUS at 12:28

## 2021-07-13 NOTE — OP NOTE
Chest port placement  7/13/21     History:      Endometrial carcinoma     Contrast: none     Procedure: The patient was identified verbally and by wristband  Timeout was performed  Informed consent was obtained  All elements of maximal sterile barrier technique were followed (cap, mask, sterile gown, sterile gloves, large sterile sheet, hand hygiene and 2% chlorhexidine for cutaneous antisepsis)  Following obtaining informed consent, the patient was prepped and draped in the usual sterile fashion  Using ultrasound guidance, access was gained to the patient's right nternal jugular vein using a micropuncture system  The micropuncture wire was removed and a  035 wire was advanced to the level of the inferior vena cava using fluoroscopic guidance  Using 1% lidocaine, the region of the right anterior chest was was anesthetized  A small incision was made using a 15 blade scalpel  The port pocket was created using blunt dissection  The catheter tubing was tunneled from the incision to the venotomy  The micropuncture dilator was exchanged for a peel-away sheath, using fluoroscopic guidance  The catheter was place through the peel-away sheath  The catheter was measured and cut to size  The catheter was attached to the port  The port was flushed with saline to ensure patency without evidence of leakage  The port was placed in the port pocket  The port was sutured in the pocket using 3-0 Vicryl  The incisions were approximated with 3-0 Vicryl and tissue adhesive  The patient tolerated the procedure well without apparent immediate complications  The patient left the IR department in unchanged condition  Dr Yumiko Cole performed and directly supervised the entire procedure  Findings:      Using ultrasound guidance, the right internal jugular vein was cannulated using Seldinger technique  The right internal jugular vein was evaluated as a potential access site   The right internal jugular vein was patent, and free of thrombus  Static images of the vessel was obtained  Visualization of real time needle entry into the vessel was obtained  Fluoroscopic spot image demonstrates a newly placed single lumen chest port via the right internal jugular vein with the most central aspect at the SVC/RA junction  The catheter tubing is smooth in contour  IMPRESSION:     Successful placement of a single lumen chest port via the right internal jugular vein

## 2021-07-13 NOTE — ANESTHESIA POSTPROCEDURE EVALUATION
Post-Op Assessment Note    CV Status:  Stable  Pain Score: 0    Pain management: adequate     Mental Status:  Awake   Hydration Status:  Stable   PONV Controlled:  Controlled   Airway Patency:  Patent      Post Op Vitals Reviewed: Yes      Staff: CRNA         No complications documented      BP      Temp      Pulse     Resp      SpO2

## 2021-07-13 NOTE — DISCHARGE INSTRUCTIONS
Implanted Venous Access Port     WHAT YOU NEED TO KNOW:   An implanted venous access port is a device used to give treatments and take blood  It may also be called a central venous access device (CVAD)  The port is a small container that is placed under your skin, usually in your upper chest  The port is attached to a catheter that enters a large vein  DISCHARGE INSTRUCTIONS:   Resume your normal diet  Small sips of flat soda will help with mild nausea  Prevent an infection:   · Wash your hands often  Use soap and water  Clean your hands before and after you care for your port  Remind everyone who cares for your port to wash their hands  · Check your skin for infection every day  Look for redness, swelling, or fluid oozing from the port site  Care for your port:   1  You may shower beginning 48 hours after procedure  2  Change dressing if it becomes wet  3  Remove dressing after 24 hours  Leave glue in place  4  It is normal for some bruising to occur  5  Use Tylenol for pain  6  Limit use of arm on the side that your port was placed  Lift nothing heavier than 5 pounds for 1 week, and then gradually increase activity as tolerated  7  DO NOT apply ointment, lotion or cream to port site until incision is healed  Allow glue to fall off  DO NOT attempt to peel glue from skin even it it begins to flake  8, After the port incision is healed you may swim, bathe  Notify the Interventional Radiologist if you have any of the followin  Fever above 101 F    2  Increased redness or swelling after 1st day  3  Increased pain after 1st day  4  Any sign of infection (drainage from port site, skin separation, hot to touch)  5  Persistent nausea or vomiting  Contact Interventional Radiology at [phone] [facility] PATIENTS: Contact Interventional Radiology at [phone]) ([address]: Contact Interventional Radiology at [phone])  radiology results/lab results

## 2021-07-13 NOTE — ANESTHESIA PREPROCEDURE EVALUATION
Procedure:  INSERTION VENOUS PORT ( PORT-A-CATH) IR (N/A Chest)    Relevant Problems   GYN   (+) Endometrial cancer (HCC)   (+) History of robot-assisted laparoscopic hysterectomy, BSO, SLND      NEURO/PSYCH   (+) H/O uterine prolapse        Physical Exam    Airway    Mallampati score: III  TM Distance: >3 FB  Neck ROM: full     Dental       Cardiovascular      Pulmonary  Breath sounds clear to auscultation,     Other Findings        Anesthesia Plan  ASA Score- 2     Anesthesia Type- IV sedation with anesthesia with ASA Monitors  Additional Monitors:   Airway Plan:           Plan Factors-Exercise tolerance (METS): >4 METS  Chart reviewed  EKG reviewed  Existing labs reviewed  Patient summary reviewed  Patient is not a current smoker  Induction-     Postoperative Plan-     Informed Consent- Anesthetic plan and risks discussed with patient  I personally reviewed this patient with the CRNA  Discussed and agreed on the Anesthesia Plan with the CRNA  Reggie Hand

## 2021-07-13 NOTE — INTERVAL H&P NOTE
Patient arrived to [facility] for port placement    The procedure and risks were discussed with the patient  All questions were answered  Informed consent was obtained  H & P reviewed after examining the patient and I find no changes in the patient condition since the H & P has been written  /69   Pulse 94   Temp 98 5 °F (36 9 °C) (Temporal)   Resp 18   Ht 5' 6" (1 676 m)   Wt 55 8 kg (123 lb)   SpO2 100%   BMI 19 85 kg/m²     Patient re-evaluated   Accept as history and physical     Christie Zee, DO/July 13July 13, 2021/11:41 AM

## 2021-07-19 ENCOUNTER — TELEPHONE (OUTPATIENT)
Dept: INFUSION CENTER | Facility: CLINIC | Age: 71
End: 2021-07-19

## 2021-07-19 ENCOUNTER — HOSPITAL ENCOUNTER (OUTPATIENT)
Dept: INFUSION CENTER | Facility: CLINIC | Age: 71
Discharge: HOME | End: 2021-07-19
Payer: MEDICARE

## 2021-07-19 VITALS
SYSTOLIC BLOOD PRESSURE: 132 MMHG | BODY MASS INDEX: 20.13 KG/M2 | WEIGHT: 120.81 LBS | DIASTOLIC BLOOD PRESSURE: 68 MMHG | HEIGHT: 65 IN | HEART RATE: 84 BPM | TEMPERATURE: 97.4 F

## 2021-07-19 DIAGNOSIS — C54.1 ENDOMETRIAL CANCER (HCC): Primary | ICD-10-CM

## 2021-07-19 PROCEDURE — 96413 CHEMO IV INFUSION 1 HR: CPT

## 2021-07-19 PROCEDURE — 96417 CHEMO IV INFUS EACH ADDL SEQ: CPT

## 2021-07-19 PROCEDURE — 96415 CHEMO IV INFUSION ADDL HR: CPT

## 2021-07-19 PROCEDURE — 96375 TX/PRO/DX INJ NEW DRUG ADDON: CPT

## 2021-07-19 PROCEDURE — 96367 TX/PROPH/DG ADDL SEQ IV INF: CPT

## 2021-07-19 RX ORDER — SODIUM CHLORIDE 9 MG/ML
20 INJECTION, SOLUTION INTRAVENOUS ONCE
Status: COMPLETED | OUTPATIENT
Start: 2021-07-19 | End: 2021-07-19

## 2021-07-19 RX ORDER — PALONOSETRON 0.05 MG/ML
0.25 INJECTION, SOLUTION INTRAVENOUS ONCE
Status: COMPLETED | OUTPATIENT
Start: 2021-07-19 | End: 2021-07-19

## 2021-07-19 RX ADMIN — DIPHENHYDRAMINE HYDROCHLORIDE 25 MG: 50 INJECTION, SOLUTION INTRAMUSCULAR; INTRAVENOUS at 09:33

## 2021-07-19 RX ADMIN — CARBOPLATIN 436 MG: 10 INJECTION, SOLUTION INTRAVENOUS at 14:38

## 2021-07-19 RX ADMIN — SODIUM CHLORIDE 20 ML/HR: 0.9 INJECTION, SOLUTION INTRAVENOUS at 09:09

## 2021-07-19 RX ADMIN — DEXAMETHASONE SODIUM PHOSPHATE 20 MG: 10 INJECTION, SOLUTION INTRAMUSCULAR; INTRAVENOUS at 09:11

## 2021-07-19 RX ADMIN — PALONOSETRON 0.25 MG: 0.05 INJECTION, SOLUTION INTRAVENOUS at 11:11

## 2021-07-19 RX ADMIN — FAMOTIDINE 20 MG: 10 INJECTION, SOLUTION INTRAVENOUS at 10:04

## 2021-07-19 RX ADMIN — FOSAPREPITANT 150 MG: 150 INJECTION, POWDER, LYOPHILIZED, FOR SOLUTION INTRAVENOUS at 10:32

## 2021-07-19 RX ADMIN — PACLITAXEL 220.2 MG: 6 INJECTION, SOLUTION, CONCENTRATE INTRAVENOUS at 11:25

## 2021-07-19 NOTE — PROGRESS NOTES
Pt here for day 1 cycle 1 chemo, taxol/carbo  States she is a bit nervous but assured her we would help her along the way  She is on a methylprednisolone taper pack for a rash from her port placement  Damian Tao is aware and said to proceed with treatment  All questions were answered  Right port accessed with positive blood return noted throughout treatment  Tolerated infusions without incident  Pt would prefer to come here for her blood draws via port, we are adding her on to our schedule  Port flushed and de-accessed  AVS given  Walked out in stable condition

## 2021-07-19 NOTE — TELEPHONE ENCOUNTER
Pt called infusion center & left voicemail  She was wondering if she should still be coming for tx today  Her  has a virus with a low grade temp and she was also feeling nauseous  She also said she started with steroids yesterday for a rash from her port placement  Please advise on what pt should be doing  Her appt is for 8 am today

## 2021-07-20 PROBLEM — Z45.2 ENCOUNTER FOR CENTRAL LINE CARE: Status: ACTIVE | Noted: 2021-07-20

## 2021-07-22 ENCOUNTER — HOSPITAL ENCOUNTER (OUTPATIENT)
Dept: INFUSION CENTER | Facility: CLINIC | Age: 71
Discharge: HOME | End: 2021-07-22
Payer: MEDICARE

## 2021-07-22 VITALS — TEMPERATURE: 97.7 F

## 2021-07-22 DIAGNOSIS — C54.1 ENDOMETRIAL CANCER (HCC): Primary | ICD-10-CM

## 2021-07-22 DIAGNOSIS — Z45.2 ENCOUNTER FOR CENTRAL LINE CARE: ICD-10-CM

## 2021-07-22 LAB
ALBUMIN SERPL BCP-MCNC: 3.4 G/DL (ref 3.5–5)
ALP SERPL-CCNC: 101 U/L (ref 46–116)
ALT SERPL W P-5'-P-CCNC: 43 U/L (ref 12–78)
ANION GAP SERPL CALCULATED.3IONS-SCNC: 10 MMOL/L (ref 4–13)
AST SERPL W P-5'-P-CCNC: 33 U/L (ref 5–45)
BASOPHILS # BLD AUTO: 0.04 THOUSANDS/ΜL (ref 0–0.1)
BASOPHILS NFR BLD AUTO: 0 % (ref 0–1)
BILIRUB SERPL-MCNC: 0.28 MG/DL (ref 0.2–1)
BUN SERPL-MCNC: 17 MG/DL (ref 5–25)
CALCIUM ALBUM COR SERPL-MCNC: 9.4 MG/DL (ref 8.3–10.1)
CALCIUM SERPL-MCNC: 8.9 MG/DL (ref 8.3–10.1)
CHLORIDE SERPL-SCNC: 98 MMOL/L (ref 100–108)
CO2 SERPL-SCNC: 27 MMOL/L (ref 21–32)
CREAT SERPL-MCNC: 0.66 MG/DL (ref 0.6–1.3)
EOSINOPHIL # BLD AUTO: 0.37 THOUSAND/ΜL (ref 0–0.61)
EOSINOPHIL NFR BLD AUTO: 4 % (ref 0–6)
ERYTHROCYTE [DISTWIDTH] IN BLOOD BY AUTOMATED COUNT: 13.4 % (ref 11.6–15.1)
GFR SERPL CREATININE-BSD FRML MDRD: 89 ML/MIN/1.73SQ M
GLUCOSE SERPL-MCNC: 91 MG/DL (ref 65–140)
HCT VFR BLD AUTO: 41.5 % (ref 34.8–46.1)
HGB BLD-MCNC: 13.3 G/DL (ref 11.5–15.4)
IMM GRANULOCYTES # BLD AUTO: 0.03 THOUSAND/UL (ref 0–0.2)
IMM GRANULOCYTES NFR BLD AUTO: 0 % (ref 0–2)
LYMPHOCYTES # BLD AUTO: 1.12 THOUSANDS/ΜL (ref 0.6–4.47)
LYMPHOCYTES NFR BLD AUTO: 12 % (ref 14–44)
MAGNESIUM SERPL-MCNC: 2.2 MG/DL (ref 1.6–2.6)
MCH RBC QN AUTO: 27.9 PG (ref 26.8–34.3)
MCHC RBC AUTO-ENTMCNC: 32 G/DL (ref 31.4–37.4)
MCV RBC AUTO: 87 FL (ref 82–98)
MONOCYTES # BLD AUTO: 0.32 THOUSAND/ΜL (ref 0.17–1.22)
MONOCYTES NFR BLD AUTO: 3 % (ref 4–12)
NEUTROPHILS # BLD AUTO: 7.83 THOUSANDS/ΜL (ref 1.85–7.62)
NEUTS SEG NFR BLD AUTO: 81 % (ref 43–75)
NRBC BLD AUTO-RTO: 0 /100 WBCS
PLATELET # BLD AUTO: 248 THOUSANDS/UL (ref 149–390)
PMV BLD AUTO: 10.8 FL (ref 8.9–12.7)
POTASSIUM SERPL-SCNC: 4.6 MMOL/L (ref 3.5–5.3)
PROT SERPL-MCNC: 7.6 G/DL (ref 6.4–8.2)
RBC # BLD AUTO: 4.77 MILLION/UL (ref 3.81–5.12)
SODIUM SERPL-SCNC: 135 MMOL/L (ref 136–145)
WBC # BLD AUTO: 9.71 THOUSAND/UL (ref 4.31–10.16)

## 2021-07-22 PROCEDURE — 83735 ASSAY OF MAGNESIUM: CPT

## 2021-07-22 PROCEDURE — 85025 COMPLETE CBC W/AUTO DIFF WBC: CPT

## 2021-07-22 PROCEDURE — 80053 COMPREHEN METABOLIC PANEL: CPT

## 2021-07-22 NOTE — PLAN OF CARE
Problem: Potential for Falls  Goal: Patient will remain free of falls  Description: INTERVENTIONS:  - Educate patient/family on patient safety including physical limitations  - Instruct patient to call for assistance with activity   -   Problem: Knowledge Deficit  Goal: Patient/family/caregiver demonstrates understanding of disease process, treatment plan, medications, and discharge instructions  Description: Complete learning assessment and assess knowledge base    Interventions:  - Provide teaching at level of understanding  - Provide teaching via preferred learning methods  Outcome: Progressing   Outcome: Progressing

## 2021-07-27 ENCOUNTER — TELEPHONE (OUTPATIENT)
Dept: HEMATOLOGY ONCOLOGY | Facility: CLINIC | Age: 71
End: 2021-07-27

## 2021-07-27 NOTE — TELEPHONE ENCOUNTER
Patient called to report leg pain from her last chemo treatment  Patient stated Tylenol is not helping long term  Patient is asking if she can alternate Tylenol with IbuProfen every 4 hours    Please call patient back at [phone]

## 2021-07-29 ENCOUNTER — HOSPITAL ENCOUNTER (OUTPATIENT)
Dept: INFUSION CENTER | Facility: CLINIC | Age: 71
Discharge: HOME | End: 2021-07-29
Payer: MEDICARE

## 2021-07-29 VITALS — TEMPERATURE: 97.1 F

## 2021-07-29 DIAGNOSIS — C54.1 ENDOMETRIAL CANCER (HCC): ICD-10-CM

## 2021-07-29 DIAGNOSIS — Z45.2 ENCOUNTER FOR CENTRAL LINE CARE: Primary | ICD-10-CM

## 2021-07-29 LAB
ALBUMIN SERPL BCP-MCNC: 2.9 G/DL (ref 3.5–5)
ALP SERPL-CCNC: 94 U/L (ref 46–116)
ALT SERPL W P-5'-P-CCNC: 36 U/L (ref 12–78)
ANION GAP SERPL CALCULATED.3IONS-SCNC: 8 MMOL/L (ref 4–13)
AST SERPL W P-5'-P-CCNC: 30 U/L (ref 5–45)
BASOPHILS # BLD AUTO: 0.06 THOUSANDS/ΜL (ref 0–0.1)
BASOPHILS NFR BLD AUTO: 1 % (ref 0–1)
BILIRUB SERPL-MCNC: 0.13 MG/DL (ref 0.2–1)
BUN SERPL-MCNC: 12 MG/DL (ref 5–25)
CALCIUM ALBUM COR SERPL-MCNC: 9.2 MG/DL (ref 8.3–10.1)
CALCIUM SERPL-MCNC: 8.3 MG/DL (ref 8.3–10.1)
CHLORIDE SERPL-SCNC: 101 MMOL/L (ref 100–108)
CO2 SERPL-SCNC: 27 MMOL/L (ref 21–32)
CREAT SERPL-MCNC: 0.53 MG/DL (ref 0.6–1.3)
EOSINOPHIL # BLD AUTO: 0.19 THOUSAND/ΜL (ref 0–0.61)
EOSINOPHIL NFR BLD AUTO: 4 % (ref 0–6)
ERYTHROCYTE [DISTWIDTH] IN BLOOD BY AUTOMATED COUNT: 13.3 % (ref 11.6–15.1)
GFR SERPL CREATININE-BSD FRML MDRD: 95 ML/MIN/1.73SQ M
GLUCOSE SERPL-MCNC: 86 MG/DL (ref 65–140)
HCT VFR BLD AUTO: 37.1 % (ref 34.8–46.1)
HGB BLD-MCNC: 12 G/DL (ref 11.5–15.4)
IMM GRANULOCYTES # BLD AUTO: 0.01 THOUSAND/UL (ref 0–0.2)
IMM GRANULOCYTES NFR BLD AUTO: 0 % (ref 0–2)
LYMPHOCYTES # BLD AUTO: 1.3 THOUSANDS/ΜL (ref 0.6–4.47)
LYMPHOCYTES NFR BLD AUTO: 25 % (ref 14–44)
MAGNESIUM SERPL-MCNC: 2.1 MG/DL (ref 1.6–2.6)
MCH RBC QN AUTO: 28.2 PG (ref 26.8–34.3)
MCHC RBC AUTO-ENTMCNC: 32.3 G/DL (ref 31.4–37.4)
MCV RBC AUTO: 87 FL (ref 82–98)
MONOCYTES # BLD AUTO: 0.51 THOUSAND/ΜL (ref 0.17–1.22)
MONOCYTES NFR BLD AUTO: 10 % (ref 4–12)
NEUTROPHILS # BLD AUTO: 3.22 THOUSANDS/ΜL (ref 1.85–7.62)
NEUTS SEG NFR BLD AUTO: 60 % (ref 43–75)
NRBC BLD AUTO-RTO: 0 /100 WBCS
PLATELET # BLD AUTO: 255 THOUSANDS/UL (ref 149–390)
PMV BLD AUTO: 10.6 FL (ref 8.9–12.7)
POTASSIUM SERPL-SCNC: 4.1 MMOL/L (ref 3.5–5.3)
PROT SERPL-MCNC: 6.9 G/DL (ref 6.4–8.2)
RBC # BLD AUTO: 4.25 MILLION/UL (ref 3.81–5.12)
SODIUM SERPL-SCNC: 136 MMOL/L (ref 136–145)
WBC # BLD AUTO: 5.29 THOUSAND/UL (ref 4.31–10.16)

## 2021-07-29 PROCEDURE — 85025 COMPLETE CBC W/AUTO DIFF WBC: CPT

## 2021-07-29 PROCEDURE — 80053 COMPREHEN METABOLIC PANEL: CPT

## 2021-07-29 PROCEDURE — 83735 ASSAY OF MAGNESIUM: CPT

## 2021-07-29 NOTE — PROGRESS NOTES
Pt presents for lab specimen collection via port a cath  Port accessed, labs drawn, saline locked and de accessed without difficulty  AVS printed, next appointment reviewed

## 2021-08-04 ENCOUNTER — OFFICE VISIT (OUTPATIENT)
Dept: GYNECOLOGIC ONCOLOGY | Facility: CLINIC | Age: 71
End: 2021-08-04
Payer: MEDICARE

## 2021-08-04 VITALS
DIASTOLIC BLOOD PRESSURE: 88 MMHG | BODY MASS INDEX: 20.66 KG/M2 | SYSTOLIC BLOOD PRESSURE: 134 MMHG | RESPIRATION RATE: 16 BRPM | TEMPERATURE: 97.3 F | HEART RATE: 87 BPM | WEIGHT: 124 LBS | HEIGHT: 65 IN

## 2021-08-04 DIAGNOSIS — C54.1 ENDOMETRIAL CANCER (HCC): Primary | ICD-10-CM

## 2021-08-04 PROCEDURE — 99024 POSTOP FOLLOW-UP VISIT: CPT | Performed by: OBSTETRICS & GYNECOLOGY

## 2021-08-04 NOTE — PROGRESS NOTES
Assessment/Plan:    Problem List Items Addressed This Visit        Genitourinary    Endometrial cancer [facility]) - Primary       Patient is very pleasant 71-year-old female with history of stage III C1 endometrial adenocarcinoma  She underwent robotic surgery and did well  Subsequently she underwent her 1st cycle of carboplatin area under the curve of 5+ paclitaxel 135 milligrams/meter squared  Within 3 days she had significant arthralgias myalgias which have been persistent  She has used nonsteroidal pain with minimal relief  She says that getting up in the morning can be excruciatingly painful  She does not wish to try narcotic pain medications  Additionally the patient notes initiation of neuropathy and the soles of the feet  The patient is interested in switching chemo regimens  At this point I have stated that her best course of action is to continue present treatment with dose reduction  I have recommended carboplatin area under the curve of 4+ paclitaxel at 110 milligrams/meter squared with a week's delay and initiation of treatment  The patient is amenable to that  In the interim we have recommended sending tumor off for genomic studies to see if the patient would be a good candidate for [facility] or whether is best to continue the present treatment plan  Additionally a CT scan of the  Abdomen and pelvis will be ordered as well as Doppler studies to rule out pelvic clot lower extremity clot or pelvic lymphocele           Relevant Orders    VAS lower limb venous duplex study, complete bilateral    CT abdomen pelvis wo contrast            CHIEF COMPLAINT:   Consideration of cycle 2 carboplatin paclitaxel for stage IIIC1 endometrial adenocarcinoma      Problem:  Cancer Staging  Endometrial cancer ([facility] [address] )  Staging form: Corpus Uteri - Carcinoma, AJCC 8th Edition  - Clinical: FIGO Stage I (cT1, cN0, cM0) - Signed by Carmelo Starr MD on 5/19/2021  - Pathologic: FIGO Stage IIIC1 (pT2, pN1, cM0) - Signed by Sherly Brian MD on 6/16/2021        Previous therapy:  Oncology History   Endometrial cancer ([facility] [address] )   5/15/2021 Initial Diagnosis    Endometrial cancer ([facility] [address] )     5/19/2021 -  Cancer Staged    Staging form: Corpus Uteri - Carcinoma, AJCC 8th Edition  - Clinical: FIGO Stage I (cT1, cN0, cM0) - Signed by Sherly Brian MD on 5/19/2021  Histologic grade (G): G3  Histologic grading system: 3 grade system       5/2021 Surgery      Robotically assisted total laparoscopic hysterectomy bilateral salpingo-oophorectomy and sentinel lymph node mapping and biopsy for stage III C1 grade 3 endometrial cancer with 17 of 18 mm invasion, cervical stromal involvement and bilateral positive nodes in the pelvis  Patient has also had small 2-5 mm pulmonary nodules which are too small to characterize as   Metastatic disease  The patient has a history of multiple pneumonias as a child       6/16/2021 -  Cancer Staged    Staging form: Corpus Uteri - Carcinoma, AJCC 8th Edition  - Pathologic: FIGO Stage IIIC1 (pT2, pN1, cM0) - Signed by Sherly Brian MD on 6/16/2021  Histologic grade (G): G3  Histologic grading system: 3 grade system  Residual tumor (R): R0 - None       7/19/2021 -  Chemotherapy    palonosetron (ALOXI), 0 25 mg, Intravenous, Once, 1 of 6 cycles  Administration: 0 25 mg (7/19/2021)  fosaprepitant (EMEND) IVPB, 150 mg, Intravenous, Once, 1 of 6 cycles  Administration: 150 mg (7/19/2021)  CARBOplatin (PARAPLATIN) IVPB (GOG AUC DOSING), 436 mg, Intravenous, Once, 1 of 6 cycles  Administration: 436 mg (7/19/2021)  PACLItaxel (TAXOL) chemo IVPB, 135 mg/m2 = 220 2 mg (77 1 % of original dose 175 mg/m2), Intravenous, Once, 1 of 6 cycles  Dose modification: 135 mg/m2 (original dose 175 mg/m2, Cycle 1, Reason: Dose modified as per discussion with consulting physician), 110 mg/m2 (original dose 175 mg/m2, Cycle 2, Reason: Dose Not Tolerated, Comment: Neuropathy and pain)  Administration: 220 2 mg (7/19/2021) Patient ID: Yuli Arreguin is a 66 y o  female   Patient is very pleasant 74-year-old female with history of stage IIIC1 endometrial adenocarcinoma  She underwent is robot hysterectomy BSO and sentinel lymph nodes followed by a single cycle of carboplatin paclitaxel therapy  She presents today for consideration of cycle 2  I have reviewed the patient's weekly CBC and CPM P data  Hematologic and biochemistry data indicates no significant change  The patient's blood counts support present treatment and present dosing  Overall the patient  Had significant difficulty tolerating the chemotherapy  Patient has developed diffuse arthralgias myalgias and lower extremity swelling  These are predominantly of the hip and lower pelvis  She has taken to using a pair of crutches to walk  She noticed this is worse in the morning  She has had nausea but not required nausea medications  She does find that when she eats a small amount that she does feel better  She is noticing some scalp tenderness indicating likely soon loss of hair  The patient is interested in discontinuing this chemotherapy and using Keytruda as an alternative  She would like to delay the start of the next chemotherapy due to persistent pain  The following portions of the patient's history were reviewed and updated as appropriate: allergies, current medications, past family history, past medical history, past surgical history and problem list     Review of Systems   Constitutional: Negative  HENT: Negative  Eyes: Negative  Respiratory: Negative  Cardiovascular: Negative  Gastrointestinal: Negative  Endocrine: Negative  Genitourinary: Negative  Musculoskeletal: Positive for arthralgias, gait problem, joint swelling and myalgias  Skin: Negative  Neurological: Positive for weakness  Painful neuropathy in the soles of the feet   Hematological: Negative  Psychiatric/Behavioral: Negative  Current Outpatient Medications   Medication Sig Dispense Refill    acetaminophen (TYLENOL) 500 mg tablet Take 500 mg by mouth every 6 (six) hours as needed for mild pain      Ascorbic Acid (vitamin C) 1000 MG tablet Take 1,000 mg by mouth daily      azelastine (OPTIVAR) 0 05 % ophthalmic solution Administer 1 drop to both eyes daily as needed      Calcium Carb-Cholecalciferol (CALCIUM 1000 + D PO) Take 2,000 Units by mouth        Cholecalciferol (Vitamin D3) 25 MCG (1000 UT) CAPS Take 1 capsule by mouth 2 (two) times a day      Cranberry 1000 MG CAPS Take by mouth      diphenhydrAMINE HCl (BENADRYL ALLERGY PO) Take by mouth      LORazepam (ATIVAN) 1 mg tablet Take 1 tablet (1 mg total) by mouth every 6 (six) hours as needed for anxiety (or nausea) 36 tablet 1    LUTEIN PO Take 1 tablet by mouth daily      methylPREDNISolone 4 MG tablet therapy pack Use as directed on package 21 tablet 0    metroNIDAZOLE (METROCREAM) 0 75 % cream Apply topically 2 (two) times a day      naloxone (NARCAN) 4 mg/0 1 mL nasal spray Administer 1 spray into a nostril  If no response after 2-3 minutes, give another dose in the other nostril using a new spray  1 each 1    ondansetron (ZOFRAN) 8 mg tablet Take 1 tablet (8 mg total) by mouth every 8 (eight) hours as needed for nausea or vomiting 30 tablet 1    oxyCODONE (ROXICODONE) 5 mg immediate release tablet 1 -2 tabs by mouth every 4-6 hour as needed 10 tablet 0    Probiotic Product (CULTURELLE PROBIOTICS PO) Take by mouth      vitamin E, tocopherol, 1,000 units capsule Take 1,000 Units by mouth daily      VITAMIN K PO Take 50 mcg by mouth daily       Vitamins A & D (VITAMIN A & D PO) Take by mouth       No current facility-administered medications for this visit  Objective:    Blood pressure 134/88, pulse 87, temperature (!) 97 3 °F (36 3 °C), resp  rate 16, height 5' 4 57" (1 64 m), weight 56 2 kg (124 lb)  Body mass index is 20 91 kg/m²    Body surface area is 1 61 meters squared      Physical Exam    Lab Results   Component Value Date     71 0 (H) 07/12/2021     Lab Results   Component Value Date    K 4 1 07/29/2021     07/29/2021    CO2 27 07/29/2021    BUN 12 07/29/2021    CREATININE 0 53 (L) 07/29/2021    GLUF 98 05/21/2021    CALCIUM 8 3 07/29/2021    CORRECTEDCA 9 2 07/29/2021    AST 30 07/29/2021    ALT 36 07/29/2021    ALKPHOS 94 07/29/2021    EGFR 95 07/29/2021     Lab Results   Component Value Date    WBC 5 29 07/29/2021    HGB 12 0 07/29/2021    HCT 37 1 07/29/2021    MCV 87 07/29/2021     07/29/2021     Lab Results   Component Value Date    NEUTROABS 3 22 07/29/2021        Trend:  Lab Results   Component Value Date     71 0 (H) 07/12/2021

## 2021-08-04 NOTE — ASSESSMENT & PLAN NOTE
Patient is very pleasant 76-year-old female with history of stage III C1 endometrial adenocarcinoma  She underwent robotic surgery and did well  Subsequently she underwent her 1st cycle of carboplatin area under the curve of 5+ paclitaxel 135 milligrams/meter squared  Within 3 days she had significant arthralgias myalgias which have been persistent  She has used nonsteroidal pain with minimal relief  She says that getting up in the morning can be excruciatingly painful  She does not wish to try narcotic pain medications  Additionally the patient notes initiation of neuropathy and the soles of the feet  The patient is interested in switching chemo regimens  At this point I have stated that her best course of action is to continue present treatment with dose reduction  I have recommended carboplatin area under the curve of 4+ paclitaxel at 110 milligrams/meter squared with a week's delay and initiation of treatment  The patient is amenable to that  In the interim we have recommended sending tumor off for genomic studies to see if the patient would be a good candidate for [facility] or whether is best to continue the present treatment plan  Additionally a CT scan of the  Abdomen and pelvis will be ordered as well as Doppler studies to rule out pelvic clot lower extremity clot or pelvic lymphocele

## 2021-08-04 NOTE — LETTER
August 4, 2021     [facility] [id number]    Patient: Kianna Cox   YOB: 1950   Date of Visit: 8/4/2021       Dear Dr Nevaeh Figueroa: Thank you for referring Kianna Cox to me for evaluation  Below are my notes for this consultation  If you have questions, please do not hesitate to call me  I look forward to following your patient along with you  Sincerely,        Angelica Murphy MD        CC: No Recipients  Angelica Murphy MD  8/4/2021 10:22 AM  Sign when Signing Visit  Assessment/Plan:    Problem List Items Addressed This Visit        Genitourinary    Endometrial cancer [facility]) - Primary       Patient is very pleasant 59-year-old female with history of stage III C1 endometrial adenocarcinoma  She underwent robotic surgery and did well  Subsequently she underwent her 1st cycle of carboplatin area under the curve of 5+ paclitaxel 135 milligrams/meter squared  Within 3 days she had significant arthralgias myalgias which have been persistent  She has used nonsteroidal pain with minimal relief  She says that getting up in the morning can be excruciatingly painful  She does not wish to try narcotic pain medications  Additionally the patient notes initiation of neuropathy and the soles of the feet  The patient is interested in switching chemo regimens  At this point I have stated that her best course of action is to continue present treatment with dose reduction  I have recommended carboplatin area under the curve of 4+ paclitaxel at 110 milligrams/meter squared with a week's delay and initiation of treatment  The patient is amenable to that  In the interim we have recommended sending tumor off for genomic studies to see if the patient would be a good candidate for [facility] or whether is best to continue the present treatment plan        Additionally a CT scan of the  Abdomen and pelvis will be ordered as well as Doppler studies to rule out pelvic clot lower extremity clot or pelvic lymphocele  Relevant Orders    VAS lower limb venous duplex study, complete bilateral    CT abdomen pelvis wo contrast            CHIEF COMPLAINT:   Consideration of cycle 2 carboplatin paclitaxel for stage IIIC1 endometrial adenocarcinoma      Problem:  Cancer Staging  Endometrial cancer ([facility] [address] )  Staging form: Corpus Uteri - Carcinoma, AJCC 8th Edition  - Clinical: FIGO Stage I (cT1, cN0, cM0) - Signed by Heber Harrison MD on 5/19/2021  - Pathologic: FIGO Stage IIIC1 (pT2, pN1, cM0) - Signed by Heber Harrison MD on 6/16/2021        Previous therapy:  Oncology History   Endometrial cancer ([facility] [address] )   5/15/2021 Initial Diagnosis    [facility]     5/19/2021 -  Cancer Staged    Staging form: Corpus Uteri - Carcinoma, AJCC 8th Edition  - Clinical: FIGO Stage I (cT1, cN0, cM0) - Signed by Heber Harrison MD on 5/19/2021  Histologic grade (G): G3  Histologic grading system: 3 grade system       5/2021 Surgery      Robotically assisted total laparoscopic hysterectomy bilateral salpingo-oophorectomy and sentinel lymph node mapping and biopsy for stage III C1 grade 3 endometrial cancer with 17 of 18 mm invasion, cervical stromal involvement and bilateral positive nodes in the pelvis  Patient has also had small 2-5 mm pulmonary nodules which are too small to characterize as   Metastatic disease  The patient has a history of multiple pneumonias as a child       6/16/2021 -  Cancer Staged    Staging form: Corpus Uteri - Carcinoma, AJCC 8th Edition  - Pathologic: FIGO Stage IIIC1 (pT2, pN1, cM0) - Signed by Heber Harrison MD on 6/16/2021  Histologic grade (G): G3  Histologic grading system: 3 grade system  Residual tumor (R): R0 - None       7/19/2021 -  Chemotherapy    palonosetron (ALOXI), 0 25 mg, Intravenous, Once, 1 of 6 cycles  Administration: 0 25 mg (7/19/2021)  fosaprepitant (EMEND) IVPB, 150 mg, Intravenous, Once, 1 of 6 cycles  Administration: 150 mg (7/19/2021)  CARBOplatin (PARAPLATIN) IVPB (GOG AUC DOSING), 436 mg, Intravenous, Once, 1 of 6 cycles  Administration: 436 mg (7/19/2021)  PACLItaxel (TAXOL) chemo IVPB, 135 mg/m2 = 220 2 mg (77 1 % of original dose 175 mg/m2), Intravenous, Once, 1 of 6 cycles  Dose modification: 135 mg/m2 (original dose 175 mg/m2, Cycle 1, Reason: Dose modified as per discussion with consulting physician), 110 mg/m2 (original dose 175 mg/m2, Cycle 2, Reason: Dose Not Tolerated, Comment: Neuropathy and pain)  Administration: 220 2 mg (7/19/2021)           Patient ID: Eris Kelly is a 66 y o  female   Patient is very pleasant 76-year-old female with history of stage IIIC1 endometrial adenocarcinoma  She underwent is robot hysterectomy BSO and sentinel lymph nodes followed by a single cycle of carboplatin paclitaxel therapy  She presents today for consideration of cycle 2  I have reviewed the patient's weekly CBC and CPM P data  Hematologic and biochemistry data indicates no significant change  The patient's blood counts support present treatment and present dosing  Overall the patient  Had significant difficulty tolerating the chemotherapy  Patient has developed diffuse arthralgias myalgias and lower extremity swelling  These are predominantly of the hip and lower pelvis  She has taken to using a pair of crutches to walk  She noticed this is worse in the morning  She has had nausea but not required nausea medications  She does find that when she eats a small amount that she does feel better  She is noticing some scalp tenderness indicating likely soon loss of hair  The patient is interested in discontinuing this chemotherapy and using Keytruda as an alternative  She would like to delay the start of the next chemotherapy due to persistent pain        The following portions of the patient's history were reviewed and updated as appropriate: allergies, current medications, past family history, past medical history, past surgical history and problem list     Review of Systems   Constitutional: Negative  HENT: Negative  Eyes: Negative  Respiratory: Negative  Cardiovascular: Negative  Gastrointestinal: Negative  Endocrine: Negative  Genitourinary: Negative  Musculoskeletal: Positive for arthralgias, gait problem, joint swelling and myalgias  Skin: Negative  Neurological: Positive for weakness  Painful neuropathy in the soles of the feet   Hematological: Negative  Psychiatric/Behavioral: Negative  Current Outpatient Medications   Medication Sig Dispense Refill    acetaminophen (TYLENOL) 500 mg tablet Take 500 mg by mouth every 6 (six) hours as needed for mild pain      Ascorbic Acid (vitamin C) 1000 MG tablet Take 1,000 mg by mouth daily      azelastine (OPTIVAR) 0 05 % ophthalmic solution Administer 1 drop to both eyes daily as needed      Calcium Carb-Cholecalciferol (CALCIUM 1000 + D PO) Take 2,000 Units by mouth        Cholecalciferol (Vitamin D3) 25 MCG (1000 UT) CAPS Take 1 capsule by mouth 2 (two) times a day      Cranberry 1000 MG CAPS Take by mouth      diphenhydrAMINE HCl (BENADRYL ALLERGY PO) Take by mouth      LORazepam (ATIVAN) 1 mg tablet Take 1 tablet (1 mg total) by mouth every 6 (six) hours as needed for anxiety (or nausea) 36 tablet 1    LUTEIN PO Take 1 tablet by mouth daily      methylPREDNISolone 4 MG tablet therapy pack Use as directed on package 21 tablet 0    metroNIDAZOLE (METROCREAM) 0 75 % cream Apply topically 2 (two) times a day      naloxone (NARCAN) 4 mg/0 1 mL nasal spray Administer 1 spray into a nostril  If no response after 2-3 minutes, give another dose in the other nostril using a new spray   1 each 1    ondansetron (ZOFRAN) 8 mg tablet Take 1 tablet (8 mg total) by mouth every 8 (eight) hours as needed for nausea or vomiting 30 tablet 1    oxyCODONE (ROXICODONE) 5 mg immediate release tablet 1 -2 tabs by mouth every 4-6 hour as needed 10 tablet 0    Probiotic Product (CULTURELLE PROBIOTICS PO) Take by mouth      vitamin E, tocopherol, 1,000 units capsule Take 1,000 Units by mouth daily      VITAMIN K PO Take 50 mcg by mouth daily       Vitamins A & D (VITAMIN A & D PO) Take by mouth       No current facility-administered medications for this visit  Objective:    Blood pressure 134/88, pulse 87, temperature (!) 97 3 °F (36 3 °C), resp  rate 16, height 5' 4 57" (1 64 m), weight 56 2 kg (124 lb)  Body mass index is 20 91 kg/m²  Body surface area is 1 61 meters squared      Physical Exam    Lab Results   Component Value Date     71 0 (H) 07/12/2021     Lab Results   Component Value Date    K 4 1 07/29/2021     07/29/2021    CO2 27 07/29/2021    BUN 12 07/29/2021    CREATININE 0 53 (L) 07/29/2021    GLUF 98 05/21/2021    CALCIUM 8 3 07/29/2021    CORRECTEDCA 9 2 07/29/2021    AST 30 07/29/2021    ALT 36 07/29/2021    ALKPHOS 94 07/29/2021    EGFR 95 07/29/2021     Lab Results   Component Value Date    WBC 5 29 07/29/2021    HGB 12 0 07/29/2021    HCT 37 1 07/29/2021    MCV 87 07/29/2021     07/29/2021     Lab Results   Component Value Date    NEUTROABS 3 22 07/29/2021        Trend:  Lab Results   Component Value Date     71 0 (H) 07/12/2021

## 2021-08-05 ENCOUNTER — HOSPITAL ENCOUNTER (OUTPATIENT)
Dept: INFUSION CENTER | Facility: CLINIC | Age: 71
Discharge: HOME | End: 2021-08-05
Payer: MEDICARE

## 2021-08-05 DIAGNOSIS — C54.1 ENDOMETRIAL CANCER (HCC): Primary | ICD-10-CM

## 2021-08-05 DIAGNOSIS — Z45.2 ENCOUNTER FOR CENTRAL LINE CARE: ICD-10-CM

## 2021-08-05 LAB
ALBUMIN SERPL BCP-MCNC: 2.9 G/DL (ref 3.5–5)
ALP SERPL-CCNC: 97 U/L (ref 46–116)
ALT SERPL W P-5'-P-CCNC: 29 U/L (ref 12–78)
ANION GAP SERPL CALCULATED.3IONS-SCNC: 8 MMOL/L (ref 4–13)
AST SERPL W P-5'-P-CCNC: 27 U/L (ref 5–45)
BASOPHILS # BLD AUTO: 0.07 THOUSANDS/ΜL (ref 0–0.1)
BASOPHILS NFR BLD AUTO: 1 % (ref 0–1)
BILIRUB SERPL-MCNC: 0.14 MG/DL (ref 0.2–1)
BUN SERPL-MCNC: 13 MG/DL (ref 5–25)
CALCIUM ALBUM COR SERPL-MCNC: 9.3 MG/DL (ref 8.3–10.1)
CALCIUM SERPL-MCNC: 8.4 MG/DL (ref 8.3–10.1)
CANCER AG125 SERPL-ACNC: 63.6 U/ML (ref 0–30)
CHLORIDE SERPL-SCNC: 102 MMOL/L (ref 100–108)
CO2 SERPL-SCNC: 28 MMOL/L (ref 21–32)
CREAT SERPL-MCNC: 0.77 MG/DL (ref 0.6–1.3)
EOSINOPHIL # BLD AUTO: 0.21 THOUSAND/ΜL (ref 0–0.61)
EOSINOPHIL NFR BLD AUTO: 4 % (ref 0–6)
ERYTHROCYTE [DISTWIDTH] IN BLOOD BY AUTOMATED COUNT: 13.2 % (ref 11.6–15.1)
GFR SERPL CREATININE-BSD FRML MDRD: 77 ML/MIN/1.73SQ M
GLUCOSE SERPL-MCNC: 93 MG/DL (ref 65–140)
HCT VFR BLD AUTO: 36.5 % (ref 34.8–46.1)
HGB BLD-MCNC: 11.9 G/DL (ref 11.5–15.4)
IMM GRANULOCYTES # BLD AUTO: 0.01 THOUSAND/UL (ref 0–0.2)
IMM GRANULOCYTES NFR BLD AUTO: 0 % (ref 0–2)
LYMPHOCYTES # BLD AUTO: 1.73 THOUSANDS/ΜL (ref 0.6–4.47)
LYMPHOCYTES NFR BLD AUTO: 30 % (ref 14–44)
MAGNESIUM SERPL-MCNC: 2.1 MG/DL (ref 1.6–2.6)
MCH RBC QN AUTO: 28.1 PG (ref 26.8–34.3)
MCHC RBC AUTO-ENTMCNC: 32.6 G/DL (ref 31.4–37.4)
MCV RBC AUTO: 86 FL (ref 82–98)
MONOCYTES # BLD AUTO: 0.86 THOUSAND/ΜL (ref 0.17–1.22)
MONOCYTES NFR BLD AUTO: 15 % (ref 4–12)
NEUTROPHILS # BLD AUTO: 2.99 THOUSANDS/ΜL (ref 1.85–7.62)
NEUTS SEG NFR BLD AUTO: 50 % (ref 43–75)
NRBC BLD AUTO-RTO: 0 /100 WBCS
PLATELET # BLD AUTO: 235 THOUSANDS/UL (ref 149–390)
PMV BLD AUTO: 9.4 FL (ref 8.9–12.7)
POTASSIUM SERPL-SCNC: 4.3 MMOL/L (ref 3.5–5.3)
PROT SERPL-MCNC: 6.8 G/DL (ref 6.4–8.2)
RBC # BLD AUTO: 4.24 MILLION/UL (ref 3.81–5.12)
SODIUM SERPL-SCNC: 138 MMOL/L (ref 136–145)
WBC # BLD AUTO: 5.87 THOUSAND/UL (ref 4.31–10.16)

## 2021-08-05 PROCEDURE — 83735 ASSAY OF MAGNESIUM: CPT

## 2021-08-05 PROCEDURE — 86304 IMMUNOASSAY TUMOR CA 125: CPT

## 2021-08-05 PROCEDURE — 36593 DECLOT VASCULAR DEVICE: CPT

## 2021-08-05 PROCEDURE — 80053 COMPREHEN METABOLIC PANEL: CPT

## 2021-08-05 PROCEDURE — 85025 COMPLETE CBC W/AUTO DIFF WBC: CPT

## 2021-08-05 RX ADMIN — ALTEPLASE 2 MG: 2.2 INJECTION, POWDER, LYOPHILIZED, FOR SOLUTION INTRAVENOUS at 14:47

## 2021-08-05 NOTE — PROGRESS NOTES
Pt to clinic for port flush and lab draw via port, offers no complaints today, port not giving blood return, TPA placed, after 30 minutes of dwell time blood return noted from port, pt's labs collected via port, pt tolerated procedure without complications, aware of next appointment, port de-accessed, avs printed and reviewed

## 2021-08-06 ENCOUNTER — HOSPITAL ENCOUNTER (OUTPATIENT)
Dept: NON INVASIVE DIAGNOSTICS | Facility: CLINIC | Age: 71
Discharge: HOME | End: 2021-08-06
Payer: MEDICARE

## 2021-08-06 DIAGNOSIS — C54.1 ENDOMETRIAL CANCER (HCC): ICD-10-CM

## 2021-08-06 PROCEDURE — 93970 EXTREMITY STUDY: CPT

## 2021-08-10 ENCOUNTER — APPOINTMENT (OUTPATIENT)
Dept: LAB | Facility: HOSPITAL | Age: 71
End: 2021-08-10
Payer: MEDICARE

## 2021-08-10 ENCOUNTER — HOSPITAL ENCOUNTER (OUTPATIENT)
Dept: CT IMAGING | Facility: CLINIC | Age: 71
Discharge: HOME | End: 2021-08-10
Payer: MEDICARE

## 2021-08-10 ENCOUNTER — TELEPHONE (OUTPATIENT)
Dept: SURGICAL ONCOLOGY | Facility: CLINIC | Age: 71
End: 2021-08-10

## 2021-08-10 DIAGNOSIS — C54.1 ENDOMETRIAL CANCER (HCC): ICD-10-CM

## 2021-08-10 DIAGNOSIS — R30.0 DYSURIA: ICD-10-CM

## 2021-08-10 DIAGNOSIS — R30.0 DYSURIA: Primary | ICD-10-CM

## 2021-08-10 PROCEDURE — G1004 CDSM NDSC: HCPCS

## 2021-08-10 PROCEDURE — 74176 CT ABD & PELVIS W/O CONTRAST: CPT

## 2021-08-10 PROCEDURE — 87086 URINE CULTURE/COLONY COUNT: CPT

## 2021-08-10 RX ORDER — NITROFURANTOIN 25; 75 MG/1; MG/1
100 CAPSULE ORAL 2 TIMES DAILY
Qty: 10 CAPSULE | Refills: 0 | Status: SHIPPED | OUTPATIENT
Start: 2021-08-10 | End: 2021-09-05 | Stop reason: HOSPADM

## 2021-08-10 NOTE — TELEPHONE ENCOUNTER
Return call placed to patient  Patient notes urinary tract infection, not relieved with OTC medication  Will plan for abx and urine culture

## 2021-08-10 NOTE — TELEPHONE ENCOUNTER
Katlyn would like a call, she called asked to speak with Lady Desouza, notified patient Naomi Ledezma is covering and can give her a call

## 2021-08-11 LAB — BACTERIA UR CULT: NORMAL

## 2021-08-11 PROCEDURE — 93970 EXTREMITY STUDY: CPT | Performed by: SURGERY

## 2021-08-12 ENCOUNTER — HOSPITAL ENCOUNTER (OUTPATIENT)
Dept: INFUSION CENTER | Facility: CLINIC | Age: 71
Discharge: HOME | End: 2021-08-12
Payer: MEDICARE

## 2021-08-12 VITALS — TEMPERATURE: 97.4 F

## 2021-08-12 DIAGNOSIS — C54.1 ENDOMETRIAL CANCER (HCC): Primary | ICD-10-CM

## 2021-08-12 DIAGNOSIS — Z45.2 ENCOUNTER FOR CENTRAL LINE CARE: ICD-10-CM

## 2021-08-12 LAB
ALBUMIN SERPL BCP-MCNC: 3.1 G/DL (ref 3.5–5)
ALP SERPL-CCNC: 106 U/L (ref 46–116)
ALT SERPL W P-5'-P-CCNC: 29 U/L (ref 12–78)
ANION GAP SERPL CALCULATED.3IONS-SCNC: 9 MMOL/L (ref 4–13)
AST SERPL W P-5'-P-CCNC: 26 U/L (ref 5–45)
BASOPHILS # BLD AUTO: 0.06 THOUSANDS/ΜL (ref 0–0.1)
BASOPHILS NFR BLD AUTO: 1 % (ref 0–1)
BILIRUB SERPL-MCNC: 0.24 MG/DL (ref 0.2–1)
BUN SERPL-MCNC: 18 MG/DL (ref 5–25)
CALCIUM ALBUM COR SERPL-MCNC: 9.4 MG/DL (ref 8.3–10.1)
CALCIUM SERPL-MCNC: 8.7 MG/DL (ref 8.3–10.1)
CHLORIDE SERPL-SCNC: 100 MMOL/L (ref 100–108)
CO2 SERPL-SCNC: 27 MMOL/L (ref 21–32)
CREAT SERPL-MCNC: 1 MG/DL (ref 0.6–1.3)
EOSINOPHIL # BLD AUTO: 0.24 THOUSAND/ΜL (ref 0–0.61)
EOSINOPHIL NFR BLD AUTO: 3 % (ref 0–6)
ERYTHROCYTE [DISTWIDTH] IN BLOOD BY AUTOMATED COUNT: 13 % (ref 11.6–15.1)
GFR SERPL CREATININE-BSD FRML MDRD: 56 ML/MIN/1.73SQ M
GLUCOSE SERPL-MCNC: 87 MG/DL (ref 65–140)
HCT VFR BLD AUTO: 38.1 % (ref 34.8–46.1)
HGB BLD-MCNC: 12.3 G/DL (ref 11.5–15.4)
IMM GRANULOCYTES # BLD AUTO: 0.03 THOUSAND/UL (ref 0–0.2)
IMM GRANULOCYTES NFR BLD AUTO: 0 % (ref 0–2)
LYMPHOCYTES # BLD AUTO: 1.49 THOUSANDS/ΜL (ref 0.6–4.47)
LYMPHOCYTES NFR BLD AUTO: 16 % (ref 14–44)
MAGNESIUM SERPL-MCNC: 2.2 MG/DL (ref 1.6–2.6)
MCH RBC QN AUTO: 27.5 PG (ref 26.8–34.3)
MCHC RBC AUTO-ENTMCNC: 32.3 G/DL (ref 31.4–37.4)
MCV RBC AUTO: 85 FL (ref 82–98)
MONOCYTES # BLD AUTO: 0.8 THOUSAND/ΜL (ref 0.17–1.22)
MONOCYTES NFR BLD AUTO: 9 % (ref 4–12)
NEUTROPHILS # BLD AUTO: 6.44 THOUSANDS/ΜL (ref 1.85–7.62)
NEUTS SEG NFR BLD AUTO: 71 % (ref 43–75)
NRBC BLD AUTO-RTO: 0 /100 WBCS
PLATELET # BLD AUTO: 158 THOUSANDS/UL (ref 149–390)
PMV BLD AUTO: 9.6 FL (ref 8.9–12.7)
POTASSIUM SERPL-SCNC: 4.5 MMOL/L (ref 3.5–5.3)
PROT SERPL-MCNC: 7 G/DL (ref 6.4–8.2)
RBC # BLD AUTO: 4.47 MILLION/UL (ref 3.81–5.12)
SODIUM SERPL-SCNC: 136 MMOL/L (ref 136–145)
WBC # BLD AUTO: 9.06 THOUSAND/UL (ref 4.31–10.16)

## 2021-08-12 PROCEDURE — 83735 ASSAY OF MAGNESIUM: CPT

## 2021-08-12 PROCEDURE — 80053 COMPREHEN METABOLIC PANEL: CPT

## 2021-08-12 PROCEDURE — 85025 COMPLETE CBC W/AUTO DIFF WBC: CPT

## 2021-08-12 NOTE — PROGRESS NOTES
Pt to clinic for port flush and lab draw via port, offers no complaints, lab labels no printing, per lab personnel okay to draw labs and send them with pt labels, tolerated procedure without complications, pt aware of next appointment, port de-accessed, avs printed and reviewed

## 2021-08-16 ENCOUNTER — HOSPITAL ENCOUNTER (OUTPATIENT)
Dept: INFUSION CENTER | Facility: CLINIC | Age: 71
Discharge: HOME | End: 2021-08-16
Payer: MEDICARE

## 2021-08-16 ENCOUNTER — PATIENT OUTREACH (OUTPATIENT)
Dept: CASE MANAGEMENT | Facility: HOSPITAL | Age: 71
End: 2021-08-16

## 2021-08-16 VITALS
TEMPERATURE: 96.9 F | BODY MASS INDEX: 20.57 KG/M2 | WEIGHT: 123.46 LBS | SYSTOLIC BLOOD PRESSURE: 157 MMHG | DIASTOLIC BLOOD PRESSURE: 69 MMHG | RESPIRATION RATE: 18 BRPM | HEIGHT: 65 IN | HEART RATE: 87 BPM

## 2021-08-16 DIAGNOSIS — C54.1 ENDOMETRIAL CANCER (HCC): Primary | ICD-10-CM

## 2021-08-16 PROCEDURE — 96375 TX/PRO/DX INJ NEW DRUG ADDON: CPT

## 2021-08-16 PROCEDURE — 96417 CHEMO IV INFUS EACH ADDL SEQ: CPT

## 2021-08-16 PROCEDURE — 96415 CHEMO IV INFUSION ADDL HR: CPT

## 2021-08-16 PROCEDURE — 96367 TX/PROPH/DG ADDL SEQ IV INF: CPT

## 2021-08-16 PROCEDURE — 96413 CHEMO IV INFUSION 1 HR: CPT

## 2021-08-16 RX ORDER — PALONOSETRON 0.05 MG/ML
0.25 INJECTION, SOLUTION INTRAVENOUS ONCE
Status: COMPLETED | OUTPATIENT
Start: 2021-08-16 | End: 2021-08-16

## 2021-08-16 RX ORDER — SODIUM CHLORIDE 9 MG/ML
20 INJECTION, SOLUTION INTRAVENOUS ONCE
Status: COMPLETED | OUTPATIENT
Start: 2021-08-16 | End: 2021-08-16

## 2021-08-16 RX ADMIN — PALONOSETRON 0.25 MG: 0.05 INJECTION, SOLUTION INTRAVENOUS at 10:33

## 2021-08-16 RX ADMIN — DIPHENHYDRAMINE HYDROCHLORIDE 25 MG: 50 INJECTION, SOLUTION INTRAMUSCULAR; INTRAVENOUS at 08:57

## 2021-08-16 RX ADMIN — FOSAPREPITANT 150 MG: 150 INJECTION, POWDER, LYOPHILIZED, FOR SOLUTION INTRAVENOUS at 09:54

## 2021-08-16 RX ADMIN — DEXAMETHASONE SODIUM PHOSPHATE 20 MG: 10 INJECTION, SOLUTION INTRAMUSCULAR; INTRAVENOUS at 08:35

## 2021-08-16 RX ADMIN — PACLITAXEL 177 MG: 6 INJECTION, SOLUTION, CONCENTRATE INTRAVENOUS at 10:36

## 2021-08-16 RX ADMIN — FAMOTIDINE 20 MG: 10 INJECTION, SOLUTION INTRAVENOUS at 09:26

## 2021-08-16 RX ADMIN — CARBOPLATIN 280.4 MG: 10 INJECTION, SOLUTION INTRAVENOUS at 13:49

## 2021-08-16 RX ADMIN — SODIUM CHLORIDE 20 ML/HR: 0.9 INJECTION, SOLUTION INTRAVENOUS at 08:33

## 2021-08-16 NOTE — PROGRESS NOTES
Pt to clinic for taxol and carboplatin, offers no complaints today, tolerated infusion without complications, aware of next appointment, port de-accessed, avs printed and reviewed

## 2021-08-16 NOTE — PROGRESS NOTES
EFREN met with pt in the [facility] center today, she is here for her second treatment but this is the first time that we are meeting  She tells me that she had a hard time with her first treatment side effects, her main complaint is weakness in her legs and feeling like "they were trapped in cement"  She was using hand crutches to help get around and says that the sensation did not go away even after delaying her second treatment by a week  Pt says that she has a lot of support in her life  Her  works as a family therapist but has been taking some time off to help her as needed, and she has adult children and one grandson who was just recently 2  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, and spoke about how her cancer has impacted her time with him and how sad it makes her  He will also be starting  at the end of this month and she is hopeful that she will still have a good amount of time with him  Pt was emotional during our conversation and shared that she is frustrated with not being able to do as much as she could previously  She talked about how her chemo affected her whole body and it was frustrating her to not be able to keep up with the house or play with her grandson like had been able to before  She shared her feelings of loneliness and isolation, mostly due to her treatments and needing to rest, but also in part due to the pandemic  She shared that she had broken her leg skiing a few years ago, and was expecting that she would have to adapt and take it a little easier at home, but she wasn't expecting the side effects and fatigue that her first treatment gave her  Pt denies any specific needs at this time, but shared that she was appreciative to have someone to talk to  We agreed to continue to touch base as she is going through her treatments so that I can provide her emotional support    She was very thankful for the time spent talking today, I will continue to check in with her and assist as needed moving forward

## 2021-08-19 ENCOUNTER — HOSPITAL ENCOUNTER (OUTPATIENT)
Dept: INFUSION CENTER | Facility: CLINIC | Age: 71
Discharge: HOME | End: 2021-08-19
Payer: MEDICARE

## 2021-08-19 VITALS — TEMPERATURE: 97.2 F

## 2021-08-19 DIAGNOSIS — Z45.2 ENCOUNTER FOR CENTRAL LINE CARE: ICD-10-CM

## 2021-08-19 DIAGNOSIS — C54.1 ENDOMETRIAL CANCER (HCC): Primary | ICD-10-CM

## 2021-08-19 LAB
ALBUMIN SERPL BCP-MCNC: 2.9 G/DL (ref 3.5–5)
ALP SERPL-CCNC: 114 U/L (ref 46–116)
ALT SERPL W P-5'-P-CCNC: 29 U/L (ref 12–78)
ANION GAP SERPL CALCULATED.3IONS-SCNC: 9 MMOL/L (ref 4–13)
AST SERPL W P-5'-P-CCNC: 37 U/L (ref 5–45)
BASOPHILS # BLD AUTO: 0.04 THOUSANDS/ΜL (ref 0–0.1)
BASOPHILS NFR BLD AUTO: 1 % (ref 0–1)
BILIRUB SERPL-MCNC: 0.33 MG/DL (ref 0.2–1)
BUN SERPL-MCNC: 23 MG/DL (ref 5–25)
CALCIUM ALBUM COR SERPL-MCNC: 9.3 MG/DL (ref 8.3–10.1)
CALCIUM SERPL-MCNC: 8.4 MG/DL (ref 8.3–10.1)
CHLORIDE SERPL-SCNC: 97 MMOL/L (ref 100–108)
CO2 SERPL-SCNC: 26 MMOL/L (ref 21–32)
CREAT SERPL-MCNC: 1.08 MG/DL (ref 0.6–1.3)
EOSINOPHIL # BLD AUTO: 0.42 THOUSAND/ΜL (ref 0–0.61)
EOSINOPHIL NFR BLD AUTO: 6 % (ref 0–6)
ERYTHROCYTE [DISTWIDTH] IN BLOOD BY AUTOMATED COUNT: 13.2 % (ref 11.6–15.1)
GFR SERPL CREATININE-BSD FRML MDRD: 51 ML/MIN/1.73SQ M
GLUCOSE SERPL-MCNC: 90 MG/DL (ref 65–140)
HCT VFR BLD AUTO: 37.5 % (ref 34.8–46.1)
HGB BLD-MCNC: 12.3 G/DL (ref 11.5–15.4)
IMM GRANULOCYTES # BLD AUTO: 0.03 THOUSAND/UL (ref 0–0.2)
IMM GRANULOCYTES NFR BLD AUTO: 0 % (ref 0–2)
LYMPHOCYTES # BLD AUTO: 1.08 THOUSANDS/ΜL (ref 0.6–4.47)
LYMPHOCYTES NFR BLD AUTO: 14 % (ref 14–44)
MAGNESIUM SERPL-MCNC: 2 MG/DL (ref 1.6–2.6)
MCH RBC QN AUTO: 27.5 PG (ref 26.8–34.3)
MCHC RBC AUTO-ENTMCNC: 32.8 G/DL (ref 31.4–37.4)
MCV RBC AUTO: 84 FL (ref 82–98)
MONOCYTES # BLD AUTO: 0.17 THOUSAND/ΜL (ref 0.17–1.22)
MONOCYTES NFR BLD AUTO: 2 % (ref 4–12)
NEUTROPHILS # BLD AUTO: 5.86 THOUSANDS/ΜL (ref 1.85–7.62)
NEUTS SEG NFR BLD AUTO: 77 % (ref 43–75)
NRBC BLD AUTO-RTO: 0 /100 WBCS
PLATELET # BLD AUTO: 176 THOUSANDS/UL (ref 149–390)
PMV BLD AUTO: 11.2 FL (ref 8.9–12.7)
POTASSIUM SERPL-SCNC: 4.4 MMOL/L (ref 3.5–5.3)
PROT SERPL-MCNC: 6.7 G/DL (ref 6.4–8.2)
RBC # BLD AUTO: 4.47 MILLION/UL (ref 3.81–5.12)
SODIUM SERPL-SCNC: 132 MMOL/L (ref 136–145)
WBC # BLD AUTO: 7.6 THOUSAND/UL (ref 4.31–10.16)

## 2021-08-19 PROCEDURE — 83735 ASSAY OF MAGNESIUM: CPT

## 2021-08-19 PROCEDURE — 80053 COMPREHEN METABOLIC PANEL: CPT

## 2021-08-19 PROCEDURE — 85025 COMPLETE CBC W/AUTO DIFF WBC: CPT

## 2021-08-19 NOTE — PROGRESS NOTES
Pt presents for central labs offering no complaints  Labs obtained via port without difficulty  Port flushed per protocol  Pt discharged in stable condition

## 2021-08-20 ENCOUNTER — TELEPHONE (OUTPATIENT)
Dept: HEMATOLOGY ONCOLOGY | Facility: CLINIC | Age: 71
End: 2021-08-20

## 2021-08-24 ENCOUNTER — TELEPHONE (OUTPATIENT)
Dept: GYNECOLOGIC ONCOLOGY | Facility: CLINIC | Age: 71
End: 2021-08-24

## 2021-08-24 ENCOUNTER — TELEPHONE (OUTPATIENT)
Dept: NUTRITION | Facility: CLINIC | Age: 71
End: 2021-08-24

## 2021-08-24 NOTE — TELEPHONE ENCOUNTER
Contacted Katlyn to discuss her nutrition after receiving notification by Bety Doll on 8/24/21 that pt is appropriate for oncology nutrition care (reason for referral: patient request due to desires discussion regarding her diet while on chemotherapy)  Discussed oncology nutrition services available (options for in-person and phone consultation) and the benefits of meeting for a consultation  Katlyn reports that she prefers a phone visit at this time   Initial oncology nutrition phone consultation set up for 8/30/21 12:30pm

## 2021-08-24 NOTE — TELEPHONE ENCOUNTER
Patient would like a call back regarding CT scan that was done 8/10   Patient can be reached at [phone]

## 2021-08-26 ENCOUNTER — HOSPITAL ENCOUNTER (OUTPATIENT)
Dept: INFUSION CENTER | Facility: CLINIC | Age: 71
Discharge: HOME | End: 2021-08-26
Payer: MEDICARE

## 2021-08-26 ENCOUNTER — TELEPHONE (OUTPATIENT)
Dept: SURGICAL ONCOLOGY | Facility: CLINIC | Age: 71
End: 2021-08-26

## 2021-08-26 ENCOUNTER — TELEPHONE (OUTPATIENT)
Dept: GYNECOLOGIC ONCOLOGY | Facility: CLINIC | Age: 71
End: 2021-08-26

## 2021-08-26 VITALS — TEMPERATURE: 97.6 F

## 2021-08-26 DIAGNOSIS — Z45.2 ENCOUNTER FOR CENTRAL LINE CARE: ICD-10-CM

## 2021-08-26 DIAGNOSIS — C54.1 ENDOMETRIAL CANCER (HCC): Primary | ICD-10-CM

## 2021-08-26 LAB
ALBUMIN SERPL BCP-MCNC: 2.7 G/DL (ref 3.5–5)
ALP SERPL-CCNC: 121 U/L (ref 46–116)
ALT SERPL W P-5'-P-CCNC: 41 U/L (ref 12–78)
ANION GAP SERPL CALCULATED.3IONS-SCNC: 4 MMOL/L (ref 4–13)
AST SERPL W P-5'-P-CCNC: 36 U/L (ref 5–45)
BASOPHILS # BLD AUTO: 0.03 THOUSANDS/ΜL (ref 0–0.1)
BASOPHILS NFR BLD AUTO: 1 % (ref 0–1)
BILIRUB SERPL-MCNC: 0.19 MG/DL (ref 0.2–1)
BUN SERPL-MCNC: 19 MG/DL (ref 5–25)
CALCIUM ALBUM COR SERPL-MCNC: 9.7 MG/DL (ref 8.3–10.1)
CALCIUM SERPL-MCNC: 8.7 MG/DL (ref 8.3–10.1)
CHLORIDE SERPL-SCNC: 97 MMOL/L (ref 100–108)
CO2 SERPL-SCNC: 29 MMOL/L (ref 21–32)
CREAT SERPL-MCNC: 0.9 MG/DL (ref 0.6–1.3)
EOSINOPHIL # BLD AUTO: 0.14 THOUSAND/ΜL (ref 0–0.61)
EOSINOPHIL NFR BLD AUTO: 3 % (ref 0–6)
ERYTHROCYTE [DISTWIDTH] IN BLOOD BY AUTOMATED COUNT: 13.2 % (ref 11.6–15.1)
GFR SERPL CREATININE-BSD FRML MDRD: 64 ML/MIN/1.73SQ M
GLUCOSE P FAST SERPL-MCNC: 85 MG/DL (ref 65–99)
GLUCOSE SERPL-MCNC: 85 MG/DL (ref 65–140)
HCT VFR BLD AUTO: 34.1 % (ref 34.8–46.1)
HGB BLD-MCNC: 11.2 G/DL (ref 11.5–15.4)
IMM GRANULOCYTES # BLD AUTO: 0.01 THOUSAND/UL (ref 0–0.2)
IMM GRANULOCYTES NFR BLD AUTO: 0 % (ref 0–2)
LYMPHOCYTES # BLD AUTO: 0.97 THOUSANDS/ΜL (ref 0.6–4.47)
LYMPHOCYTES NFR BLD AUTO: 22 % (ref 14–44)
MAGNESIUM SERPL-MCNC: 2 MG/DL (ref 1.6–2.6)
MCH RBC QN AUTO: 27.3 PG (ref 26.8–34.3)
MCHC RBC AUTO-ENTMCNC: 32.8 G/DL (ref 31.4–37.4)
MCV RBC AUTO: 83 FL (ref 82–98)
MONOCYTES # BLD AUTO: 0.46 THOUSAND/ΜL (ref 0.17–1.22)
MONOCYTES NFR BLD AUTO: 11 % (ref 4–12)
NEUTROPHILS # BLD AUTO: 2.78 THOUSANDS/ΜL (ref 1.85–7.62)
NEUTS SEG NFR BLD AUTO: 63 % (ref 43–75)
NRBC BLD AUTO-RTO: 0 /100 WBCS
PLATELET # BLD AUTO: 282 THOUSANDS/UL (ref 149–390)
PMV BLD AUTO: 10 FL (ref 8.9–12.7)
POTASSIUM SERPL-SCNC: 4.1 MMOL/L (ref 3.5–5.3)
PROT SERPL-MCNC: 6.5 G/DL (ref 6.4–8.2)
RBC # BLD AUTO: 4.1 MILLION/UL (ref 3.81–5.12)
SODIUM SERPL-SCNC: 130 MMOL/L (ref 136–145)
WBC # BLD AUTO: 4.39 THOUSAND/UL (ref 4.31–10.16)

## 2021-08-26 PROCEDURE — 85025 COMPLETE CBC W/AUTO DIFF WBC: CPT

## 2021-08-26 PROCEDURE — 83735 ASSAY OF MAGNESIUM: CPT

## 2021-08-26 PROCEDURE — 80053 COMPREHEN METABOLIC PANEL: CPT

## 2021-08-26 PROCEDURE — 86304 IMMUNOASSAY TUMOR CA 125: CPT

## 2021-08-26 NOTE — PROGRESS NOTES
Pt presents for port flush and central labs via port  Port accessed, labs obtained, saline locked and de accessed w/o incident  Pt tolerated well  AVS declined  Aware of next appt

## 2021-08-26 NOTE — TELEPHONE ENCOUNTER
Patient called to report mild ankle swelling  She has know calf thrombi b/l  She and Dr Giron Standard have decided against anticoagulation  Wanted to make our office aware  No other symptoms

## 2021-08-27 LAB — CANCER AG125 SERPL-ACNC: 189.3 U/ML (ref 0–30)

## 2021-08-30 ENCOUNTER — TELEPHONE (OUTPATIENT)
Dept: SURGICAL ONCOLOGY | Facility: CLINIC | Age: 71
End: 2021-08-30

## 2021-08-30 ENCOUNTER — NUTRITION (OUTPATIENT)
Dept: NUTRITION | Facility: CLINIC | Age: 71
End: 2021-08-30

## 2021-08-30 DIAGNOSIS — Z71.3 NUTRITIONAL COUNSELING: Primary | ICD-10-CM

## 2021-08-30 NOTE — PATIENT INSTRUCTIONS
Nutrition Rx & Recommendations:  · Diet: High Calorie, High Protein (for high calorie foods see pages 52-53, and for high protein foods see pages 49-51 in your Eating Hints book)  · Keep a daily food journal (pen/paper, jose such as gogamingo)  · Small, frequent meals/snacks may be easier to tolerate than 3 large daily meals  Aim for 5-6 small meals per day (every 2-3 hours)  · Include protein at all meals/snacks  · Include a variety of foods (as tolerated/allowed by diet)  · Incorporate physical activity as able/allowed  · Stay hydrated by sipping fluids of choice/tolerance throughout the day  · Liquid nutrition may be better tolerated than solids at times  · Alter food choices and eating patterns to accommodate changing needs  · Refer to Eating Hints book for other meal/snack ideas and symptom management  · For constipation: drink plenty of fluids (>64 oz/day); drink hot liquids; eat high-fiber foods as tolerated (whole grains, beans, peas, nuts, seeds, fruits, vegetables, etc); increase physical activity as tolerated  Avoid increasing fiber intake too quickly, add fiber into your diet slowly; keep a record of your bowel movements (see pages 13-14 in you Eating Hints book)  · For diarrhea: drink plenty of fluids (>64 oz/day), avoid carbonation; eat 5-6 small meals/day; include high sodium & potassium foods/liquids (Sodium: soup/broth; Potassium: bananas, canned apricots, potatoes); eat low-fiber foods; choose foods/liquids that are room temperature; avoid foods/drinks that can make diarrhea worse (ex  high fiber, high sugar, hot/cold drinks, greasy/fatty foods, gas forming foods such as beans, raw produce, milk products, alcohol, spicy foods, caffeine, sugar alcohols (xylitol, sorbitol), and apple juice (high in sorbitol) (see pages 15-16 in your Eating Hints book)    If diarrhea is severe, consider adding Banatrol (banana flakes) 1-3 times per day mixed in applesauce (can be purchased online from [employer])  · Weigh yourself regularly  If you notice weight loss, make an effort to increase your daily food/calorie intake  If you continue to notice loss after these efforts, reach out to your dietitian to establish a plan to stabilize weight  · High calorie foods to try: avocado, peanut butter, nuts/seeds  (see pages 52-53 in your Eating Hints book)  · High protein foods to try: eggs, chicken, fish, nuts/nut butters  (see pages 49-51 in your Eating Hints book)  · Avoid gas-producing foods such as broccoli, cabbage, sauerkraut, [place] sprouts, cauliflower, corn, cucumbers, onions, peppers, beans, peas, lentils, apples, apple juice, avocado, and melons  Carbonated drinks, chewing gum, and drinking through a straw can also cause gas  Limiting lactose may help for those who are sensitive to milk products  · Continue homemade smoothies, can add various fruits to add variety       Follow Up Plan: 9/9/21 during infusion   Recommend Referral to Other Providers: none at this time

## 2021-08-30 NOTE — PROGRESS NOTES
Outpatient Oncology Nutrition Consultation   Type of Consult: Initial Consult  Care Location: Telephone Call    Reason for referral: Brandon Hubbard on 8/24/21 (reason for referral: patient request due to desires discussion regarding her diet while on chemotherapy)  Nutrition Assessment:   Oncology Diagnosis & Treatments: Diagnosed with endometrial cancer 5/2021, s/p total laparoscopic hysterectomy and bilateral salpingo-oophrectomy 5/2021  Began chemotherapy (aloxi, emend, carboplatin, taxol) 7/19/21  Oncology History   Endometrial cancer ([facility] [address] )   5/15/2021 Initial Diagnosis    Endometrial cancer ([facility] [address] )     5/19/2021 -  Cancer Staged    Staging form: Corpus Uteri - Carcinoma, AJCC 8th Edition  - Clinical: FIGO Stage I (cT1, cN0, cM0) - Signed by Tomas Peña MD on 5/19/2021  Histologic grade (G): G3  Histologic grading system: 3 grade system       5/2021 Surgery      Robotically assisted total laparoscopic hysterectomy bilateral salpingo-oophorectomy and sentinel lymph node mapping and biopsy for stage III C1 grade 3 endometrial cancer with 17 of 18 mm invasion, cervical stromal involvement and bilateral positive nodes in the pelvis  Patient has also had small 2-5 mm pulmonary nodules which are too small to characterize as   Metastatic disease  The patient has a history of multiple pneumonias as a child       6/16/2021 -  Cancer Staged    Staging form: Corpus Uteri - Carcinoma, AJCC 8th Edition  - Pathologic: FIGO Stage IIIC1 (pT2, pN1, cM0) - Signed by Tomas Peña MD on 6/16/2021  Histologic grade (G): G3  Histologic grading system: 3 grade system  Residual tumor (R): R0 - None       7/19/2021 -  Chemotherapy    palonosetron (ALOXI), 0 25 mg, Intravenous, Once, 2 of 6 cycles  Administration: 0 25 mg (7/19/2021), 0 25 mg (8/16/2021)  fosaprepitant (EMEND) IVPB, 150 mg, Intravenous, Once, 2 of 6 cycles  Administration: 150 mg (7/19/2021), 150 mg (8/16/2021)  CARBOplatin (PARAPLATIN) IVPB (GOG AUC DOSING), 436 mg, Intravenous, Once, 2 of 6 cycles  Administration: 436 mg (7/19/2021), 280 4 mg (8/16/2021)  PACLItaxel (TAXOL) chemo IVPB, 135 mg/m2 = 220 2 mg (77 1 % of original dose 175 mg/m2), Intravenous, Once, 2 of 6 cycles  Dose modification: 135 mg/m2 (original dose 175 mg/m2, Cycle 1, Reason: Dose modified as per discussion with consulting physician), 110 mg/m2 (original dose 175 mg/m2, Cycle 2, Reason: Dose Not Tolerated, Comment: Neuropathy and pain)  Administration: 220 2 mg (7/19/2021), 177 mg (8/16/2021)     8/2021 Genomic Testing    Insight testing performed      This reveals KRAS mutation, RADr1c (data for 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 of olaparib) PDL-1 for a tumor proportions score of 70%       Past Medical & Surgical Hx:   Patient Active Problem List   Diagnosis    Osteoporosis of multiple sites    Encounter for gynecological examination without abnormal finding    PMB (postmenopausal bleeding)    H/O uterine prolapse    [facility]omet[facility] ([facility] [address] )    History of robot-assisted laparoscopic hysterectomy, BSO, SLND    Encounter for central line care     Past Medical History:   Diagnosis Date    Breast cancer ([facility] [address] )     History of transfusion 2018    PONV (postoperative nausea and vomiting)      Past Surgical History:   Procedure Laterality Date    BREAST LUMPECTOMY Right 2007    COLONOSCOPY      DILATION AND CURETTAGE OF UTERUS      FEMUR SURGERY Right 2018    FL GUIDED CENTRAL VENOUS ACCESS DEVICE INSERTION  7/13/2021    HYSTERECTOMY      NV INSJ TUNNELED CTR VAD W/SUBQ PORT AGE 5 YR/> N/A 7/13/2021    Procedure: INSERTION VENOUS PORT ( PORT-A-CATH) IR;  Surgeon: Caroline De Jesus DO;  [facility];  Service: Interventional Radiology    NV LAPAROSCOPY W TOT HYSTERECTUTERUS <=250 GRAM  W TUBE/OVARY N/A 6/3/2021    Procedure: ROBOT ASSISTED TOTAL LAPAROSCOPIC HYSTERECTOMY, BILATERAL SALPINGO-OOPHORECTOMY, SENTINEL LYMPH NODE MAPPING AND BIOPSY;  Surgeon: Polly Gil MD; Location: [facility];  Service: Gynecology Oncology    TUBAL LIGATION         Review of Medications:   Vitamins, Supplements and Herbals: Med List Reviewed & pt is only taking: probiotic, vitamin D, cranberry     Current Outpatient Medications:     acetaminophen (TYLENOL) 500 mg tablet, Take 500 mg by mouth every 6 (six) hours as needed for mild pain, Disp: , Rfl:     Ascorbic Acid (vitamin C) 1000 MG tablet, Take 1,000 mg by mouth daily, Disp: , Rfl:     azelastine (OPTIVAR) 0 05 % ophthalmic solution, Administer 1 drop to both eyes daily as needed, Disp: , Rfl:     Calcium Carb-Cholecalciferol (CALCIUM 1000 + D PO), Take 2,000 Units by mouth  , Disp: , Rfl:     Cholecalciferol (Vitamin D3) 25 MCG (1000 UT) CAPS, Take 1 capsule by mouth 2 (two) times a day, Disp: , Rfl:     Cranberry 1000 MG CAPS, Take by mouth, Disp: , Rfl:     diphenhydrAMINE HCl (BENADRYL ALLERGY PO), Take by mouth, Disp: , Rfl:     LORazepam (ATIVAN) 1 mg tablet, Take 1 tablet (1 mg total) by mouth every 6 (six) hours as needed for anxiety (or nausea), Disp: 36 tablet, Rfl: 1    LUTEIN PO, Take 1 tablet by mouth daily, Disp: , Rfl:     methylPREDNISolone 4 MG tablet therapy pack, Use as directed on package, Disp: 21 tablet, Rfl: 0    metroNIDAZOLE (METROCREAM) 0 75 % cream, Apply topically 2 (two) times a day, Disp: , Rfl:     naloxone (NARCAN) 4 mg/0 1 mL nasal spray, Administer 1 spray into a nostril   If no response after 2-3 minutes, give another dose in the other nostril using a new spray , Disp: 1 each, Rfl: 1    nitrofurantoin (MACROBID) 100 mg capsule, Take 1 capsule (100 mg total) by mouth 2 (two) times a day, Disp: 10 capsule, Rfl: 0    ondansetron (ZOFRAN) 8 mg tablet, Take 1 tablet (8 mg total) by mouth every 8 (eight) hours as needed for nausea or vomiting, Disp: 30 tablet, Rfl: 1    oxyCODONE (ROXICODONE) 5 mg immediate release tablet, 1 -2 tabs by mouth every 4-6 hour as needed, Disp: 10 tablet, Rfl: 0   Probiotic Product (CULTURELLE PROBIOTICS PO), Take by mouth, Disp: , Rfl:     vitamin E, tocopherol, 1,000 units capsule, Take 1,000 Units by mouth daily, Disp: , Rfl:     VITAMIN K PO, Take 50 mcg by mouth daily , Disp: , Rfl:     Vitamins A & D (VITAMIN A & D PO), Take by mouth, Disp: , Rfl:     Most Recent Lab Results:   Lab Results   Component Value Date    WBC 4 39 08/26/2021    NEUTROABS 2 78 08/26/2021    ALT 41 08/26/2021    AST 36 08/26/2021    ALB 2 7 (L) 08/26/2021    SODIUM 130 (L) 08/26/2021    SODIUM 132 (L) 08/19/2021    K 4 1 08/26/2021    K 4 4 08/19/2021    CL 97 (L) 08/26/2021    BUN 19 08/26/2021    BUN 23 08/19/2021    CREATININE 0 90 08/26/2021    CREATININE 1 08 08/19/2021    EGFR 64 08/26/2021    GLUF 85 08/26/2021    GLUF 98 05/21/2021    GLUC 85 08/26/2021    HGBA1C 5 9 (H) 05/21/2021    HGBA1C 5 6 03/16/2017    CALCIUM 8 7 08/26/2021    MG 2 0 08/26/2021       Anthropometric Measurements:   Height: 64"  Ht Readings from Last 1 Encounters:   08/16/21 5' 4 57" (1 64 m)     Wt Readings from Last 20 Encounters:   08/16/21 56 kg (123 lb 7 3 oz)   08/04/21 56 2 kg (124 lb)   07/19/21 54 8 kg (120 lb 13 oz)   07/08/21 55 8 kg (123 lb)   06/30/21 55 8 kg (123 lb)   06/16/21 54 4 kg (120 lb)   06/03/21 54 9 kg (121 lb)   05/19/21 55 1 kg (121 lb 8 oz)   05/07/21 56 kg (123 lb 6 4 oz)   09/14/20 56 2 kg (124 lb)   08/31/20 55 9 kg (123 lb 5 oz)   09/05/18 56 8 kg (125 lb 4 oz)       Weight History:    Usual Weight: 120-124#   Varian: n/a   Home Scale: doesn't have home scale    Oncology Nutrition-Anthropometrics      Nutrition from 8/30/2021 in [facility] SPECIAL[facility] Oncology Dietitian Kay   Patient age (years):  66 years   Patient (female) height (in):  64 in   Current Weight to be used for anthropometric calculations (lbs)  123 5 lbs   Current Weight to be used for anthropometric calculations (kg)  56 1 kg   BMI:  21 2   IBW female:  120 lbs   IBW (kg) female:  54 5 kg   IBW % (female)  102 9 % Adjusted BW (female):  120 9 lbs   Adjusted BW kg (female):  55 kg   % weight change after 1 month:  2 2 %   Weight change after 1 month (lbs)  2 7 lbs   % weight change after 3 months:  1 6 %   Weight change after 3 months (lbs)  2 lbs          Nutrition-Focused Physical Findings: n/a due to telephone call    Food/Nutrition-Related History & Client/Social History:    Current Nutrition Impact Symptoms:  [] Nausea  [x] Reduced Appetite  [] Acid Reflux    [] Vomiting  [] Unintended Wt Loss  [] Malabsorption    [x] Diarrhea  [] Unintended Wt Gain  [] Dumping Syndrome    [x] Constipation  [] Thick Mucous/Secretions  [x] Abdominal Pain    [] Dysgeusia (Altered Taste)  [] Xerostomia (Dry Mouth)  [x] Gas    [] Dysosmia (Altered Smell)  [] Thrush  [] Difficulty Chewing    [x] Oral Mucositis (Sore Mouth)  [] Fatigue  [] Hyperglycemia    [] Odynophagia  [] Esophagitis  [] Other:    [] Dysphagia  [] Early Satiety  [] No Problems Eating      Food Allergies & Intolerances: yes: dairy products     Current Diet: Lactose-Free  Current Nutrition Intake: Less than usual   Appetite: Fluctuating  Nutrition Route: PO  Oral Care: brushes BID with baking soda toothpaste    Activity level: ADL     24 Hr Diet Recall:   Breakfast: 1/2c oatmeal with peaches and 1/2 banana  Lunch:egg fried with 1/2 slice white toast, avocado spread, roasted pecans  Snack: pretzels, pistachios  Dinner: 1/4 chicken breast, string beans OR pasta with butter  Snack: yogurt with raspberries    Beverages: water (8oz x2)  Supplements:    Homemade Smoothies/Shakes ; powdered egg protein and almond milk       Oncology Nutrition-Estimated Needs      Nutrition from 2021 in [address] [place] [address] Dietitian Kay   Weight type used  Actual weight   Weight in kilograms (kg) used for estimated needs  56 1 kg   Energy needs formula:   30-35 kcal/kg   Energy needs based on 30 kcal/k kcal   Energy needs based on 35 kcal/k kcal   Protein needs formula: 1 2-1 5 g/kg   Protein needs based on 1 2 g/k g   Protein needs based on 1 5 g/kg  84 g   Fluid needs formula:  30-35 mL/kg   Fluid needs based on 30 mL/kg  1692 mL   Fluid needs in ounces  57 oz   Fluid needs based on 35 mL/kg  1974 mL   Fluid needs in ounces  67 oz           Discussion & Intervention:   Deb Almanza was evaluated today for an initial RD consultation regarding guidance on diet during cancer treatment   Katlyn is currently undergoing tx for endometrial cancer  Today Rossana Amato explains that she has been experiencing some abdominal discomfort, diarrhea, and constipation after her treatments  She is looking for guidance on her diet throughout treatment  Reviewed 24 hour recall, which revealed an adequate po intake, and discussed ways to optimize nutrient intake and increase fluid intake  Also reviewed the importance of wt management throughout the tx process and the role of a high kcal/ high protein diet in managing wt and overall health  Based on today's assessment, discussion included: MNT for: diarrhea, constipation, gas, appetite changes, how to modify foods for anticipated nutrition impact symptoms pt may experience during CA tx, a high calorie high protein  diet & food choices to include at all meals & snacks, adequate hydration & fluid choices, eating smaller more frequent meals every 2-3 hours (5-6 small meals/day), having consistent and planned snacks between meals, eating when feeling most hungry and continuing homemade smoothies and adding fruit for additional calories  Moving forward, Katlyn was encouraged to increase fluid intake  Materials Provided: all mailed to pt: Eating Hints Book, Increasing Calories and Protein handout, Oncology Nutrition Brochure   All questions and concerns addressed during todays visit  Katlyn has RD contact information      Nutrition Diagnosis:    Increased Nutrient Needs (kcal & pro) related to increased demand for nutrients and disease state as evidenced by cancer dx and pt undergoing tx for cancer  Monitoring & Evaluation:   Goals:  · weight maintenance/stabilization  · adequate nutrition impact symptom management  · pt to meet >/=75% estimated nutrition needs daily    · Progress Towards Goals: Initiated    Nutrition Rx & Recommendations:  · Diet: High Calorie, High Protein (for high calorie foods see pages 52-53, and for high protein foods see pages 49-51 in your Eating Hints book)  · Keep a daily food journal (pen/paper, jose such as [employer]wrÃ¼m)  · Small, frequent meals/snacks may be easier to tolerate than 3 large daily meals  Aim for 5-6 small meals per day (every 2-3 hours)  · Include protein at all meals/snacks  · Include a variety of foods (as tolerated/allowed by diet)  · Incorporate physical activity as able/allowed  · Stay hydrated by sipping fluids of choice/tolerance throughout the day  · Liquid nutrition may be better tolerated than solids at times  · Alter food choices and eating patterns to accommodate changing needs  · Refer to Eating Hints book for other meal/snack ideas and symptom management  · For constipation: drink plenty of fluids (>64 oz/day); drink hot liquids; eat high-fiber foods as tolerated (whole grains, beans, peas, nuts, seeds, fruits, vegetables, etc); increase physical activity as tolerated  Avoid increasing fiber intake too quickly, add fiber into your diet slowly; keep a record of your bowel movements (see pages 13-14 in you Eating Hints book)  · For diarrhea: drink plenty of fluids (>64 oz/day), avoid carbonation; eat 5-6 small meals/day; include high sodium & potassium foods/liquids (Sodium: soup/broth;   Potassium: bananas, canned apricots, potatoes); eat low-fiber foods; choose foods/liquids that are room temperature; avoid foods/drinks that can make diarrhea worse (ex  high fiber, high sugar, hot/cold drinks, greasy/fatty foods, gas forming foods such as beans, raw produce, milk products, alcohol, spicy foods, caffeine, sugar alcohols (xylitol, sorbitol), and apple juice (high in sorbitol) (see pages 15-16 in your Eating Hints book)  If diarrhea is severe, consider adding Banatrol (banana flakes) 1-3 times per day mixed in applesauce (can be purchased online from [address])  · Weigh yourself regularly  If you notice weight loss, make an effort to increase your daily food/calorie intake  If you continue to notice loss after these efforts, reach out to your dietitian to establish a plan to stabilize weight  · High calorie foods to try: avocado, peanut butter, nuts/seeds  (see pages 52-53 in your Eating Hints book)  · High protein foods to try: eggs, chicken, fish, nuts/nut butters  (see pages 49-51 in your Eating Hints book)  · Avoid gas-producing foods such as broccoli, cabbage, sauerkraut, [place] sprouts, cauliflower, corn, cucumbers, onions, peppers, beans, peas, lentils, apples, apple juice, avocado, and melons  Carbonated drinks, chewing gum, and drinking through a straw can also cause gas  Limiting lactose may help for those who are sensitive to milk products  · Continue homemade smoothies, can add various fruits to add variety       Follow Up Plan: 9/9/21 during infusion   Recommend Referral to Other Providers: none at this time

## 2021-09-01 ENCOUNTER — OFFICE VISIT (OUTPATIENT)
Dept: GYNECOLOGIC ONCOLOGY | Facility: CLINIC | Age: 71
End: 2021-09-01
Payer: MEDICARE

## 2021-09-01 ENCOUNTER — TELEPHONE (OUTPATIENT)
Dept: HEMATOLOGY ONCOLOGY | Facility: CLINIC | Age: 71
End: 2021-09-01

## 2021-09-01 ENCOUNTER — HOSPITAL ENCOUNTER (OUTPATIENT)
Dept: NON INVASIVE DIAGNOSTICS | Facility: CLINIC | Age: 71
Discharge: HOME | End: 2021-09-01
Payer: MEDICARE

## 2021-09-01 VITALS
HEART RATE: 93 BPM | SYSTOLIC BLOOD PRESSURE: 122 MMHG | HEIGHT: 65 IN | DIASTOLIC BLOOD PRESSURE: 72 MMHG | RESPIRATION RATE: 16 BRPM | WEIGHT: 128.5 LBS | BODY MASS INDEX: 21.41 KG/M2 | TEMPERATURE: 97.4 F

## 2021-09-01 DIAGNOSIS — O22.30 DVT (DEEP VEIN THROMBOSIS) IN PREGNANCY: ICD-10-CM

## 2021-09-01 DIAGNOSIS — C54.1 ENDOMETRIAL CANCER (HCC): ICD-10-CM

## 2021-09-01 DIAGNOSIS — C54.1 ENDOMETRIAL CANCER (HCC): Primary | ICD-10-CM

## 2021-09-01 PROBLEM — I82.409 DVT (DEEP VENOUS THROMBOSIS) (HCC): Status: ACTIVE | Noted: 2021-09-01

## 2021-09-01 PROCEDURE — 93970 EXTREMITY STUDY: CPT

## 2021-09-01 PROCEDURE — 99024 POSTOP FOLLOW-UP VISIT: CPT | Performed by: OBSTETRICS & GYNECOLOGY

## 2021-09-01 PROCEDURE — 93970 EXTREMITY STUDY: CPT | Performed by: SURGERY

## 2021-09-01 NOTE — TELEPHONE ENCOUNTER
Patient states she did go to vascular US and the technician informed her that the clot did grown  Patient would like to speak with Mariola Fish about blood thinners  Results in epic

## 2021-09-01 NOTE — LETTER
September 1, 2021     Ilia Bangura, [address] [facility] [address]    Patient: Colonel Covington   YOB: 1950   Date of Visit: 9/1/2021       Dear Dr Juan A Miller: Thank you for referring Colonel Covington to me for evaluation  Below are my notes for this consultation  If you have questions, please do not hesitate to call me  I look forward to following your patient along with you  Sincerely,        Kit Corbin MD        CC: MD Kit Hong MD  9/1/2021 10:09 AM  Sign when Signing Visit  Assessment/Plan:    Problem List Items Addressed This Visit        Genitourinary    Endometrial cancer [facility]) - Primary       Patient is very pleasant 25-year-old female with a history of high-grade stage III B 1 endometrial adenocarcinoma status post robotic hysterectomy and now 2 cycles of carboplatin paclitaxel  The patient has symptoms of abdominal bloating although her exam is nonfocal she does have some abdominal distension  Her blood work is stable for continued treatment at present dosage  However her  is also elevated  We have recommended a repeat  and CT scan after this current cycle  We will continue present treatment at present dosage as her biochemical parameters are tolerant of this  With regard to lower extremity swelling it is difficult to tell whether her clot has propagated to a point where we would strongly recommend treatment  The patient has been resistant to treatment with anticoagulations at this time  If there is clot extending above the knee we would consider   Strongly anticoagulation     We have recommended stat ultrasound and Dopplers of the lower extremities    Patient will follow-up with current treatment plan         Relevant Orders    VAS lower limb venous duplex study, complete bilateral    CT chest abdomen pelvis wo contrast            CHIEF COMPLAINT:   Consideration of cycle 3 carboplatin paclitaxel for stage IIIC1 endometrial adenocarcinoma      Problem:  Cancer Staging  Endometrial cancer [facility])  Staging form: Corpus Uteri - Carcinoma, AJCC 8th Edition  - Clinical: FIGO Stage I (cT1, cN0, cM0) - Signed by Angelica Murphy MD on 5/19/2021  - Pathologic: FIGO Stage IIIC1 (pT2, pN1, cM0) - Signed by Angelica Murphy MD on 6/16/2021        Previous therapy:  Oncology History   Endometrial cancer ([facility] [address] )   5/15/2021 Initial Diagnosis    Endometrial cancer ([facility] [address] )     5/19/2021 -  Cancer Staged    Staging form: Corpus Uteri - Carcinoma, AJCC 8th Edition  - Clinical: FIGO Stage I (cT1, cN0, cM0) - Signed by Angelica Murphy MD on 5/19/2021  Histologic grade (G): G3  Histologic grading system: 3 grade system       5/2021 Surgery      Robotically assisted total laparoscopic hysterectomy bilateral salpingo-oophorectomy and sentinel lymph node mapping and biopsy for stage III C1 grade 3 endometrial cancer with 17 of 18 mm invasion, cervical stromal involvement and bilateral positive nodes in the pelvis  Patient has also had small 2-5 mm pulmonary nodules which are too small to characterize as   Metastatic disease  The patient has a history of multiple pneumonias as a child       6/16/2021 -  Cancer Staged    Staging form: Corpus Uteri - Carcinoma, AJCC 8th Edition  - Pathologic: FIGO Stage IIIC1 (pT2, pN1, cM0) - Signed by Angelica Murphy MD on 6/16/2021  Histologic grade (G): G3  Histologic grading system: 3 grade system  Residual tumor (R): R0 - None       7/19/2021 -  Chemotherapy    palonosetron (ALOXI), 0 25 mg, Intravenous, Once, 2 of 6 cycles  Administration: 0 25 mg (7/19/2021), 0 25 mg (8/16/2021)  fosaprepitant (EMEND) IVPB, 150 mg, Intravenous, Once, 2 of 6 cycles  Administration: 150 mg (7/19/2021), 150 mg (8/16/2021)  CARBOplatin (PARAPLATIN) IVPB ([facility] [place] DOSING), 436 mg, Intravenous, Once, 2 of 6 cycles  Administration: 436 mg (7/19/2021), 280 4 mg (8/16/2021)  PACLItaxel (TAXOL) chemo IVPB, 135 mg/m2 = 220 2 mg (77 1 % of original dose 175 mg/m2), Intravenous, Once, 2 of 6 cycles  Dose modification: 135 mg/m2 (original dose 175 mg/m2, Cycle 1, Reason: Dose modified as per discussion with consulting physician), 110 mg/m2 (original dose 175 mg/m2, Cycle 2, Reason: Dose Not Tolerated, Comment: Neuropathy and pain)  Administration: 220 2 mg (7/19/2021), 177 mg (8/16/2021)     8/2021 Genomic Testing    Insight testing performed  This reveals KRAS mutation, RADr1c (data for 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 of olaparib) PDL-1 for a tumor proportions score of 70%           Patient ID: Kianna Cox is a 66 y o  female   Patient is very pleasant 59-year-old female with history of stage IIIC 1 endometrial adenocarcinoma status post  Robotically assisted total laparoscopic hysterectomy bilateral salpingo-oophorectomy and sentinel lymph node mapping and biopsies for high-grade endometrial cancer  She has undergone 2 cycles of carboplatin paclitaxel therapy and presents in consideration of her 3rd  I have reviewed the patient's weekly CBC and CPM P data  They are stable for consideration of cycle 3  The patient's  has risen  To 189 from 68  The patient's hematologic parameters are unremarkable  The patient's biochemical profile as indicate a slight elevation in alk-phos and a slight reduction in sodium  Today, the patient is doing well  She denies significant abdominal pain, pelvic pain, nausea, vomiting, constipation, diarrhea, fevers, chills, or vaginal bleeding  She does note some abdominal bloating  Intermittent diarrhea after her last treatment  She does note some continued leg swelling for which she is wearing compression stockings  She does have a 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        The following portions of the patient's history were reviewed and updated as appropriate: allergies, current medications, past family history, past social history, past surgical history and problem list     Review of Systems   Constitutional: Negative  HENT: Negative  Eyes: Negative  Respiratory: Negative  Cardiovascular: Negative  Gastrointestinal: Positive for abdominal distention  Endocrine: Negative  Genitourinary: Negative  Musculoskeletal: Negative  Skin: Negative  Neurological: Negative  Hematological: Negative  Psychiatric/Behavioral: Negative  Current Outpatient Medications   Medication Sig Dispense Refill    acetaminophen (TYLENOL) 500 mg tablet Take 500 mg by mouth every 6 (six) hours as needed for mild pain      Ascorbic Acid (vitamin C) 1000 MG tablet Take 1,000 mg by mouth daily      azelastine (OPTIVAR) 0 05 % ophthalmic solution Administer 1 drop to both eyes daily as needed      Calcium Carb-Cholecalciferol (CALCIUM 1000 + D PO) Take 2,000 Units by mouth        Cholecalciferol (Vitamin D3) 25 MCG (1000 UT) CAPS Take 1 capsule by mouth 2 (two) times a day      Cranberry 1000 MG CAPS Take by mouth      diphenhydrAMINE HCl (BENADRYL ALLERGY PO) Take by mouth      LORazepam (ATIVAN) 1 mg tablet Take 1 tablet (1 mg total) by mouth every 6 (six) hours as needed for anxiety (or nausea) 36 tablet 1    LUTEIN PO Take 1 tablet by mouth daily      methylPREDNISolone 4 MG tablet therapy pack Use as directed on package 21 tablet 0    metroNIDAZOLE (METROCREAM) 0 75 % cream Apply topically 2 (two) times a day      naloxone (NARCAN) 4 mg/0 1 mL nasal spray Administer 1 spray into a nostril  If no response after 2-3 minutes, give another dose in the other nostril using a new spray   1 each 1    nitrofurantoin (MACROBID) 100 mg capsule Take 1 capsule (100 mg total) by mouth 2 (two) times a day 10 capsule 0    ondansetron (ZOFRAN) 8 mg tablet Take 1 tablet (8 mg total) by mouth every 8 (eight) hours as needed for nausea or vomiting 30 tablet 1    oxyCODONE (ROXICODONE) 5 mg immediate release tablet 1 -2 tabs by mouth every 4-6 hour as needed 10 tablet 0    Probiotic Product (CULTURELLE PROBIOTICS PO) Take by mouth      vitamin E, tocopherol, 1,000 units capsule Take 1,000 Units by mouth daily      VITAMIN K PO Take 50 mcg by mouth daily       Vitamins A & D (VITAMIN A & D PO) Take by mouth       No current facility-administered medications for this visit  Objective:    Blood pressure 122/72, pulse 93, temperature (!) 97 4 °F (36 3 °C), resp  rate 16, height 5' 4 57" (1 64 m), weight 58 3 kg (128 lb 8 oz)  Body mass index is 21 67 kg/m²  Body surface area is 1 63 meters squared  Physical Exam  Constitutional:       Appearance: She is well-developed  HENT:      Head: Normocephalic and atraumatic  Neck:      Thyroid: No thyromegaly  Cardiovascular:      Rate and Rhythm: Normal rate and regular rhythm  Heart sounds: Normal heart sounds  Pulmonary:      Effort: Pulmonary effort is normal       Breath sounds: Normal breath sounds  Abdominal:      General: Bowel sounds are normal  There is distension  Palpations: Abdomen is soft  Comments: Well healed laparoscopic incisions  Genitourinary:     Comments: deferred    Musculoskeletal:         General: Normal range of motion  Cervical back: Normal range of motion and neck supple  Lymphadenopathy:      Cervical: No cervical adenopathy  Skin:     General: Skin is warm and dry  Neurological:      Mental Status: She is alert and oriented to person, place, and time     Psychiatric:         Behavior: Behavior normal          Lab Results   Component Value Date     189 3 (H) 08/26/2021     Lab Results   Component Value Date    K 4 1 08/26/2021    CL 97 (L) 08/26/2021    CO2 29 08/26/2021    BUN 19 08/26/2021    CREATININE 0 90 08/26/2021    GLUF 85 08/26/2021    CALCIUM 8 7 08/26/2021    CORRECTEDCA 9 7 08/26/2021    AST 36 08/26/2021    ALT 41 08/26/2021    ALKPHOS 121 (H) 08/26/2021    EGFR 64 08/26/2021     Lab Results   Component Value Date    WBC 4 39 08/26/2021    HGB 11 2 (L) 08/26/2021    HCT 34 1 (L) 08/26/2021    MCV 83 08/26/2021     08/26/2021     Lab Results   Component Value Date    NEUTROABS 2 78 08/26/2021        Trend:  Lab Results   Component Value Date     189 3 (H) 08/26/2021     63 6 (H) 08/05/2021     71 0 (H) 07/12/2021

## 2021-09-01 NOTE — ASSESSMENT & PLAN NOTE
Patient is very pleasant 69-year-old female with a history of high-grade stage III B 1 endometrial adenocarcinoma status post robotic hysterectomy and now 2 cycles of carboplatin paclitaxel  The patient has symptoms of abdominal bloating although her exam is nonfocal she does have some abdominal distension  Her blood work is stable for continued treatment at present dosage  However her  is also elevated  We have recommended a repeat  and CT scan after this current cycle  We will continue present treatment at present dosage as her biochemical parameters are tolerant of this  With regard to lower extremity swelling it is difficult to tell whether her clot has propagated to a point where we would strongly recommend treatment  The patient has been resistant to treatment with anticoagulations at this time  If there is clot extending above the knee we would consider   Strongly anticoagulation     We have recommended stat ultrasound and Dopplers of the lower extremities    Patient will follow-up with current treatment plan

## 2021-09-01 NOTE — PROGRESS NOTES
Assessment/Plan:    Problem List Items Addressed This Visit        Genitourinary    Endometrial cancer [facility]) - Primary       Patient is very pleasant 78-year-old female with a history of high-grade stage III B 1 endometrial adenocarcinoma status post robotic hysterectomy and now 2 cycles of carboplatin paclitaxel  The patient has symptoms of abdominal bloating although her exam is nonfocal she does have some abdominal distension  Her blood work is stable for continued treatment at present dosage  However her  is also elevated  We have recommended a repeat  and CT scan after this current cycle  We will continue present treatment at present dosage as her biochemical parameters are tolerant of this  With regard to lower extremity swelling it is difficult to tell whether her clot has propagated to a point where we would strongly recommend treatment  The patient has been resistant to treatment with anticoagulations at this time  If there is clot extending above the knee we would consider   Strongly anticoagulation  We have recommended stat ultrasound and Dopplers of the lower extremities    Patient will follow-up with current treatment plan         Relevant Orders    VAS lower limb venous duplex study, complete bilateral    CT chest abdomen pelvis wo contrast         additionally patient has requested temporary placard for handicap license plate  Given that she is currently walking with 2 canes and has difficulty walking more than 200 ft we have signed this today      CHIEF COMPLAINT:   Consideration of cycle 3 carboplatin paclitaxel for stage IIIC1 endometrial adenocarcinoma      Problem:  Cancer Staging  Endometrial cancer ([facility] [address] )  Staging form: Corpus Uteri - Carcinoma, AJCC 8th Edition  - Clinical: FIGO Stage I (cT1, cN0, cM0) - Signed by Dmitri Bobo MD on 5/19/2021  - Pathologic: FIGO Stage IIIC1 (pT2, pN1, cM0) - Signed by Dmitri Bobo MD on 6/16/2021        Previous therapy:  Oncology History   Endometrial cancer ([facility] [address] )   5/15/2021 Initial Diagnosis    Endometrial cancer ([facility] [address] )     5/19/2021 -  Cancer Staged    Staging form: Corpus Uteri - Carcinoma, AJCC 8th Edition  - Clinical: FIGO Stage I (cT1, cN0, cM0) - Signed by Hiren Scott MD on 5/19/2021  Histologic grade (G): G3  Histologic grading system: 3 grade system       5/2021 Surgery      Robotically assisted total laparoscopic hysterectomy bilateral salpingo-oophorectomy and sentinel lymph node mapping and biopsy for stage III C1 grade 3 endometrial cancer with 17 of 18 mm invasion, cervical stromal involvement and bilateral positive nodes in the pelvis  Patient has also had small 2-5 mm pulmonary nodules which are too small to characterize as   Metastatic disease  The patient has a history of multiple pneumonias as a child       6/16/2021 -  Cancer Staged    Staging form: Corpus Uteri - Carcinoma, AJCC 8th Edition  - Pathologic: FIGO Stage IIIC1 (pT2, pN1, cM0) - Signed by Hiren Scott MD on 6/16/2021  Histologic grade (G): G3  Histologic grading system: 3 grade system  Residual tumor (R): R0 - None       7/19/2021 -  Chemotherapy    palonosetron (ALOXI), 0 25 mg, Intravenous, Once, 2 of 6 cycles  Administration: 0 25 mg (7/19/2021), 0 25 mg (8/16/2021)  fosaprepitant (EMEND) IVPB, 150 mg, Intravenous, Once, 2 of 6 cycles  Administration: 150 mg (7/19/2021), 150 mg (8/16/2021)  CARBOplatin (PARAPLATIN) IVPB ([facility] AUC DOSING), 436 mg, Intravenous, Once, 2 of 6 cycles  Administration: 436 mg (7/19/2021), 280 4 mg (8/16/2021)  PACLItaxel (TAXOL) chemo IVPB, 135 mg/m2 = 220 2 mg (77 1 % of original dose 175 mg/m2), Intravenous, Once, 2 of 6 cycles  Dose modification: 135 mg/m2 (original dose 175 mg/m2, Cycle 1, Reason: Dose modified as per discussion with consulting physician), 110 mg/m2 (original dose 175 mg/m2, Cycle 2, Reason: Dose Not Tolerated, Comment: Neuropathy and pain)  Administration: 220 2 mg (7/19/2021), 177 mg (8/16/2021)     8/2021 Genomic Testing    Insight testing performed  This reveals KRAS mutation, RADr1c (data for 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 of olaparib) PDL-1 for a tumor proportions score of 70%           Patient ID: Anali Medina is a 66 y o  female   Patient is very pleasant 78-year-old female with history of stage IIIC 1 endometrial adenocarcinoma status post  Robotically assisted total laparoscopic hysterectomy bilateral salpingo-oophorectomy and sentinel lymph node mapping and biopsies for high-grade endometrial cancer  She has undergone 2 cycles of carboplatin paclitaxel therapy and presents in consideration of her 3rd  I have reviewed the patient's weekly CBC and CPM P data  They are stable for consideration of cycle 3  The patient's  has risen  To 189 from 68  The patient's hematologic parameters are unremarkable  The patient's biochemical profile as indicate a slight elevation in alk-phos and a slight reduction in sodium  Today, the patient is doing well  She denies significant abdominal pain, pelvic pain, nausea, vomiting, constipation, diarrhea, fevers, chills, or vaginal bleeding  She does note some abdominal bloating  Intermittent diarrhea after her last treatment  She does note some continued leg swelling for which she is wearing compression stockings  She does have a 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  The following portions of the patient's history were reviewed and updated as appropriate: allergies, current medications, past family history, past social history, past surgical history and problem list     Review of Systems   Constitutional: Negative  HENT: Negative  Eyes: Negative  Respiratory: Negative  Cardiovascular: Negative  Gastrointestinal: Positive for abdominal distention  Endocrine: Negative  Genitourinary: Negative  Musculoskeletal: Negative  Skin: Negative  Neurological: Negative      Hematological: Negative  Psychiatric/Behavioral: Negative  Current Outpatient Medications   Medication Sig Dispense Refill    acetaminophen (TYLENOL) 500 mg tablet Take 500 mg by mouth every 6 (six) hours as needed for mild pain      Ascorbic Acid (vitamin C) 1000 MG tablet Take 1,000 mg by mouth daily      azelastine (OPTIVAR) 0 05 % ophthalmic solution Administer 1 drop to both eyes daily as needed      Calcium Carb-Cholecalciferol (CALCIUM 1000 + D PO) Take 2,000 Units by mouth        Cholecalciferol (Vitamin D3) 25 MCG (1000 UT) CAPS Take 1 capsule by mouth 2 (two) times a day      Cranberry 1000 MG CAPS Take by mouth      diphenhydrAMINE HCl (BENADRYL ALLERGY PO) Take by mouth      LORazepam (ATIVAN) 1 mg tablet Take 1 tablet (1 mg total) by mouth every 6 (six) hours as needed for anxiety (or nausea) 36 tablet 1    LUTEIN PO Take 1 tablet by mouth daily      methylPREDNISolone 4 MG tablet therapy pack Use as directed on package 21 tablet 0    metroNIDAZOLE (METROCREAM) 0 75 % cream Apply topically 2 (two) times a day      naloxone (NARCAN) 4 mg/0 1 mL nasal spray Administer 1 spray into a nostril  If no response after 2-3 minutes, give another dose in the other nostril using a new spray  1 each 1    nitrofurantoin (MACROBID) 100 mg capsule Take 1 capsule (100 mg total) by mouth 2 (two) times a day 10 capsule 0    ondansetron (ZOFRAN) 8 mg tablet Take 1 tablet (8 mg total) by mouth every 8 (eight) hours as needed for nausea or vomiting 30 tablet 1    oxyCODONE (ROXICODONE) 5 mg immediate release tablet 1 -2 tabs by mouth every 4-6 hour as needed 10 tablet 0    Probiotic Product (CULTURELLE PROBIOTICS PO) Take by mouth      vitamin E, tocopherol, 1,000 units capsule Take 1,000 Units by mouth daily      VITAMIN K PO Take 50 mcg by mouth daily       Vitamins A & D (VITAMIN A & D PO) Take by mouth       No current facility-administered medications for this visit             Objective:    Blood pressure 122/72, pulse 93, temperature (!) 97 4 °F (36 3 °C), resp  rate 16, height 5' 4 57" (1 64 m), weight 58 3 kg (128 lb 8 oz)  Body mass index is 21 67 kg/m²  Body surface area is 1 63 meters squared  Physical Exam  Constitutional:       Appearance: She is well-developed  HENT:      Head: Normocephalic and atraumatic  Neck:      Thyroid: No thyromegaly  Cardiovascular:      Rate and Rhythm: Normal rate and regular rhythm  Heart sounds: Normal heart sounds  Pulmonary:      Effort: Pulmonary effort is normal       Breath sounds: Normal breath sounds  Abdominal:      General: Bowel sounds are normal  There is distension  Palpations: Abdomen is soft  Comments: Well healed laparoscopic incisions  Genitourinary:     Comments: deferred    Musculoskeletal:         General: Normal range of motion  Cervical back: Normal range of motion and neck supple  Lymphadenopathy:      Cervical: No cervical adenopathy  Skin:     General: Skin is warm and dry  Neurological:      Mental Status: She is alert and oriented to person, place, and time     Psychiatric:         Behavior: Behavior normal          Lab Results   Component Value Date     189 3 (H) 08/26/2021     Lab Results   Component Value Date    K 4 1 08/26/2021    CL 97 (L) 08/26/2021    CO2 29 08/26/2021    BUN 19 08/26/2021    CREATININE 0 90 08/26/2021    GLUF 85 08/26/2021    CALCIUM 8 7 08/26/2021    CORRECTEDCA 9 7 08/26/2021    AST 36 08/26/2021    ALT 41 08/26/2021    ALKPHOS 121 (H) 08/26/2021    EGFR 64 08/26/2021     Lab Results   Component Value Date    WBC 4 39 08/26/2021    HGB 11 2 (L) 08/26/2021    HCT 34 1 (L) 08/26/2021    MCV 83 08/26/2021     08/26/2021     Lab Results   Component Value Date    NEUTROABS 2 78 08/26/2021        Trend:  Lab Results   Component Value Date     189 3 (H) 08/26/2021     63 6 (H) 08/05/2021     71 0 (H) 07/12/2021

## 2021-09-02 ENCOUNTER — HOSPITAL ENCOUNTER (OUTPATIENT)
Dept: CT IMAGING | Facility: HOSPITAL | Age: 71
Discharge: HOME | End: 2021-09-02
Payer: MEDICARE

## 2021-09-02 ENCOUNTER — HOSPITAL ENCOUNTER (OUTPATIENT)
Dept: INFUSION CENTER | Facility: CLINIC | Age: 71
Discharge: HOME | End: 2021-09-02
Payer: MEDICARE

## 2021-09-02 ENCOUNTER — TELEPHONE (OUTPATIENT)
Dept: GYNECOLOGIC ONCOLOGY | Facility: CLINIC | Age: 71
End: 2021-09-02

## 2021-09-02 ENCOUNTER — DOCUMENTATION (OUTPATIENT)
Dept: HEMATOLOGY ONCOLOGY | Facility: CLINIC | Age: 71
End: 2021-09-02

## 2021-09-02 DIAGNOSIS — R10.84 GENERALIZED ABDOMINAL PAIN: ICD-10-CM

## 2021-09-02 DIAGNOSIS — R14.0 ABDOMINAL DISTENSION: ICD-10-CM

## 2021-09-02 DIAGNOSIS — Z45.2 ENCOUNTER FOR CENTRAL LINE CARE: ICD-10-CM

## 2021-09-02 DIAGNOSIS — C54.1 ENDOMETRIAL CANCER (HCC): ICD-10-CM

## 2021-09-02 DIAGNOSIS — C54.1 ENDOMETRIAL CANCER (HCC): Primary | ICD-10-CM

## 2021-09-02 LAB
ALBUMIN SERPL BCP-MCNC: 2.5 G/DL (ref 3.5–5)
ALP SERPL-CCNC: 150 U/L (ref 46–116)
ALT SERPL W P-5'-P-CCNC: 23 U/L (ref 12–78)
ANION GAP SERPL CALCULATED.3IONS-SCNC: 9 MMOL/L (ref 4–13)
AST SERPL W P-5'-P-CCNC: 31 U/L (ref 5–45)
BASOPHILS # BLD AUTO: 0.04 THOUSANDS/ΜL (ref 0–0.1)
BASOPHILS NFR BLD AUTO: 1 % (ref 0–1)
BILIRUB SERPL-MCNC: 0.24 MG/DL (ref 0.2–1)
BUN SERPL-MCNC: 17 MG/DL (ref 5–25)
CALCIUM ALBUM COR SERPL-MCNC: 9.8 MG/DL (ref 8.3–10.1)
CALCIUM SERPL-MCNC: 8.6 MG/DL (ref 8.3–10.1)
CHLORIDE SERPL-SCNC: 98 MMOL/L (ref 100–108)
CO2 SERPL-SCNC: 27 MMOL/L (ref 21–32)
CREAT SERPL-MCNC: 1.05 MG/DL (ref 0.6–1.3)
EOSINOPHIL # BLD AUTO: 0.04 THOUSAND/ΜL (ref 0–0.61)
EOSINOPHIL NFR BLD AUTO: 1 % (ref 0–6)
ERYTHROCYTE [DISTWIDTH] IN BLOOD BY AUTOMATED COUNT: 13.3 % (ref 11.6–15.1)
GFR SERPL CREATININE-BSD FRML MDRD: 53 ML/MIN/1.73SQ M
GLUCOSE SERPL-MCNC: 84 MG/DL (ref 65–140)
HCT VFR BLD AUTO: 31.2 % (ref 34.8–46.1)
HGB BLD-MCNC: 10.1 G/DL (ref 11.5–15.4)
IMM GRANULOCYTES # BLD AUTO: 0.02 THOUSAND/UL (ref 0–0.2)
IMM GRANULOCYTES NFR BLD AUTO: 1 % (ref 0–2)
LYMPHOCYTES # BLD AUTO: 1.1 THOUSANDS/ΜL (ref 0.6–4.47)
LYMPHOCYTES NFR BLD AUTO: 28 % (ref 14–44)
MAGNESIUM SERPL-MCNC: 1.9 MG/DL (ref 1.6–2.6)
MCH RBC QN AUTO: 27.3 PG (ref 26.8–34.3)
MCHC RBC AUTO-ENTMCNC: 32.4 G/DL (ref 31.4–37.4)
MCV RBC AUTO: 84 FL (ref 82–98)
MONOCYTES # BLD AUTO: 0.76 THOUSAND/ΜL (ref 0.17–1.22)
MONOCYTES NFR BLD AUTO: 19 % (ref 4–12)
NEUTROPHILS # BLD AUTO: 1.97 THOUSANDS/ΜL (ref 1.85–7.62)
NEUTS SEG NFR BLD AUTO: 50 % (ref 43–75)
NRBC BLD AUTO-RTO: 0 /100 WBCS
PLATELET # BLD AUTO: 200 THOUSANDS/UL (ref 149–390)
PMV BLD AUTO: 9 FL (ref 8.9–12.7)
POTASSIUM SERPL-SCNC: 4 MMOL/L (ref 3.5–5.3)
PROT SERPL-MCNC: 6.3 G/DL (ref 6.4–8.2)
RBC # BLD AUTO: 3.7 MILLION/UL (ref 3.81–5.12)
SODIUM SERPL-SCNC: 134 MMOL/L (ref 136–145)
WBC # BLD AUTO: 3.93 THOUSAND/UL (ref 4.31–10.16)

## 2021-09-02 PROCEDURE — 83735 ASSAY OF MAGNESIUM: CPT

## 2021-09-02 PROCEDURE — 74176 CT ABD & PELVIS W/O CONTRAST: CPT

## 2021-09-02 PROCEDURE — 71250 CT THORAX DX C-: CPT

## 2021-09-02 PROCEDURE — 80053 COMPREHEN METABOLIC PANEL: CPT

## 2021-09-02 PROCEDURE — G1004 CDSM NDSC: HCPCS

## 2021-09-02 PROCEDURE — 85025 COMPLETE CBC W/AUTO DIFF WBC: CPT

## 2021-09-02 NOTE — TELEPHONE ENCOUNTER
Spoke to [employer] her Ct scan was moved to today at 3 pm pt was informed and she will be in [facility] today she was informed not to eat or drink anything until after the test                ----- Message from [employer] sent at 9/2/2021  1:27 PM EDT -----  Please move her CT scan on 9/13 to next available/ASAP as she is having discomfort  I can change to stat if needed

## 2021-09-02 NOTE — PROGRESS NOTES
9-1-21  vd new oral chemo start- Xarelto // Per email received patient has $300 copay and requested assistance if possible  9-2-21  Rcvd email from providers office stating medication has been changed from Xarelto to Eliquis  Email states patient will pay the copay today however is requesting assistance  Call to patient, discussed funding and what documents would be required for submission  Patient stated she will gather information and send them to me  [employer] Application completed and forwarded to provider for signature  9-8-21  Follow up call to patient regarding assistance application and information needed  Patient requested I send her an email of what is needed  Email sent  9-16-21  Follow up call to patient regarding application for assistance no response   Left message

## 2021-09-02 NOTE — PROGRESS NOTES
Pt presented for central labs, offering no complaints  Port was accessed and labs were drawn without incident  Port was flushed and de-accessed  Pt appt reviewed, avs declined and pt was discharged in stable condition

## 2021-09-02 NOTE — TELEPHONE ENCOUNTER
Patient called to report worsening abdominal distention which was noted at her office visit yesterday with Dr Carmencita Toribio  She has generalized, worsening discomfort in her abdomen and feels that it is growing larger  She has early satiety  Currently has a CT c/a/p scheduled for 9/13, however, we will expedite this given patient's worsening symptoms

## 2021-09-03 ENCOUNTER — HOSPITAL ENCOUNTER (EMERGENCY)
Facility: HOSPITAL | Age: 71
Discharge: HOME | End: 2021-09-03
Attending: EMERGENCY MEDICINE
Payer: MEDICARE

## 2021-09-03 ENCOUNTER — TELEPHONE (OUTPATIENT)
Dept: GYNECOLOGIC ONCOLOGY | Facility: CLINIC | Age: 71
End: 2021-09-03

## 2021-09-03 VITALS
RESPIRATION RATE: 18 BRPM | TEMPERATURE: 98.7 F | OXYGEN SATURATION: 99 % | SYSTOLIC BLOOD PRESSURE: 189 MMHG | HEART RATE: 90 BPM | DIASTOLIC BLOOD PRESSURE: 88 MMHG

## 2021-09-03 DIAGNOSIS — R18.8 ABDOMINAL ASCITES: Primary | ICD-10-CM

## 2021-09-03 PROCEDURE — 99284 EMERGENCY DEPT VISIT MOD MDM: CPT | Performed by: EMERGENCY MEDICINE

## 2021-09-03 PROCEDURE — 99283 EMERGENCY DEPT VISIT LOW MDM: CPT

## 2021-09-03 NOTE — DISCHARGE INSTRUCTIONS
If you have continued discomfort, go to [facility] tomorrow morning to try and have paracentesis with IR there

## 2021-09-03 NOTE — ED PROVIDER NOTES
History  Chief Complaint   Patient presents with    Abdominal Problem     pt reports "fluid in abdomen" increasing distention, starting thursday, has had out pt CT showing fluid, sent by cancer dr       History provided by:  Patient  Abdominal Cramping  Pain location:  Generalized  Pain quality: bloating    Pain radiates to:  Does not radiate  Pain severity:  Moderate  Onset quality:  Gradual  Duration:  1 week  Timing:  Constant  Progression:  Worsening  Chronicity:  New  Context: previous surgery (Had abd hysterectomy in June for endometrial cancer  Has started chemo  REcently diagnosed with leg DVT and just started eliquis  Abd distention and bloating over past week)    Relieved by:  Nothing  Worsened by:  Nothing  Ineffective treatments:  None tried  Associated symptoms: anorexia and shortness of breath    Associated symptoms: no cough, no diarrhea, no dysuria, no fatigue and no vomiting    Associated symptoms comment:  Gyn/onc called and tried to get her an appointment today with IR to drain ascites on CT but they didn't hear from anyone so told her to go to the ED  Prior to Admission Medications   Prescriptions Last Dose Informant Patient Reported? Taking?    Ascorbic Acid (vitamin C) 1000 MG tablet  Self Yes No   Sig: Take 1,000 mg by mouth daily   Calcium Carb-Cholecalciferol (CALCIUM 1000 + D PO)  Self Yes No   Sig: Take 2,000 Units by mouth     Cholecalciferol (Vitamin D3) 25 MCG (1000 UT) CAPS  Self Yes No   Sig: Take 1 capsule by mouth 2 (two) times a day   Cranberry 1000 MG CAPS  Self Yes No   Sig: Take by mouth   LORazepam (ATIVAN) 1 mg tablet  Self No No   Sig: Take 1 tablet (1 mg total) by mouth every 6 (six) hours as needed for anxiety (or nausea)   LUTEIN PO  Self Yes No   Sig: Take 1 tablet by mouth daily   Probiotic Product (CULTURELLE PROBIOTICS PO)  Self Yes No   Sig: Take by mouth   VITAMIN K PO  Self Yes No   Sig: Take 50 mcg by mouth daily    Vitamins A & D (VITAMIN A & D PO)  Self Yes No   Sig: Take by mouth   acetaminophen (TYLENOL) 500 mg tablet  Self Yes No   Sig: Take 500 mg by mouth every 6 (six) hours as needed for mild pain   apixaban (ELIQUIS) 5 mg   No No   Sig: Take 2 tablets (10 mg total) by mouth 2 (two) times a day for 7 days, THEN 1 tablet (5 mg total) 2 (two) times a day for 23 days  azelastine (OPTIVAR) 0 05 % ophthalmic solution  Self Yes No   Sig: Administer 1 drop to both eyes daily as needed   diphenhydrAMINE HCl (BENADRYL ALLERGY PO)  Self Yes No   Sig: Take by mouth   methylPREDNISolone 4 MG tablet therapy pack  Self No No   Sig: Use as directed on package   metroNIDAZOLE (METROCREAM) 0 75 % cream  Self Yes No   Sig: Apply topically 2 (two) times a day   naloxone (NARCAN) 4 mg/0 1 mL nasal spray  Self No No   Sig: Administer 1 spray into a nostril  If no response after 2-3 minutes, give another dose in the other nostril using a new spray     nitrofurantoin (MACROBID) 100 mg capsule  Self No No   Sig: Take 1 capsule (100 mg total) by mouth 2 (two) times a day   ondansetron (ZOFRAN) 8 mg tablet  Self No No   Sig: Take 1 tablet (8 mg total) by mouth every 8 (eight) hours as needed for nausea or vomiting   oxyCODONE (ROXICODONE) 5 mg immediate release tablet  Self No No   Si -2 tabs by mouth every 4-6 hour as needed   vitamin E, tocopherol, 1,000 units capsule  Self Yes No   Sig: Take 1,000 Units by mouth daily      Facility-Administered Medications: None       Past Medical History:   Diagnosis Date    Breast cancer ([facility] [address] )     History of transfusion 2018    PONV (postoperative nausea and vomiting)        Past Surgical History:   Procedure Laterality Date    BREAST LUMPECTOMY Right 2007    COLONOSCOPY      DILATION AND CURETTAGE OF UTERUS      FEMUR SURGERY Right 2018    FL GUIDED CENTRAL VENOUS ACCESS DEVICE INSERTION  2021    HYSTERECTOMY      LA INSJ TUNNELED CTR VAD W/SUBQ PORT AGE 5 YR/> N/A 2021    Procedure: INSERTION VENOUS PORT ( PORT-A-CATH) IR; Surgeon: Doe Ortiz DO;  [facility];  Service: Interventional Radiology    NM LAPAROSCOPY W TOT HYSTERECTUTERUS <=250 GRAM  W TUBE/OVARY N/A 6/3/2021    Procedure: ROBOT ASSISTED TOTAL LAPAROSCOPIC HYSTERECTOMY, BILATERAL SALPINGO-OOPHORECTOMY, SENTINEL LYMPH NODE MAPPING AND BIOPSY;  Surgeon: Gaby Milan MD;  [facility];  Service: Gynecology Oncology    TUBAL LIGATION         Family History   Problem Relation Age of Onset    Ovarian cancer Neg Hx     Cervical cancer Neg Hx     Uterine cancer Neg Hx      I have reviewed and agree with the history as documented  E-Cigarette/Vaping    E-Cigarette Use Never User      E-Cigarette/Vaping Substances    Nicotine No     THC No     CBD No     Flavoring No     Other No     Unknown No      Social History     Tobacco Use    Smoking status: Never Smoker    Smokeless tobacco: Never Used   Vaping Use    Vaping Use: Never used   Substance Use Topics    Alcohol use: Yes     Alcohol/week: 1 0 standard drinks     Types: 1 Glasses of wine per week    Drug use: No       Review of Systems   Constitutional: Negative for fatigue  Respiratory: Positive for shortness of breath  Negative for cough  Gastrointestinal: Positive for anorexia  Negative for diarrhea and vomiting  Genitourinary: Negative for dysuria  All other systems reviewed and are negative  Physical Exam  Physical Exam  Vitals reviewed  Constitutional:       Appearance: She is normal weight  HENT:      Head: Normocephalic and atraumatic  Eyes:      Extraocular Movements: Extraocular movements intact  Pupils: Pupils are equal, round, and reactive to light  Cardiovascular:      Rate and Rhythm: Normal rate  Pulmonary:      Effort: Pulmonary effort is normal    Abdominal:      General: There is distension  Palpations: Abdomen is soft  Tenderness: There is abdominal tenderness (mild diffuse)  Skin:     General: Skin is warm  Coloration: Skin is not jaundiced  Neurological:      General: No focal deficit present  Mental Status: She is alert  Psychiatric:         Mood and Affect: Mood normal          Vital Signs  ED Triage Vitals [09/03/21 1659]   Temperature Pulse Respirations Blood Pressure SpO2   98 7 °F (37 1 °C) 90 18 (!) 189/88 99 %      Temp Source Heart Rate Source Patient Position - Orthostatic VS BP Location FiO2 (%)   Oral Monitor Sitting Left arm --      Pain Score       7           Vitals:    09/03/21 1659   BP: (!) 189/88   Pulse: 90   Patient Position - Orthostatic VS: Sitting         Visual Acuity      ED Medications  Medications - No data to display    Diagnostic Studies  Results Reviewed     None                 No orders to display              Procedures  Procedures         ED Course  ED Course as of Sep 03 2001   Fri Sep 03, 2021   1811 Text sent                                               MDM  Number of Diagnoses or Management Options  Abdominal ascites: new and does not require workup  Diagnosis management comments: Offered pt to do the paracentesis myself to give her some relief since it is after hours and IR no longer at [facility]  Pt would like to see if it can be done with IR before Tuesday since she thinks with her bloating/pressure/nausea  Talked with on call IR doctor who is on all weekend  If she would like to try and have it done by IR before Tues best option would be to go to [facility] likely earlier in the morning tomorrow and he will try and fit her in  Offered again to tap her here vs IR at [facility] and pt wants to try and got to [facility] tomorrow       Patient Progress  Patient progress: stable      Disposition  Final diagnoses:   Abdominal ascites     Time reflects when diagnosis was documented in both MDM as applicable and the Disposition within this note     Time User Action Codes Description Comment    9/3/2021  6:32 PM Alvin Mcgarry 89, [address] [R18 [id number]] Abdominal ascites       ED Disposition     ED Disposition Condition Date/Time Comment    Discharge Stable Fri Sep 3, 2021  6:32 PM Katlyn Gordon discharge to home/self care  Follow-up Information     Follow up With Specialties Details Why Contact Info Additional [address] [facility] Emergency Department Emergency Medicine Go to  If symptoms worsen [address] Emergency Department, [address] [place] [address], [id number]          Discharge Medication List as of 9/3/2021  6:36 PM      CONTINUE these medications which have NOT CHANGED    Details   acetaminophen (TYLENOL) 500 mg tablet Take 500 mg by mouth every 6 (six) hours as needed for mild pain, Historical Med      apixaban (ELIQUIS) 5 mg Multiple Dosages:Starting Wed 9/1/2021, Until Tue 9/7/2021 at 2359, THEN Starting Wed 9/8/2021, Until Thu 9/30/2021 at 2359Take 2 tablets (10 mg total) by mouth 2 (two) times a day for 7 days, THEN 1 tablet (5 mg total) 2 (two) times a day for 23 days   , Normal      Ascorbic Acid (vitamin C) 1000 MG tablet Take 1,000 mg by mouth daily, Historical Med      azelastine (OPTIVAR) 0 05 % ophthalmic solution Administer 1 drop to both eyes daily as needed, Historical Med      Calcium Carb-Cholecalciferol (CALCIUM 1000 + D PO) Take 2,000 Units by mouth  , Historical Med      Cholecalciferol (Vitamin D3) 25 MCG (1000 UT) CAPS Take 1 capsule by mouth 2 (two) times a day, Historical Med      Cranberry 1000 MG CAPS Take by mouth, Historical Med      diphenhydrAMINE HCl (BENADRYL ALLERGY PO) Take by mouth, Historical Med      LORazepam (ATIVAN) 1 mg tablet Take 1 tablet (1 mg total) by mouth every 6 (six) hours as needed for anxiety (or nausea), Starting Wed 7/7/2021, Normal      LUTEIN PO Take 1 tablet by mouth daily, Historical Med      methylPREDNISolone 4 MG tablet therapy pack Use as directed on package, Normal      metroNIDAZOLE (METROCREAM) 0 75 % cream Apply topically 2 (two) times a day, Historical Med      naloxone (NARCAN) 4 mg/0 1 mL nasal spray Administer 1 spray into a nostril  If no response after 2-3 minutes, give another dose in the other nostril using a new spray , Normal      nitrofurantoin (MACROBID) 100 mg capsule Take 1 capsule (100 mg total) by mouth 2 (two) times a day, Starting Tue 8/10/2021, Normal      ondansetron (ZOFRAN) 8 mg tablet Take 1 tablet (8 mg total) by mouth every 8 (eight) hours as needed for nausea or vomiting, Starting Wed 7/7/2021, Normal      oxyCODONE (ROXICODONE) 5 mg immediate release tablet 1 -2 tabs by mouth every 4-6 hour as needed, Normal      Probiotic Product (CULTURELLE PROBIOTICS PO) Take by mouth, Historical Med      vitamin E, tocopherol, 1,000 units capsule Take 1,000 Units by mouth daily, Historical Med      VITAMIN K PO Take 50 mcg by mouth daily , Historical Med      Vitamins A & D (VITAMIN A & D PO) Take by mouth, Historical Med           No discharge procedures on file      PDMP Review       Value Time User    PDMP Reviewed  Yes 7/7/2021 10:06 AM LAUREL Vega          ED Provider  Electronically Signed by           Nuris Bahena MD  09/03/21 2002

## 2021-09-03 NOTE — TELEPHONE ENCOUNTER
STAT paracentesis order was entered this morning for patient, but no one reached out today to schedule her  She is uncomfortable and unable to make it through the holiday weekend  No one is answering at this hour at the IR scheduling line  Advised her in this case to go to the ED for paracentesis  In the event she decides to wait until Tuesday, I sent a high-priority message to IR scheduling that she needs to be scheduled for Tuesday

## 2021-09-04 ENCOUNTER — APPOINTMENT (OUTPATIENT)
Dept: RADIOLOGY | Facility: HOSPITAL | Age: 71
End: 2021-09-04
Payer: MEDICARE

## 2021-09-04 ENCOUNTER — APPOINTMENT (EMERGENCY)
Dept: RADIOLOGY | Facility: HOSPITAL | Age: 71
End: 2021-09-04
Payer: MEDICARE

## 2021-09-04 ENCOUNTER — HOSPITAL ENCOUNTER (OUTPATIENT)
Facility: HOSPITAL | Age: 71
Setting detail: OBSERVATION
Discharge: HOME | End: 2021-09-05
Attending: EMERGENCY MEDICINE | Admitting: INTERNAL MEDICINE
Payer: MEDICARE

## 2021-09-04 DIAGNOSIS — R18.8 ASCITES: Primary | ICD-10-CM

## 2021-09-04 DIAGNOSIS — I82.409 DVT (DEEP VENOUS THROMBOSIS) (HCC): ICD-10-CM

## 2021-09-04 DIAGNOSIS — R14.0 ABDOMINAL DISTENSION: ICD-10-CM

## 2021-09-04 DIAGNOSIS — R06.02 SHORTNESS OF BREATH: ICD-10-CM

## 2021-09-04 LAB
ALBUMIN FLD-MCNC: 1.8 G/DL
ALBUMIN SERPL BCP-MCNC: 2.3 G/DL (ref 3.5–5)
ALP SERPL-CCNC: 167 U/L (ref 46–116)
ALT SERPL W P-5'-P-CCNC: 27 U/L (ref 12–78)
ANION GAP SERPL CALCULATED.3IONS-SCNC: 5 MMOL/L (ref 4–13)
AST SERPL W P-5'-P-CCNC: 38 U/L (ref 5–45)
BASOPHILS # BLD AUTO: 0.05 THOUSANDS/ΜL (ref 0–0.1)
BASOPHILS NFR BLD AUTO: 1 % (ref 0–1)
BASOPHILS NFR FLD MANUAL: 3 %
BILIRUB SERPL-MCNC: 0.26 MG/DL (ref 0.2–1)
BUN SERPL-MCNC: 15 MG/DL (ref 5–25)
CALCIUM ALBUM COR SERPL-MCNC: 10.4 MG/DL (ref 8.3–10.1)
CALCIUM SERPL-MCNC: 9 MG/DL (ref 8.3–10.1)
CHLORIDE SERPL-SCNC: 99 MMOL/L (ref 100–108)
CO2 SERPL-SCNC: 28 MMOL/L (ref 21–32)
CREAT SERPL-MCNC: 0.86 MG/DL (ref 0.6–1.3)
EOSINOPHIL # BLD AUTO: 0.05 THOUSAND/ΜL (ref 0–0.61)
EOSINOPHIL NFR BLD AUTO: 1 % (ref 0–6)
ERYTHROCYTE [DISTWIDTH] IN BLOOD BY AUTOMATED COUNT: 13.3 % (ref 11.6–15.1)
GFR SERPL CREATININE-BSD FRML MDRD: 68 ML/MIN/1.73SQ M
GLUCOSE FLD-MCNC: 24 MG/DL
GLUCOSE SERPL-MCNC: 91 MG/DL (ref 65–140)
HCT VFR BLD AUTO: 39.7 % (ref 34.8–46.1)
HGB BLD-MCNC: 12.9 G/DL (ref 11.5–15.4)
HISTIOCYTES NFR FLD: 17 %
IMM GRANULOCYTES # BLD AUTO: 0.04 THOUSAND/UL (ref 0–0.2)
IMM GRANULOCYTES NFR BLD AUTO: 1 % (ref 0–2)
LDH FLD L TO P-CCNC: 1933 U/L
LYMPHOCYTES # BLD AUTO: 1.12 THOUSANDS/ΜL (ref 0.6–4.47)
LYMPHOCYTES NFR BLD AUTO: 17 % (ref 14–44)
LYMPHOCYTES NFR BLD AUTO: 41 %
MCH RBC QN AUTO: 27.2 PG (ref 26.8–34.3)
MCHC RBC AUTO-ENTMCNC: 32.5 G/DL (ref 31.4–37.4)
MCV RBC AUTO: 84 FL (ref 82–98)
MONOCYTES # BLD AUTO: 0.98 THOUSAND/ΜL (ref 0.17–1.22)
MONOCYTES NFR BLD AUTO: 15 % (ref 4–12)
MONOCYTES NFR BLD AUTO: 34 %
NEUTROPHILS # BLD AUTO: 4.52 THOUSANDS/ΜL (ref 1.85–7.62)
NEUTS SEG NFR BLD AUTO: 3 %
NEUTS SEG NFR BLD AUTO: 65 % (ref 43–75)
NRBC BLD AUTO-RTO: 0 /100 WBCS
PLATELET # BLD AUTO: 269 THOUSANDS/UL (ref 149–390)
PMV BLD AUTO: 9.3 FL (ref 8.9–12.7)
POTASSIUM SERPL-SCNC: 3.9 MMOL/L (ref 3.5–5.3)
PROT FLD-MCNC: 4.4 G/DL
PROT SERPL-MCNC: 6.7 G/DL (ref 6.4–8.2)
RBC # BLD AUTO: 4.75 MILLION/UL (ref 3.81–5.12)
SODIUM SERPL-SCNC: 132 MMOL/L (ref 136–145)
TOTAL CELLS COUNTED SPEC: 29
TROPONIN I SERPL-MCNC: <0.02 NG/ML
WBC # BLD AUTO: 6.76 THOUSAND/UL (ref 4.31–10.16)
WBC # FLD MANUAL: 816 /UL

## 2021-09-04 PROCEDURE — 99220 PR INITIAL OBSERVATION CARE/DAY 70 MINUTES: CPT | Performed by: INTERNAL MEDICINE

## 2021-09-04 PROCEDURE — 83615 LACTATE (LD) (LDH) ENZYME: CPT

## 2021-09-04 PROCEDURE — 36415 COLL VENOUS BLD VENIPUNCTURE: CPT

## 2021-09-04 PROCEDURE — 78580 LUNG PERFUSION IMAGING: CPT

## 2021-09-04 PROCEDURE — 87070 CULTURE OTHR SPECIMN AEROBIC: CPT

## 2021-09-04 PROCEDURE — 99285 EMERGENCY DEPT VISIT HI MDM: CPT

## 2021-09-04 PROCEDURE — G1004 CDSM NDSC: HCPCS

## 2021-09-04 PROCEDURE — 71046 X-RAY EXAM CHEST 2 VIEWS: CPT

## 2021-09-04 PROCEDURE — 89051 BODY FLUID CELL COUNT: CPT

## 2021-09-04 PROCEDURE — 88112 CYTOPATH CELL ENHANCE TECH: CPT | Performed by: PATHOLOGY

## 2021-09-04 PROCEDURE — 88305 TISSUE EXAM BY PATHOLOGIST: CPT | Performed by: PATHOLOGY

## 2021-09-04 PROCEDURE — 87205 SMEAR GRAM STAIN: CPT

## 2021-09-04 PROCEDURE — 82945 GLUCOSE OTHER FLUID: CPT

## 2021-09-04 PROCEDURE — 84484 ASSAY OF TROPONIN QUANT: CPT

## 2021-09-04 PROCEDURE — 80053 COMPREHEN METABOLIC PANEL: CPT

## 2021-09-04 PROCEDURE — 85025 COMPLETE CBC W/AUTO DIFF WBC: CPT

## 2021-09-04 PROCEDURE — 99284 EMERGENCY DEPT VISIT MOD MDM: CPT | Performed by: EMERGENCY MEDICINE

## 2021-09-04 PROCEDURE — 84157 ASSAY OF PROTEIN OTHER: CPT

## 2021-09-04 PROCEDURE — 93005 ELECTROCARDIOGRAM TRACING: CPT

## 2021-09-04 PROCEDURE — 49083 ABD PARACENTESIS W/IMAGING: CPT

## 2021-09-04 PROCEDURE — 82042 OTHER SOURCE ALBUMIN QUAN EA: CPT

## 2021-09-04 PROCEDURE — 49083 ABD PARACENTESIS W/IMAGING: CPT | Performed by: RADIOLOGY

## 2021-09-04 PROCEDURE — A9540 TC99M MAA: HCPCS

## 2021-09-04 RX ORDER — ACETAMINOPHEN 325 MG/1
650 TABLET ORAL EVERY 6 HOURS PRN
Status: DISCONTINUED | OUTPATIENT
Start: 2021-09-04 | End: 2021-09-05 | Stop reason: HOSPADM

## 2021-09-04 RX ORDER — MAGNESIUM HYDROXIDE/ALUMINUM HYDROXICE/SIMETHICONE 120; 1200; 1200 MG/30ML; MG/30ML; MG/30ML
30 SUSPENSION ORAL EVERY 4 HOURS PRN
Status: DISCONTINUED | OUTPATIENT
Start: 2021-09-04 | End: 2021-09-05 | Stop reason: HOSPADM

## 2021-09-04 RX ORDER — ONDANSETRON 2 MG/ML
4 INJECTION INTRAMUSCULAR; INTRAVENOUS EVERY 6 HOURS PRN
Status: DISCONTINUED | OUTPATIENT
Start: 2021-09-04 | End: 2021-09-05 | Stop reason: HOSPADM

## 2021-09-04 RX ORDER — LIDOCAINE WITH 8.4% SOD BICARB 0.9%(10ML)
SYRINGE (ML) INJECTION CODE/TRAUMA/SEDATION MEDICATION
Status: COMPLETED | OUTPATIENT
Start: 2021-09-04 | End: 2021-09-04

## 2021-09-04 RX ADMIN — APIXABAN 10 MG: 5 TABLET, FILM COATED ORAL at 15:57

## 2021-09-04 RX ADMIN — ONDANSETRON 4 MG: 2 INJECTION INTRAMUSCULAR; INTRAVENOUS at 17:49

## 2021-09-04 RX ADMIN — Medication 10 ML: at 10:30

## 2021-09-04 NOTE — ED NOTES
Pt transported to [address] [place] [address] Rd, [address] [facility]  09/04/21 1013

## 2021-09-04 NOTE — PLAN OF CARE
Problem: PAIN - ADULT  Goal: Verbalizes/displays adequate comfort level or baseline comfort level  Description: Interventions:  - Encourage patient to monitor pain and request assistance  - Assess pain using appropriate pain scale  - Administer analgesics based on type and severity of pain and evaluate response  - Implement non-pharmacological measures as appropriate and evaluate response  - Consider cultural and social influences on pain and pain management  - Notify physician/advanced practitioner if interventions unsuccessful or patient reports new pain  Outcome: Progressing     Problem: INFECTION - ADULT  Goal: Absence or prevention of progression during hospitalization  Description: INTERVENTIONS:  - Assess and monitor for signs and symptoms of infection  - Monitor lab/diagnostic results  - Monitor all insertion sites, i e  indwelling lines, tubes, and drains  - Monitor endotracheal if appropriate and nasal secretions for changes in amount and color  - [place] appropriate cooling/warming therapies per order  - Administer medications as ordered  - Instruct and encourage patient and family to use good hand hygiene technique  - Identify and instruct in appropriate isolation precautions for identified infection/condition  Outcome: Progressing  Goal: Absence of fever/infection during neutropenic period  Description: INTERVENTIONS:  - Monitor WBC    Outcome: Progressing     Problem: SAFETY ADULT  Goal: Patient will remain free of falls  Description: INTERVENTIONS:  - Educate patient/family on patient safety including physical limitations  - Instruct patient to call for assistance with activity   - Consult OT/PT to assist with strengthening/mobility   - Keep Call bell within reach  - Keep bed low and locked with side rails adjusted as appropriate  - Keep care items and personal belongings within reach  - Initiate and maintain comfort rounds  - Make Fall Risk Sign visible to staff  - Offer Toileting every 2 Hours, in advance of need  - Initiate/Maintain alarm  - Obtain necessary fall risk management equipment  - Apply yellow socks and bracelet for high fall risk patients  - Consider moving patient to room near nurses station  Outcome: Progressing  Goal: Maintain or return to baseline ADL function  Description: INTERVENTIONS:  -  Assess patient's ability to carry out ADLs; assess patient's baseline for ADL function and identify physical deficits which impact ability to perform ADLs (bathing, care of mouth/teeth, toileting, grooming, dressing, etc )  - Assess/evaluate cause of self-care deficits   - Assess range of motion  - Assess patient's mobility; develop plan if impaired  - Assess patient's need for assistive devices and provide as appropriate  - Encourage maximum independence but intervene and supervise when necessary  - Involve family in performance of ADLs  - Assess for home care needs following discharge   - Consider OT consult to assist with ADL evaluation and planning for discharge  - Provide patient education as appropriate  Outcome: Progressing  Goal: Maintains/Returns to pre admission functional level  Description: INTERVENTIONS:  - Perform BMAT or MOVE assessment daily    - Set and communicate daily mobility goal to care team and patient/family/caregiver  - Collaborate with rehabilitation services on mobility goals if consulted  - Perform Range of Motion 3 times a day  - Reposition patient every 3 hours    - Dangle patient 3 times a day  - Stand patient 3 times a day  - Ambulate patient 3 times a day  - Out of bed to chair 3 times a day   - Out of bed for meals 3 times a day  - Out of bed for toileting  - Record patient progress and toleration of activity level   Outcome: Progressing     Problem: DISCHARGE PLANNING  Goal: Discharge to home or other facility with appropriate resources  Description: INTERVENTIONS:  - Identify barriers to discharge w/patient and caregiver  - Arrange for needed discharge resources and transportation as appropriate  - Identify discharge learning needs (meds, wound care, etc )  - Arrange for interpretive services to assist at discharge as needed  - Refer to Case Management Department for coordinating discharge planning if the patient needs post-hospital services based on physician/advanced practitioner order or complex needs related to functional status, cognitive ability, or social support system  Outcome: Progressing     Problem: Knowledge Deficit  Goal: Patient/family/caregiver demonstrates understanding of disease process, treatment plan, medications, and discharge instructions  Description: Complete learning assessment and assess knowledge base    Interventions:  - Provide teaching at level of understanding  - Provide teaching via preferred learning methods  Outcome: Progressing

## 2021-09-04 NOTE — BRIEF OP NOTE (RAD/CATH)
Paracentesis Procedure Note    PATIENT NAME: aMgnolia Longoria  : 1950  MRN: [id number]     Pre-op Diagnosis:   1  Ascites      Post-op Diagnosis:   1  Ascites        Surgeon:   Oziel Love DO  Assistants:     No qualified resident was available      Estimated Blood Loss: none  Findings: Ascites    Specimens: 2200 mL cloudy ascites removed    Complications:  none    Anesthesia: local    Oziel Love DO     Date: 2021  Time: 11:17 AM

## 2021-09-04 NOTE — ASSESSMENT & PLAN NOTE
· Patient presented to the ED with shortness of breath  Patient has a history of endometrial cancer, about a week ago started developing abdominal distension associated with shortness of breath and early satiety  Patient had a CT scan done 09/02/2021 which showed interval development of large volume ascites, increased patchy consolidation in both lung bases suspicious for developing pneumonia, increased bilateral pleural effusions  She was seen in [facility] ED 9/3 but did not have Interventional Radiology on-call  Patient returned to the ED today  Evaluated by IR, status post paracentesis, 2 2 L of fluid removed  · Upon my evaluation after paracentesis shortness of breath resolved, patient is saturating 98% on room air, not tachycardic, speaking in full sentences  · EKG normal, no ST-T changes, no tachycardia  Troponin normal  · Shortness of breath most likely secondary to ascites, bilateral pleural effusions, CT scan from 2 days ago showed possible consolidation in both lung bases suspicious for developing pneumonia  But patient does not have fever chills cough or sputum production afebrile, no white blood cell count  ? Consider atelectasis  · 09/01/2021 patient was diagnosed with acute bilateral LE DVTs in posterior tibial, peroneal and soleal veins  She was started on Eliquis Thursday night  She missed her doses on Friday night and Saturday morning being in the ED  ED was concerned for possible PE  V/Q scan was ordered stat, pending  Patient is allergic to dye and would not go through prep  · Since shortness of breath resolved, patient is feeling much better after paracentesis recommend observation overnight and if V/Q scan cannot be obtained during the weekend consider discharging tomorrow with close follow-up with primary care doctor and gyn Onc  If she remains hemodynamically stable on room air, management would not be changed  Continue Eliquis 10 mg b i d  For 7 days and then 5 mg b i d

## 2021-09-04 NOTE — ASSESSMENT & PLAN NOTE
The venous Doppler 9/1 showed bilateral acute occlusive DVT posterior all veins, peroneal vein and soleal vein  She started Eliquis Thursday evening  She missed her doses on Friday evening and Saturday morning due to being in the ED    Restart Eliquis with 1st dose now

## 2021-09-04 NOTE — ASSESSMENT & PLAN NOTE
· Patient presented to the ED with shortness of breath  Patient has a history of endometrial cancer, about a week ago started developing abdominal distension associated with shortness of breath and early satiety  Patient had a CT scan done 09/02/2021 which showed interval development of large volume ascites, increased patchy consolidation in both lung bases suspicious for developing pneumonia, increased bilateral pleural effusions  She was seen in [facility] ED 9/3 but did not have Interventional Radiology on-call  Patient returned to the ED today  Evaluated by IR, status post paracentesis, 2 2 L of fluid removed  · Upon my evaluation after paracentesis shortness of breath resolved, patient is saturating 98% on room air, not tachycardic, speaking in full sentences  · EKG normal, no ST-T changes, no tachycardia  Troponin normal  · Shortness of breath most likely secondary to ascites, bilateral pleural effusions, CT scan from 2 days ago showed possible consolidation in both lung bases suspicious for developing pneumonia  But patient does not have fever chills cough or sputum production afebrile, no white blood cell count  ? Consider atelectasis  · 09/01/2021 patient was diagnosed with acute bilateral LE DVTs in posterior tibial, peroneal and soleal veins  She was started on Eliquis Thursday night  She missed her doses on Friday night and Saturday morning being in the ED  ED was concerned for possible PE  V/Q scan was ordered stat, pending  Patient is allergic to dye and would not go through prep  · Since shortness of breath resolved, patient is feeling much better after paracentesis recommend observation overnight and if V/Q scan cannot be obtained during the weekend consider discharging tomorrow with close follow-up with primary care doctor and gyn Onc  If she remains hemodynamically stable on room air, management would not be changed  Continue Eliquis 10 mg b i d  For 7 days and then 5 mg b i d   ECHO ordered

## 2021-09-04 NOTE — ASSESSMENT & PLAN NOTE
Patient has a history of endometrial adenocarcinoma, status post robotic hysterectomy and 2 cycles of carboplatin paclitaxel  Patient had a CT scan done 09/02/2021 which showed interval development of large volume ascites  Status post paracentesis today, 2 2 L of removed

## 2021-09-04 NOTE — ED ATTENDING ATTESTATION
9/4/2021  I, Olamide Vail MD, saw and evaluated the patient  I have discussed the patient with the resident/non-physician practitioner and agree with the resident's/non-physician practitioner's findings, Plan of Care, and MDM as documented in the resident's/non-physician practitioner's note, except where noted  All available labs and Radiology studies were reviewed  I was present for key portions of any procedure(s) performed by the resident/non-physician practitioner and I was immediately available to provide assistance  At this point I agree with the current assessment done in the Emergency Department  I have conducted an independent evaluation of this patient a history and physical is as follows:    OA: 65 y/o f with h/o breast cancer currently on chemotherapy who presents with progressive abdominal distension and SOB  Pt had outpatient CT imaging as well as previous visit to the ED on 9/3 where she was offered a paracentesis but declined preferring IR to perform the procedure  Presents now for the same  Otherwise denies any cp/pressure/palpitations  Does admit to fatigue/SOB/DEL CASTILLO  + abdominal discomfort in her epigastric region  No n/v  No change in stools  No fevers/chills  Of note, recently started on eliquis for b/l LE DVT, did not taker her dose last night or this morning  PE, NAD, mildly tachycardic, NC/AT, MMM, clear sclera/conjunctiva, neck supple/FROM, -murmurs, lungs CTAB, -w/r, abd distended, mild diffuse ttp with most pronounced in the epigastric region, -r/g, mild LE edema, intact distal pulses and capillary refill < 2 sec, AAO  A/p SOB with abdominal distension  + ascites confirmed on previous imaging  Also with progressive SOB, likely secondary to ascites however cannot r/o PE given LE DVT not fully anticoagulated  Check basic blood work, EKG, discuss with IR  Pt with allergy to contrast and declines the prep, will likely require admission for further evaluation         ED Course Critical Care Time  Procedures

## 2021-09-04 NOTE — DISCHARGE INSTRUCTIONS
Abdominal Paracentesis     WHAT YOU NEED TO KNOW:   Abdominal paracentesis is a procedure to remove abnormal fluid buildup in your abdomen  Fluid builds up because of liver problems, such as swelling and scarring  Heart failure, kidney disease, a mass, or problems with your pancreas may also cause fluid buildup  DISCHARGE INSTRUCTIONS:     Follow up with your healthcare provider as directed: Write down your questions so you remember to ask them during your visits  Wound care: Remove dressing after 24 hours  Leave glue in place  Return to your normal activities    Contact Interventional Radiology at [phone] Nicolas PATIENTS: Contact Interventional Radiology at [phone]) Tigre Melchor PATIENTS: Contact Interventional Radiology at [phone]) if:  · You have a fever and your wound is red and swollen  · You have yellow, green, or bad-smelling discharge coming from your wound  · You have pain or swelling in your abdomen  · You have an upset stomach or you vomit  · You have sudden, sharp pain in your abdomen  · You urinate very little or not at all  · You feel confused and more tired than usual    · Your arm or leg feels warm, tender, and painful  It may look swollen and red  · You suddenly feel lightheaded and have trouble breathing

## 2021-09-04 NOTE — SEDATION DOCUMENTATION
Paracentesis performed by Dr Serene Barnard, without complication  2200 mL of chidi cloudy fluid removed  Specimens sent to lab  Band-aid in place on right side of abdomen  Patient transported back to bed  Report given to [facility]

## 2021-09-04 NOTE — H&P
[facility]  H&P- Debora Gordon 1950, 66 y o  female MRN: [id number]  Unit/Bed#: [facility] 911-01 Encounter: [id number]  Primary Care Provider: Libby Browning MD   Date and time admitted to hospital: 9/4/2021  7:53 AM    * SOB (shortness of breath)  Assessment & Plan  · Patient presented to the ED with shortness of breath  Patient has a history of endometrial cancer, about a week ago started developing abdominal distension associated with shortness of breath and early satiety  Patient had a CT scan done 09/02/2021 which showed interval development of large volume ascites, increased patchy consolidation in both lung bases suspicious for developing pneumonia, increased bilateral pleural effusions  She was seen in [facility] ED 9/3 but did not have Interventional Radiology on-call  Patient returned to the ED today  Evaluated by IR, status post paracentesis, 2 2 L of fluid removed  · Upon my evaluation after paracentesis shortness of breath resolved, patient is saturating 98% on room air, not tachycardic, speaking in full sentences  · EKG normal, no ST-T changes, no tachycardia  Troponin normal  · Shortness of breath most likely secondary to ascites, bilateral pleural effusions, CT scan from 2 days ago showed possible consolidation in both lung bases suspicious for developing pneumonia  But patient does not have fever chills cough or sputum production afebrile, no white blood cell count  ? Consider atelectasis  · 09/01/2021 patient was diagnosed with acute bilateral LE DVTs in posterior tibial, peroneal and soleal veins  She was started on Eliquis Thursday night  She missed her doses on Friday night and Saturday morning being in the ED  ED was concerned for possible PE  V/Q scan was ordered stat, pending  Patient is allergic to dye and would not go through prep     · Since shortness of breath resolved, patient is feeling much better after paracentesis recommend observation overnight and if V/Q scan cannot be obtained during the weekend consider discharging tomorrow with close follow-up with primary care doctor and gyn Onc  If she remains hemodynamically stable on room air, management would not be changed  Continue Eliquis 10 mg b i d  For 7 days and then 5 mg b i d  ECHO ordered    Endometrial cancer [facility])  Assessment & Plan  Patient has a history of endometrial adenocarcinoma, status post robotic hysterectomy and 2 cycles of carboplatin paclitaxel  Patient had a CT scan done 09/02/2021 which showed interval development of large volume ascites  Status post paracentesis today, 2 2 L of removed      DVT (deep venous thrombosis) ([facility] [address] )  Assessment & Plan  The venous Doppler 9/1 showed bilateral acute occlusive DVT posterior all veins, peroneal vein and soleal vein  She started Eliquis Thursday evening  She missed her doses on Friday evening and Saturday morning due to being in the ED  Restart Eliquis with 1st dose now    VTE Pharmacologic Prophylaxis: VTE Score: 10 High Risk (Score >/= 5) - Pharmacological DVT Prophylaxis Ordered: apixaban (Eliquis)  Sequential Compression Devices Ordered  Code Status: Level 1 - Full Code full code  Discussion with family: Patient declined call to   She will update her     Anticipated Length of Stay: Patient will be admitted on an observation basis with an anticipated length of stay of less than 2 midnights secondary to Shortness of breath, rule out PE  Total Time for Visit, including Counseling / Coordination of Care: 45 minutes Greater than 50% of this total time spent on direct patient counseling and coordination of care  Chief Complaint:  Shortness of breath    History of Present Illness:  Joanne Mckinney is a 66 y o  female with a PMH of endometrial cancer who presents with shortness of breath    Patient was diagnosed with endometrial cancer in May, status post robotic hysterectomy and 2 cycles of carboplatin paclitaxel so far  About 7 days ago patient started developing abdominal distension associated with shortness of breath and early satiety  CT abdomen pelvis was ordered by gyn Onc, done on 09/02/2021  It showed interval development of large volume ascites, increased patchy consolidation in both lung bases suspicious for developing pneumonia, increased bilateral pleural effusion  She was scheduled for paracenteses for Tuesday next week, but since her symptoms were getting worse she presented to [facility] ED last night  Since there was no interventional radiologist on-call she decided to come here instead this morning  She was evaluated by IR, status post paracentesis 2 2 L removed  Workup pending  After paracentesis her shortness of breath tremendously improved, hemodynamically stable  Due to bilateral lower leg swelling, patient had a venous Doppler 09/01/2021 which showed bilateral occlusive DVTs  She was started on Eliquis 9/2 in the evening  She missed her doses of Eliquis Friday evening and Saturday morning being in the ED  Upon my evaluation patient is awake alert and oriented, denies headache, lightheadedness, dizziness, visual changes, chest pain palpitation shortness of breath nausea vomiting abdominal pain or trouble with urination  She still cannot eat a lot at 1 time        Review of Systems:  Review of Systems all reviewed and negative except as above    Past Medical and Surgical History:   Past Medical History:   Diagnosis Date    Breast cancer ([address] )     History of transfusion 2018    PONV (postoperative nausea and vomiting)        Past Surgical History:   Procedure Laterality Date    BREAST LUMPECTOMY Right 2007    COLONOSCOPY      DILATION AND CURETTAGE OF UTERUS      FEMUR SURGERY Right 2018    FL GUIDED CENTRAL VENOUS ACCESS DEVICE INSERTION  7/13/2021    HYSTERECTOMY      IR PARACENTESIS  9/4/2021    NH INSJ TUNNELED CTR VAD W/SUBQ PORT AGE 5 YR/> N/A 7/13/2021 Procedure: INSERTION VENOUS PORT ( PORT-A-CATH) IR;  Surgeon: Lalit Orozco DO;  [facility];  Service: Interventional Radiology    IN LAPAROSCOPY W TOT HYSTERECTUTERUS <=250 GRAM  W TUBE/OVARY N/A 6/3/2021    Procedure: ROBOT ASSISTED TOTAL LAPAROSCOPIC HYSTERECTOMY, BILATERAL SALPINGO-OOPHORECTOMY, SENTINEL LYMPH NODE MAPPING AND BIOPSY;  Surgeon: Frank aTi MD;  [facility];  Service: Gynecology Oncology    TUBAL LIGATION         Meds/Allergies:  Prior to Admission medications    Medication Sig Start Date End Date Taking? Authorizing Provider   acetaminophen (TYLENOL) 500 mg tablet Take 500 mg by mouth every 6 (six) hours as needed for mild pain   Yes Historical Provider, MD   apixaban (ELIQUIS) 5 mg Take 2 tablets (10 mg total) by mouth 2 (two) times a day for 7 days, THEN 1 tablet (5 mg total) 2 (two) times a day for 23 days   9/1/21 10/1/21 Yes Frank Tai MD   Ascorbic Acid (vitamin C) 1000 MG tablet Take 1,000 mg by mouth daily   Yes Historical Provider, MD   azelastine (OPTIVAR) 0 05 % ophthalmic solution Administer 1 drop to both eyes daily as needed   Yes Historical Provider, MD   Calcium Carb-Cholecalciferol (CALCIUM 1000 + D PO) Take 2,000 Units by mouth     Yes Historical Provider, MD   Cholecalciferol (Vitamin D3) 25 MCG (1000 UT) CAPS Take 1 capsule by mouth 2 (two) times a day   Yes Historical Provider, MD   Cranberry 1000 MG CAPS Take by mouth   Yes Historical Provider, MD   diphenhydrAMINE HCl (BENADRYL ALLERGY PO) Take by mouth   Yes Historical Provider, MD   LUTEIN PO Take 1 tablet by mouth daily   Yes Historical Provider, MD   metroNIDAZOLE (METROCREAM) 0 75 % cream Apply topically 2 (two) times a day   Yes Historical Provider, MD   ondansetron (ZOFRAN) 8 mg tablet Take 1 tablet (8 mg total) by mouth every 8 (eight) hours as needed for nausea or vomiting 7/7/21  Yes LAUREL Ventura   Probiotic Product (CULTURELLE PROBIOTICS PO) Take by mouth   Yes Historical Provider, MD   vitamin E, tocopherol, 1,000 units capsule Take 1,000 Units by mouth daily   Yes Historical Provider, MD   Vitamins A & D (VITAMIN A & D PO) Take by mouth   Yes Historical Provider, MD   LORazepam (ATIVAN) 1 mg tablet Take 1 tablet (1 mg total) by mouth every 6 (six) hours as needed for anxiety (or nausea)  Patient not taking: Reported on 9/4/2021 7/7/21   LAUREL Araya   methylPREDNISolone 4 MG tablet therapy pack Use as directed on package  Patient not taking: Reported on 9/4/2021 7/15/21   LAUREL Araya   naloxone (NARCAN) 4 mg/0 1 mL nasal spray Administer 1 spray into a nostril  If no response after 2-3 minutes, give another dose in the other nostril using a new spray  Patient not taking: Reported on 9/4/2021 7/7/21   LAUREL Araya   nitrofurantoin (MACROBID) 100 mg capsule Take 1 capsule (100 mg total) by mouth 2 (two) times a day  Patient not taking: Reported on 9/4/2021 8/10/21   Yony Gonzales PA-C   oxyCODONE (ROXICODONE) 5 mg immediate release tablet 1 -2 tabs by mouth every 4-6 hour as needed  Patient not taking: Reported on 9/4/2021 6/3/21   Gaby Milan MD   VITAMIN K PO Take 50 mcg by mouth daily   Patient not taking: Reported on 9/4/2021    Historical Provider, MD     I have reviewed home medications with patient personally  Allergies:    Allergies   Allergen Reactions    Milk-Related Compounds - Food Allergy GI Intolerance    Whey Protein [Protein] Other (See Comments) and GI Intolerance     Flu symptoms      Bactrim [Sulfamethoxazole-Trimethoprim] Hives    Iodine - Food Allergy Swelling    Shellfish Allergy - Food Allergy Vomiting    Cephalexin Rash    Levaquin [Levofloxacin] Rash       Social History:  Marital Status: /Civil Union   Substance Use History:   Social History     Substance and Sexual Activity   Alcohol Use Yes    Alcohol/week: 1 0 standard drinks    Types: 1 Glasses of wine per week     Social History Tobacco Use   Smoking Status Never Smoker   Smokeless Tobacco Never Used     Social History     Substance and Sexual Activity   Drug Use No       Family History:  Family History   Problem Relation Age of Onset    Ovarian cancer Neg Hx     Cervical cancer Neg Hx     Uterine cancer Neg Hx        Physical Exam:     Vitals:   Blood Pressure: 150/67 (09/04/21 1538)  Pulse: 98 (09/04/21 1538)  Temperature: 98 1 °F (36 7 °C) (09/04/21 1538)  Temp Source: Oral (09/04/21 1538)  Respirations: 18 (09/04/21 1538)  Height: 5' 6" (167 6 cm) (09/04/21 1538)  Weight - Scale: 55 8 kg (123 lb 0 3 oz) (09/04/21 1538)  SpO2: 96 % (09/04/21 1538)    Physical Exam  Constitutional:       General: She is not in acute distress  HENT:      Head: Atraumatic  Cardiovascular:      Rate and Rhythm: Normal rate and regular rhythm  Heart sounds: No murmur heard  No friction rub  No gallop  Pulmonary:      Effort: Pulmonary effort is normal  No respiratory distress  Breath sounds: Normal breath sounds  No wheezing  Abdominal:      General: Bowel sounds are normal  There is no distension  Palpations: Abdomen is soft  Tenderness: There is no abdominal tenderness  Musculoskeletal:      Cervical back: Neck supple  Comments: 1+ bilateral lower extremity edema   Skin:     General: Skin is warm and dry  Neurological:      General: No focal deficit present  Mental Status: She is alert     Psychiatric:         Mood and Affect: Mood normal         Additional Data:     Lab Results:  Results from last 7 days   Lab Units 09/04/21  0915   WBC Thousand/uL 6 76   HEMOGLOBIN g/dL 12 9   HEMATOCRIT % 39 7   PLATELETS Thousands/uL 269   NEUTROS PCT % 65   LYMPHS PCT % 17   MONOS PCT % 15*   EOS PCT % 1     Results from last 7 days   Lab Units 09/04/21  0915   SODIUM mmol/L 132*   POTASSIUM mmol/L 3 9   CHLORIDE mmol/L 99*   CO2 mmol/L 28   BUN mg/dL 15   CREATININE mg/dL 0 86   ANION GAP mmol/L 5   CALCIUM mg/dL 9 0 ALBUMIN g/dL 2 3*   TOTAL BILIRUBIN mg/dL 0 26   ALK PHOS U/L 167*   ALT U/L 27   AST U/L 38   GLUCOSE RANDOM mg/dL 91                       Imaging: Reviewed radiology reports from this admission including: chest xray  IR INPATIENT Paracentesis   Final Result by Harris Smith DO (09/04 1116)   Paracentesis  _________________________________________________________________   COMPARISON: CT abdomen and pelvis 9/2/2021      PROCEDURE DETAILS:    Operators: Dr Seth Baumgarten   Anesthesia: Local   Medications: 1% lidocaine      COMMENTS:   A preprocedure timeout was performed per [facility] protocol  The right abdomen was prepped and draped in the usual sterile fashion  5-Occitan Yueh catheter was advanced under continuous ultrasound guidance into ascites  Following drainage, the skin was cleansed and sterile dressings were applied  Workstation performed: [id number]         XR chest 2 views    (Results Pending)   NM lung perfusion imaging    (Results Pending)       EKG and Other Studies Reviewed on Admission:   · EKG: NSR  HR Normal EKG  ** Please Note: This note has been constructed using a voice recognition system   **

## 2021-09-05 ENCOUNTER — APPOINTMENT (OUTPATIENT)
Dept: NON INVASIVE DIAGNOSTICS | Facility: HOSPITAL | Age: 71
End: 2021-09-05
Payer: MEDICARE

## 2021-09-05 VITALS
SYSTOLIC BLOOD PRESSURE: 148 MMHG | OXYGEN SATURATION: 95 % | DIASTOLIC BLOOD PRESSURE: 68 MMHG | TEMPERATURE: 98.9 F | RESPIRATION RATE: 18 BRPM | HEART RATE: 96 BPM | WEIGHT: 123.02 LBS | BODY MASS INDEX: 19.77 KG/M2 | HEIGHT: 66 IN

## 2021-09-05 LAB
ANION GAP SERPL CALCULATED.3IONS-SCNC: 5 MMOL/L (ref 4–13)
BASOPHILS # BLD AUTO: 0.05 THOUSANDS/ΜL (ref 0–0.1)
BASOPHILS NFR BLD AUTO: 1 % (ref 0–1)
BUN SERPL-MCNC: 12 MG/DL (ref 5–25)
CALCIUM SERPL-MCNC: 8.2 MG/DL (ref 8.3–10.1)
CHLORIDE SERPL-SCNC: 100 MMOL/L (ref 100–108)
CO2 SERPL-SCNC: 28 MMOL/L (ref 21–32)
CREAT SERPL-MCNC: 0.8 MG/DL (ref 0.6–1.3)
EOSINOPHIL # BLD AUTO: 0.06 THOUSAND/ΜL (ref 0–0.61)
EOSINOPHIL NFR BLD AUTO: 1 % (ref 0–6)
ERYTHROCYTE [DISTWIDTH] IN BLOOD BY AUTOMATED COUNT: 13.6 % (ref 11.6–15.1)
GFR SERPL CREATININE-BSD FRML MDRD: 74 ML/MIN/1.73SQ M
GLUCOSE SERPL-MCNC: 94 MG/DL (ref 65–140)
HCT VFR BLD AUTO: 37.8 % (ref 34.8–46.1)
HGB BLD-MCNC: 12.3 G/DL (ref 11.5–15.4)
IMM GRANULOCYTES # BLD AUTO: 0.03 THOUSAND/UL (ref 0–0.2)
IMM GRANULOCYTES NFR BLD AUTO: 0 % (ref 0–2)
LYMPHOCYTES # BLD AUTO: 1.4 THOUSANDS/ΜL (ref 0.6–4.47)
LYMPHOCYTES NFR BLD AUTO: 18 % (ref 14–44)
MAGNESIUM SERPL-MCNC: 2.1 MG/DL (ref 1.6–2.6)
MCH RBC QN AUTO: 26.9 PG (ref 26.8–34.3)
MCHC RBC AUTO-ENTMCNC: 32.5 G/DL (ref 31.4–37.4)
MCV RBC AUTO: 83 FL (ref 82–98)
MONOCYTES # BLD AUTO: 1.26 THOUSAND/ΜL (ref 0.17–1.22)
MONOCYTES NFR BLD AUTO: 16 % (ref 4–12)
NEUTROPHILS # BLD AUTO: 5 THOUSANDS/ΜL (ref 1.85–7.62)
NEUTS SEG NFR BLD AUTO: 64 % (ref 43–75)
NRBC BLD AUTO-RTO: 0 /100 WBCS
PLATELET # BLD AUTO: 244 THOUSANDS/UL (ref 149–390)
PMV BLD AUTO: 9.4 FL (ref 8.9–12.7)
POTASSIUM SERPL-SCNC: 3.8 MMOL/L (ref 3.5–5.3)
RBC # BLD AUTO: 4.57 MILLION/UL (ref 3.81–5.12)
SODIUM SERPL-SCNC: 133 MMOL/L (ref 136–145)
WBC # BLD AUTO: 7.8 THOUSAND/UL (ref 4.31–10.16)

## 2021-09-05 PROCEDURE — 83735 ASSAY OF MAGNESIUM: CPT | Performed by: INTERNAL MEDICINE

## 2021-09-05 PROCEDURE — 93306 TTE W/DOPPLER COMPLETE: CPT | Performed by: INTERNAL MEDICINE

## 2021-09-05 PROCEDURE — 99239 HOSP IP/OBS DSCHRG MGMT >30: CPT | Performed by: INTERNAL MEDICINE

## 2021-09-05 PROCEDURE — 80048 BASIC METABOLIC PNL TOTAL CA: CPT | Performed by: INTERNAL MEDICINE

## 2021-09-05 PROCEDURE — 93306 TTE W/DOPPLER COMPLETE: CPT

## 2021-09-05 PROCEDURE — 85025 COMPLETE CBC W/AUTO DIFF WBC: CPT | Performed by: INTERNAL MEDICINE

## 2021-09-05 RX ADMIN — APIXABAN 10 MG: 5 TABLET, FILM COATED ORAL at 05:18

## 2021-09-05 NOTE — DISCHARGE SUMMARY
[address] [facility]  Discharge- Katlyn Gordon 1950, 66 y o  female MRN: [id number]  Unit/Bed#: [facility] 911-01 Encounter: [id number]  Primary Care Provider: Yanira Sterling MD   Date and time admitted to hospital: 9/4/2021  7:53 AM    DVT (deep venous thrombosis) [facility])  Assessment & Plan  The venous Doppler 9/1 showed bilateral acute occlusive DVT posterior all veins, peroneal vein and soleal vein  She started Eliquis Thursday evening  She missed her doses on Friday evening and Saturday morning due to being in the ED  Restart Eliquis with 1st dose now    Endometrial cancer [facility])  Assessment & Plan  Patient has a history of endometrial adenocarcinoma, status post robotic hysterectomy and 2 cycles of carboplatin paclitaxel  Patient had a CT scan done 09/02/2021 which showed interval development of large volume ascites  Status post paracentesis today, 2 2 L of removed      * SOB (shortness of breath)  Assessment & Plan  · Patient presented to the ED with shortness of breath  Patient has a history of endometrial cancer, about a week ago started developing abdominal distension associated with shortness of breath and early satiety  Patient had a CT scan done 09/02/2021 which showed interval development of large volume ascites, increased patchy consolidation in both lung bases suspicious for developing pneumonia, increased bilateral pleural effusions  She was seen in [address] [place] [address] ED 9/3 but did not have Interventional Radiology on-call  Patient returned to the ED today  Evaluated by IR, status post paracentesis, 2 2 L of fluid removed  · Upon my evaluation after paracentesis shortness of breath resolved, patient is saturating 98% on room air, not tachycardic, speaking in full sentences  · EKG normal, no ST-T changes, no tachycardia    Troponin normal  · Shortness of breath most likely secondary to ascites, bilateral pleural effusions, CT scan from 2 days ago showed possible consolidation in both lung bases suspicious for developing pneumonia  But patient does not have fever chills cough or sputum production afebrile, no white blood cell count  ? Consider atelectasis  · 09/01/2021 patient was diagnosed with acute bilateral LE DVTs in posterior tibial, peroneal and soleal veins  She was started on Eliquis Thursday night  She missed her doses on Friday night and Saturday morning being in the ED  ED was concerned for possible PE  V/Q scan was ordered stat, pending  Patient is allergic to dye and would not go through prep  · Since shortness of breath resolved, patient is feeling much better after paracentesis recommend observation overnight and if V/Q scan cannot be obtained during the weekend consider discharging tomorrow with close follow-up with primary care doctor and gyn Onc  If she remains hemodynamically stable on room air, management would not be changed  Continue Eliquis 10 mg b i d  For 7 days and then 5 mg b i d  ECHO ordered           Medical Problems     Resolved Problems  Date Reviewed: 9/5/2021    None              Discharging Physician / Practitioner: Vanda Catherine MD  PCP: Thad Juarez MD  Admission Date:   Admission Orders (From admission, onward)     Ordered        09/04/21 1314  Place in Observation  Once                   Discharge Date: 09/05/21    Consultations During Hospital Stay:  ·  none    Procedures Performed:   ·  d dimer is elevated  ·  EKG normal, no ST-T changes, no tachycardia  · troponins normal  · Ascites, bilateral pleural effusions  · V/Q scan high probability of PE  · ECHO: EF of 87%, grade 1 diastolic dysfunction, moderate regurgitation of tricuspid valve  No right heart strain  Significant Findings / Test Results:   · As above     Incidental Findings:   · None      Test Results Pending at Discharge (will require follow up):    · None      Outpatient Tests Requested:  · None     Complications:  None    Reason for Admission: [facility] Course:   Shar Cleaning is a 66 y o  female patient who originally presented to the hospital on 9/4/2021 due to sob  She has endometrial cancer with abdominal distension and ascites, 2 liters of fluid removed with improvement of her symptoms  She has d-dimer checked that is elevated, so V/Q scan was ordered, it was high probability of PE  However, she has no hypoxia now, symptom free  She is on eliquis  She needs to follow up with pulmonary and gyn oncology outpatient  PCP in 1 week  Continue with 10 mg bid of eliquis for 1 week and then 5 mg bid, pulmonary follow up  Please see above list of diagnoses and related plan for additional information  Condition at Discharge: stable    Discharge Day Visit / Exam:   * Please refer to separate progress note for these details *    Discussion with Family: Patient declined call to   Discharge instructions/Information to patient and family:   See after visit summary for information provided to patient and family  Provisions for Follow-Up Care:  See after visit summary for information related to follow-up care and any pertinent home health orders  Disposition:   Home    Planned Readmission: no      Discharge Statement:  I spent 45 minutes discharging the patient  This time was spent on the day of discharge  I had direct contact with the patient on the day of discharge  Greater than 50% of the total time was spent examining patient, answering all patient questions, arranging and discussing plan of care with patient as well as directly providing post-discharge instructions  Additional time then spent on discharge activities  Discharge Medications:  See after visit summary for reconciled discharge medications provided to patient and/or family        **Please Note: This note may have been constructed using a voice recognition system**

## 2021-09-05 NOTE — PLAN OF CARE
Problem: PAIN - ADULT  Goal: Verbalizes/displays adequate comfort level or baseline comfort level  Description: Interventions:  - Encourage patient to monitor pain and request assistance  - Assess pain using appropriate pain scale  - Administer analgesics based on type and severity of pain and evaluate response  - Implement non-pharmacological measures as appropriate and evaluate response  - Consider cultural and social influences on pain and pain management  - Notify physician/advanced practitioner if interventions unsuccessful or patient reports new pain  Outcome: Progressing     Problem: INFECTION - ADULT  Goal: Absence or prevention of progression during hospitalization  Description: INTERVENTIONS:  - Assess and monitor for signs and symptoms of infection  - Monitor lab/diagnostic results  - Monitor all insertion sites, i e  indwelling lines, tubes, and drains  - Monitor endotracheal if appropriate and nasal secretions for changes in amount and color  - [place] appropriate cooling/warming therapies per order  - Administer medications as ordered  - Instruct and encourage patient and family to use good hand hygiene technique  - Identify and instruct in appropriate isolation precautions for identified infection/condition  Outcome: Progressing  Goal: Absence of fever/infection during neutropenic period  Description: INTERVENTIONS:  - Monitor WBC    Outcome: Progressing     Problem: SAFETY ADULT  Goal: Patient will remain free of falls  Description: INTERVENTIONS:  - Educate patient/family on patient safety including physical limitations  - Instruct patient to call for assistance with activity   - Consult OT/PT to assist with strengthening/mobility   - Keep Call bell within reach  - Keep bed low and locked with side rails adjusted as appropriate  - Keep care items and personal belongings within reach  - Initiate and maintain comfort rounds  - Apply yellow socks and bracelet for high fall risk patients  - Consider moving patient to room near nurses station  Outcome: Progressing  Goal: Maintain or return to baseline ADL function  Description: INTERVENTIONS:  -  Assess patient's ability to carry out ADLs; assess patient's baseline for ADL function and identify physical deficits which impact ability to perform ADLs (bathing, care of mouth/teeth, toileting, grooming, dressing, etc )  - Assess/evaluate cause of self-care deficits   - Assess range of motion  - Assess patient's mobility; develop plan if impaired  - Assess patient's need for assistive devices and provide as appropriate  - Encourage maximum independence but intervene and supervise when necessary  - Involve family in performance of ADLs  - Assess for home care needs following discharge   - Consider OT consult to assist with ADL evaluation and planning for discharge  - Provide patient education as appropriate  Outcome: Progressing  Goal: Maintains/Returns to pre admission functional level  Description: INTERVENTIONS:  - Perform BMAT or MOVE assessment daily    - Set and communicate daily mobility goal to care team and patient/family/caregiver     - Collaborate with rehabilitation services on mobility goals if consulted  - Out of bed for toileting  - Record patient progress and toleration of activity level   Outcome: Progressing     Problem: DISCHARGE PLANNING  Goal: Discharge to home or other facility with appropriate resources  Description: INTERVENTIONS:  - Identify barriers to discharge w/patient and caregiver  - Arrange for needed discharge resources and transportation as appropriate  - Identify discharge learning needs (meds, wound care, etc )  - Arrange for interpretive services to assist at discharge as needed  - Refer to Case Management Department for coordinating discharge planning if the patient needs post-hospital services based on physician/advanced practitioner order or complex needs related to functional status, cognitive ability, or social support system  Outcome: Progressing     Problem: Knowledge Deficit  Goal: Patient/family/caregiver demonstrates understanding of disease process, treatment plan, medications, and discharge instructions  Description: Complete learning assessment and assess knowledge base  Interventions:  - Provide teaching at level of understanding  - Provide teaching via preferred learning methods  Outcome: Progressing     Problem: Potential for Falls  Goal: Patient will remain free of falls  Description: INTERVENTIONS:  - Educate patient/family on patient safety including physical limitations  - Instruct patient to call for assistance with activity   - Consult OT/PT to assist with strengthening/mobility   - Keep Call bell within reach  - Keep bed low and locked with side rails adjusted as appropriate  - Keep care items and personal belongings within reach  - Initiate and maintain comfort rounds  - Apply yellow socks and bracelet for high fall risk patients  - Consider moving patient to room near nurses station  Outcome: Progressing     Problem: Nutrition/Hydration-ADULT  Goal: Nutrient/Hydration intake appropriate for improving, restoring or maintaining nutritional needs  Description: Monitor and assess patient's nutrition/hydration status for malnutrition  Collaborate with interdisciplinary team and initiate plan and interventions as ordered  Monitor patient's weight and dietary intake as ordered or per policy  Utilize nutrition screening tool and intervene as necessary  Determine patient's food preferences and provide high-protein, high-caloric foods as appropriate       INTERVENTIONS:  - Monitor oral intake, urinary output, labs, and treatment plans  - Assess nutrition and hydration status and recommend course of action  - Evaluate amount of meals eaten  - Assist patient with eating if necessary   - Allow adequate time for meals  - Recommend/ encourage appropriate diets, oral nutritional supplements, and vitamin/mineral supplements  - Order, calculate, and assess calorie counts as needed  - Recommend, monitor, and adjust tube feedings and TPN/PPN based on assessed needs  - Assess need for intravenous fluids  - Provide specific nutrition/hydration education as appropriate  - Include patient/family/caregiver in decisions related to nutrition  Outcome: Progressing

## 2021-09-07 ENCOUNTER — TELEPHONE (OUTPATIENT)
Dept: HEMATOLOGY ONCOLOGY | Facility: CLINIC | Age: 71
End: 2021-09-07

## 2021-09-07 LAB
ATRIAL RATE: 89 BPM
BACTERIA SPEC BFLD CULT: NO GROWTH
GRAM STN SPEC: NORMAL
GRAM STN SPEC: NORMAL
P AXIS: 61 DEGREES
PR INTERVAL: 156 MS
QRS AXIS: 72 DEGREES
QRSD INTERVAL: 78 MS
QT INTERVAL: 340 MS
QTC INTERVAL: 413 MS
T WAVE AXIS: 60 DEGREES
VENTRICULAR RATE: 89 BPM

## 2021-09-07 PROCEDURE — 93010 ELECTROCARDIOGRAM REPORT: CPT | Performed by: INTERNAL MEDICINE

## 2021-09-07 NOTE — TELEPHONE ENCOUNTER
Patient following up with Paul Hdz  She wants to inform Paul Hdz that she went to [facility] ED on 9/3   [facility] did not have an radiologist so they had her go to [facility]

## 2021-09-07 NOTE — TELEPHONE ENCOUNTER
Patient is calling back   She wanted to discuss some things with Cassandra Dash regarding her ED visit

## 2021-09-09 ENCOUNTER — HOSPITAL ENCOUNTER (OUTPATIENT)
Dept: INFUSION CENTER | Facility: CLINIC | Age: 71
Discharge: HOME | End: 2021-09-09
Payer: MEDICARE

## 2021-09-09 ENCOUNTER — NUTRITION (OUTPATIENT)
Dept: NUTRITION | Facility: CLINIC | Age: 71
End: 2021-09-09

## 2021-09-09 ENCOUNTER — HOSPITAL ENCOUNTER (OUTPATIENT)
Dept: INFUSION CENTER | Facility: CLINIC | Age: 71
Discharge: HOME | End: 2021-09-09

## 2021-09-09 VITALS
BODY MASS INDEX: 21.01 KG/M2 | DIASTOLIC BLOOD PRESSURE: 73 MMHG | WEIGHT: 126.1 LBS | HEIGHT: 65 IN | HEART RATE: 95 BPM | RESPIRATION RATE: 18 BRPM | TEMPERATURE: 96.5 F | SYSTOLIC BLOOD PRESSURE: 143 MMHG

## 2021-09-09 DIAGNOSIS — C54.1 ENDOMETRIAL CANCER (HCC): Primary | ICD-10-CM

## 2021-09-09 DIAGNOSIS — Z71.3 NUTRITIONAL COUNSELING: Primary | ICD-10-CM

## 2021-09-09 PROCEDURE — 96417 CHEMO IV INFUS EACH ADDL SEQ: CPT

## 2021-09-09 PROCEDURE — 96375 TX/PRO/DX INJ NEW DRUG ADDON: CPT

## 2021-09-09 PROCEDURE — 96413 CHEMO IV INFUSION 1 HR: CPT

## 2021-09-09 PROCEDURE — 96415 CHEMO IV INFUSION ADDL HR: CPT

## 2021-09-09 PROCEDURE — 96367 TX/PROPH/DG ADDL SEQ IV INF: CPT

## 2021-09-09 RX ORDER — PALONOSETRON 0.05 MG/ML
0.25 INJECTION, SOLUTION INTRAVENOUS ONCE
Status: COMPLETED | OUTPATIENT
Start: 2021-09-09 | End: 2021-09-09

## 2021-09-09 RX ORDER — SODIUM CHLORIDE 9 MG/ML
20 INJECTION, SOLUTION INTRAVENOUS ONCE
Status: COMPLETED | OUTPATIENT
Start: 2021-09-09 | End: 2021-09-09

## 2021-09-09 RX ADMIN — DEXAMETHASONE SODIUM PHOSPHATE 20 MG: 10 INJECTION, SOLUTION INTRAMUSCULAR; INTRAVENOUS at 09:14

## 2021-09-09 RX ADMIN — FOSAPREPITANT 150 MG: 150 INJECTION, POWDER, LYOPHILIZED, FOR SOLUTION INTRAVENOUS at 10:28

## 2021-09-09 RX ADMIN — CARBOPLATIN 334 MG: 10 INJECTION, SOLUTION INTRAVENOUS at 14:27

## 2021-09-09 RX ADMIN — PACLITAXEL 179.4 MG: 6 INJECTION, SOLUTION, CONCENTRATE INTRAVENOUS at 11:28

## 2021-09-09 RX ADMIN — DIPHENHYDRAMINE HYDROCHLORIDE 25 MG: 50 INJECTION, SOLUTION INTRAMUSCULAR; INTRAVENOUS at 09:38

## 2021-09-09 RX ADMIN — PALONOSETRON 0.25 MG: 0.25 INJECTION, SOLUTION INTRAVENOUS at 11:19

## 2021-09-09 RX ADMIN — FAMOTIDINE 20 MG: 10 INJECTION, SOLUTION INTRAVENOUS at 10:03

## 2021-09-09 RX ADMIN — SODIUM CHLORIDE 20 ML/HR: 0.9 INJECTION, SOLUTION INTRAVENOUS at 08:45

## 2021-09-09 NOTE — PROGRESS NOTES
Outpatient Oncology Nutrition Consultation   Type of Consult: Follow Up  Care Location: [facility]    Reason for referral: Abram Montez on 8/24/21 (reason for referral: patient request due to desires discussion regarding her diet while on chemotherapy)  Nutrition Assessment:   Oncology Diagnosis & Treatments: Diagnosed with endometrial cancer 5/2021, s/p total laparoscopic hysterectomy and bilateral salpingo-oophrectomy 5/2021  Began chemotherapy (aloxi, emend, carboplatin, taxol) 7/19/21  Oncology History   Endometrial cancer ([facility] [address] )   5/15/2021 Initial Diagnosis    Endometrial cancer ([facility] [address] )     5/19/2021 -  Cancer Staged    Staging form: Corpus Uteri - Carcinoma, AJCC 8th Edition  - Clinical: FIGO Stage I (cT1, cN0, cM0) - Signed by Sukhdev Casarez MD on 5/19/2021  Histologic grade (G): G3  Histologic grading system: 3 grade system       5/2021 Surgery      Robotically assisted total laparoscopic hysterectomy bilateral salpingo-oophorectomy and sentinel lymph node mapping and biopsy for stage III C1 grade 3 endometrial cancer with 17 of 18 mm invasion, cervical stromal involvement and bilateral positive nodes in the pelvis  Patient has also had small 2-5 mm pulmonary nodules which are too small to characterize as   Metastatic disease  The patient has a history of multiple pneumonias as a child       6/16/2021 -  Cancer Staged    Staging form: Corpus Uteri - Carcinoma, AJCC 8th Edition  - Pathologic: FIGO Stage IIIC1 (pT2, pN1, cM0) - Signed by Sukhdev Casarez MD on 6/16/2021  Histologic grade (G): G3  Histologic grading system: 3 grade system  Residual tumor (R): R0 - None       7/19/2021 -  Chemotherapy    palonosetron (ALOXI), 0 25 mg, Intravenous, Once, 3 of 6 cycles  Administration: 0 25 mg (7/19/2021), 0 25 mg (8/16/2021)  fosaprepitant (EMEND) IVPB, 150 mg, Intravenous, Once, 3 of 6 cycles  Administration: 150 mg (7/19/2021), 150 mg (8/16/2021)  CARBOplatin (PARAPLATIN) IVPB (GOG AUC DOSING), 436 mg, Intravenous, Once, 3 of 6 cycles  Administration: 436 mg (7/19/2021), 280 4 mg (8/16/2021)  PACLItaxel (TAXOL) chemo IVPB, 135 mg/m2 = 220 2 mg (77 1 % of original dose 175 mg/m2), Intravenous, Once, 3 of 6 cycles  Dose modification: 135 mg/m2 (original dose 175 mg/m2, Cycle 1, Reason: Dose modified as per discussion with consulting physician), 110 mg/m2 (original dose 175 mg/m2, Cycle 2, Reason: Dose Not Tolerated, Comment: Neuropathy and pain)  Administration: 220 2 mg (7/19/2021), 177 mg (8/16/2021)     8/2021 Genomic Testing    Insight testing performed      This reveals KRAS mutation, RADr1c (data for 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 of olaparib) PDL-1 for a tumor proportions score of 70%       Past Medical & Surgical Hx:   Patient Active Problem List   Diagnosis    Osteoporosis of multiple sites    Encounter for gynecological examination without abnormal finding    PMB (postmenopausal bleeding)    H/O uterine prolapse    [facility]omet[facility]    History of robot-assisted laparoscopic hysterectomy, BSO, SLND    Encounter for central line care    DVT (deep venous thrombosis) ([address] )    SOB (shortness of breath)     Past Medical History:   Diagnosis Date    Breast cancer ([facility] [facility] )     History of transfusion 2018    PONV (postoperative nausea and vomiting)      Past Surgical History:   Procedure Laterality Date    BREAST LUMPECTOMY Right 2007    COLONOSCOPY      DILATION AND CURETTAGE OF UTERUS      FEMUR SURGERY Right 2018    FL GUIDED CENTRAL VENOUS ACCESS DEVICE INSERTION  7/13/2021    HYSTERECTOMY      IR PARACENTESIS  9/4/2021    TX INSJ TUNNELED CTR VAD W/SUBQ PORT AGE 5 YR/> N/A 7/13/2021    Procedure: INSERTION VENOUS PORT ( PORT-A-CATH) IR;  Surgeon: Gisela Kraus, DO;  [facility];  Service: Interventional Radiology    TX LAPAROSCOPY W TOT HYSTERECTUTERUS <=250 GRAM  W TUBE/OVARY N/A 6/3/2021    Procedure: ROBOT ASSISTED TOTAL LAPAROSCOPIC HYSTERECTOMY, BILATERAL SALPINGO-OOPHORECTOMY, SENTINEL LYMPH NODE MAPPING AND BIOPSY;  Surgeon: Carmelo Starr MD;  Location: [facility];  Service: Gynecology Oncology    TUBAL LIGATION         Review of Medications:   Vitamins, Supplements and Herbals: Med List Reviewed & pt is only taking: probiotic, vitamin D, cranberry     Current Outpatient Medications:     acetaminophen (TYLENOL) 500 mg tablet, Take 500 mg by mouth every 6 (six) hours as needed for mild pain, Disp: , Rfl:     apixaban (ELIQUIS) 5 mg, Take 2 tablets (10 mg total) by mouth 2 (two) times a day for 7 days, THEN 1 tablet (5 mg total) 2 (two) times a day for 23 days  , Disp: 74 tablet, Rfl: 0    Ascorbic Acid (vitamin C) 1000 MG tablet, Take 1,000 mg by mouth daily, Disp: , Rfl:     azelastine (OPTIVAR) 0 05 % ophthalmic solution, Administer 1 drop to both eyes daily as needed, Disp: , Rfl:     Calcium Carb-Cholecalciferol (CALCIUM 1000 + D PO), Take 2,000 Units by mouth  , Disp: , Rfl:     Cholecalciferol (Vitamin D3) 25 MCG (1000 UT) CAPS, Take 1 capsule by mouth 2 (two) times a day, Disp: , Rfl:     Cranberry 1000 MG CAPS, Take by mouth, Disp: , Rfl:     diphenhydrAMINE HCl (BENADRYL ALLERGY PO), Take by mouth, Disp: , Rfl:     LORazepam (ATIVAN) 1 mg tablet, Take 1 tablet (1 mg total) by mouth every 6 (six) hours as needed for anxiety (or nausea) (Patient not taking: Reported on 9/4/2021), Disp: 36 tablet, Rfl: 1    LUTEIN PO, Take 1 tablet by mouth daily, Disp: , Rfl:     metroNIDAZOLE (METROCREAM) 0 75 % cream, Apply topically 2 (two) times a day, Disp: , Rfl:     naloxone (NARCAN) 4 mg/0 1 mL nasal spray, Administer 1 spray into a nostril  If no response after 2-3 minutes, give another dose in the other nostril using a new spray   (Patient not taking: Reported on 9/4/2021), Disp: 1 each, Rfl: 1    ondansetron (ZOFRAN) 8 mg tablet, Take 1 tablet (8 mg total) by mouth every 8 (eight) hours as needed for nausea or vomiting, Disp: 30 tablet, Rfl: 1    oxyCODONE (ROXICODONE) 5 mg immediate release tablet, 1 -2 tabs by mouth every 4-6 hour as needed (Patient not taking: Reported on 9/4/2021), Disp: 10 tablet, Rfl: 0    Probiotic Product (CULTURELLE PROBIOTICS PO), Take by mouth, Disp: , Rfl:     vitamin E, tocopherol, 1,000 units capsule, Take 1,000 Units by mouth daily, Disp: , Rfl:     VITAMIN K PO, Take 50 mcg by mouth daily  (Patient not taking: Reported on 9/4/2021), Disp: , Rfl:     Vitamins A & D (VITAMIN A & D PO), Take by mouth, Disp: , Rfl:   No current facility-administered medications for this visit      Facility-Administered Medications Ordered in Other Visits:     CARBOplatin (PARAPLATIN) 334 mg in sodium chloride 0 9 % 250 mL IVPB, 334 mg, Intravenous, Once, Carmelo Starr MD    diphenhydrAMINE (BENADRYL) 25 mg in sodium chloride 0 9 % 50 mL IVPB, 25 mg, Intravenous, Once, Carmelo Starr MD, Last Rate: 150 mL/hr at 09/09/21 0938, 25 mg at 09/09/21 0938    famotidine (PEPCID) 20 mg in sodium chloride 0 9 % 52 mL IVPB, 20 mg, Intravenous, Once, Carmelo Starr MD    fosaprepitant (EMEND) 150 mg in sodium chloride 0 9 % 250 mL IVPB, 150 mg, Intravenous, Once, Carmelo Starr MD    PACLitaxel (TAXOL) 179 4 mg in sodium chloride 0 9 % 500 mL chemo IVPB, 110 mg/m2 (Treatment Plan Recorded), Intravenous, Once, Carmelo Starr MD    palonosetron (ALOXI) injection 0 25 mg, 0 25 mg, Intravenous, Once, Carmelo Starr MD    Most Recent Lab Results:   Lab Results   Component Value Date    WBC 7 80 09/05/2021    NEUTROABS 5 00 09/05/2021    ALT 27 09/04/2021    AST 38 09/04/2021    ALB 2 3 (L) 09/04/2021    SODIUM 133 (L) 09/05/2021    SODIUM 132 (L) 09/04/2021    K 3 8 09/05/2021    K 3 9 09/04/2021     09/05/2021    BUN 12 09/05/2021    BUN 15 09/04/2021    CREATININE 0 80 09/05/2021    CREATININE 0 86 09/04/2021    EGFR 74 09/05/2021    GLUF 85 08/26/2021    GLUF 98 05/21/2021    GLUC 94 09/05/2021    HGBA1C 5 9 (H) 05/21/2021    HGBA1C 5 6 03/16/2017    CALCIUM 8 2 (L) 09/05/2021    MG 2 1 09/05/2021       Anthropometric Measurements:   Height: 64"  Ht Readings from Last 1 Encounters:   09/09/21 5' 4 57" (1 64 m)     Wt Readings from Last 20 Encounters:   09/09/21 57 2 kg (126 lb 1 7 oz)   09/04/21 55 8 kg (123 lb 0 3 oz)   09/01/21 58 3 kg (128 lb 8 oz)   08/16/21 56 kg (123 lb 7 3 oz)   08/04/21 56 2 kg (124 lb)   07/19/21 54 8 kg (120 lb 13 oz)   07/08/21 55 8 kg (123 lb)   06/30/21 55 8 kg (123 lb)   06/16/21 54 4 kg (120 lb)   06/03/21 54 9 kg (121 lb)   05/19/21 55 1 kg (121 lb 8 oz)   05/07/21 56 kg (123 lb 6 4 oz)   09/14/20 56 2 kg (124 lb)   08/31/20 55 9 kg (123 lb 5 oz)   09/05/18 56 8 kg (125 lb 4 oz)       Weight History:    Usual Weight: 120-124#   Varian: n/a   Home Scale: doesn't have home scale    Oncology Nutrition-Anthropometrics      Nutrition from 9/9/2021 in [address] [place] [address] Nutrition from 8/30/2021 in [place] [address] Oncology Dietitian Kathleen   Patient age (years):  66 years  66 years   Patient (female) height (in):  59 in  59 in   Current Weight to be used for anthropometric calculations (lbs)  126 1 lbs  123 5 lbs   Current Weight to be used for anthropometric calculations (kg)  57 3 kg  56 1 kg   BMI:  21 6  21 2   IBW female:  120 lbs  120 lbs   IBW (kg) female:  54 5 kg  54 5 kg   IBW % (female)  105 1 %  102 9 %   Adjusted BW (female):  121 5 lbs  120 9 lbs   Adjusted BW kg (female):  55 2 kg  55 kg   % weight change after 1 week:  -1 9 %  --   Weight change after 1 week (lbs)  -2 4 lbs  --   % weight change after 1 month:  1 7 %  2 2 %   Weight change after 1 month (lbs)  2 1 lbs  2 7 lbs   % weight change after 3 months:  4 2 %  1 6 %   Weight change after 3 months (lbs)  5 1 lbs  2 lbs   % weight change after 1 year:  0 6 %  --   Weight change after 1 year (lbs)  0 8 lbs  --          Nutrition-Focused Physical Findings: none observed    Food/Nutrition-Related History & Client/Social History:    Current Nutrition Impact Symptoms:  [x] Nausea -zofran helps  [x] Reduced Appetite -x3 days  [] Acid Reflux    [] Vomiting  [] Unintended Wt Loss  [] Malabsorption    [] Diarrhea  [] Unintended Wt Gain  [] Dumping Syndrome    [] Constipation  [] Thick Mucous/Secretions  [] Abdominal Pain    [x] Dysgeusia (Altered Taste)- cardboard   [] Xerostomia (Dry Mouth)  [] Gas    [] Dysosmia (Altered Smell)  [] Thrush  [] Difficulty Chewing    [] Oral Mucositis (Sore Mouth)  [x] Fatigue  [] Hyperglycemia    [] Odynophagia  [] Esophagitis  [] Other:    [] Dysphagia  [] Early Satiety  [] No Problems Eating      Food Allergies & Intolerances: yes: dairy products     Current Diet: Lactose-Free  Current Nutrition Intake: Less than usual   Appetite: Fluctuating  Nutrition Route: PO  Oral Care: brushes BID with baking soda toothpaste    Activity level: ADL     24 Hr Diet Recall:   Breakfast: 1/2c oatmeal with peaches   Snack: pretzels, banana  Dinner: 1/2 roasted chicken leg, 1/2 avocado     Beverages: water (8oz x2-3), decaf tea (12oz x1)   Supplements:    Homemade Smoothies/Shakes ; powdered egg protein and almond milk 1x daily       Oncology Nutrition-Estimated Needs      Nutrition from 2021 in [facility] Nutrition from 2021 in [facility] Dietitian Kay   Weight type used  Actual weight  Actual weight   Weight in kilograms (kg) used for estimated needs  57 3 kg  56 1 kg   Energy needs formula:   30-35 kcal/kg  30-35 kcal/kg   Energy needs based on 30 kcal/k kcal  1684 kcal   Energy needs based on 35 kcal/k kcal  1965 kcal   Protein needs formula:  1 2-1 5 g/kg  1 2-1 5 g/kg   Protein needs based on 1 2 g/k g  67 g   Protein needs based on 1 5 g/kg  86 g  84 g   Fluid needs formula:  30-35 mL/kg  30-35 mL/kg   Fluid needs based on 30 mL/kg  1719 mL  1692 mL   Fluid needs in ounces  58 oz  57 oz   Fluid needs based on 35 mL/kg 2006 mL  1974 mL   Fluid needs in ounces  68 oz  67 oz           Discussion & Intervention:   Katlyn was evaluated today for an RD follow up regarding guidance on diet during cancer treatment   Katlyn is currently undergoing tx for endometrial cancer  Today Vitaly Rosario explains that she has been experiencing taste changes, fatigue, and decreased appetite for the past few days  She is also s/p paracentesis on 9/4 with 2 2L fluid removed  Reviewed 24 hour recall, which revealed an inadequate po intake, and discussed ways to optimize nutrient intake and increase fluid intake  Encouraged Owen to continue her homemade smoothies and increase frequency to aid in calorie and protein intake  Reviewed nutrition therapy for taste changes, suggestions include: focus on foods that you like and avoid foods that are unappealing, use plastic utensils, dianna foods, marinate meats in sweet or tart fruit juices, experiment with the F A S S  Concept - add extra Fat, Acidity, Salt, and Sweetness to foods to help improve flavor  Owen plans to try adding brown sugar to her oatmeal to help improve taste  Additional discussion included: MNT for: weight management, nausea, fatigue, how to modify foods for anticipated nutrition impact symptoms pt may experience during CA tx, adequate hydration & fluid choices, sipping on calorie containing beverages (examples include: adding whole milk or cream to coffee, oral nutrition supplements, juice, electrolyte replacement beverages, milk, etc ), having consistent and planned snacks between meals, eating when feeling most hungry and increasing oral nutrition supplements   Moving forward, Katlyn was encouraged to increase kcal, protein, and fluid intakes   Materials Provided: not applicable  All questions and concerns addressed during todays visit  Katlyn has RD contact information      Nutrition Diagnosis:    Inadequate Energy Intake related to physiological causes, disease state and treatment related issues as evidenced by food recall, wt loss and discussion with pt and/or family   Increased Nutrient Needs (kcal & pro) related to increased demand for nutrients and disease state as evidenced by cancer dx and pt undergoing tx for cancer  Monitoring & Evaluation:   Goals:  · weight maintenance/stabilization  · adequate nutrition impact symptom management  · pt to meet >/=75% estimated nutrition needs daily    · Progress Towards Goals: Progressing    Nutrition Rx & Recommendations:  · Diet: High Calorie, High Protein (for high calorie foods see pages 52-53, and for high protein foods see pages 49-51 in your Eating Hints book)  · Keep a daily food journal (pen/paper, jose such as Appear Here)  · Small, frequent meals/snacks may be easier to tolerate than 3 large daily meals  Aim for 5-6 small meals per day (every 2-3 hours)  · Include protein at all meals/snacks  · Include a variety of foods (as tolerated/allowed by diet)  · Incorporate physical activity as able/allowed  · Stay hydrated by sipping fluids of choice/tolerance throughout the day  · Liquid nutrition may be better tolerated than solids at times  · Alter food choices and eating patterns to accommodate changing needs  · Refer to Eating Hints book for other meal/snack ideas and symptom management  · For lack of taste: Practice good oral care  Blend fresh fruits into shakes, ice cream or yogurt  Eat frozen fruits: grapes, chopped cantaloupe, watermelon  Select fresh vegetables (may be more appealing than canned/frozen)  Add extra flavor to foods: experiment with spices, salt & sweeteners, extra oil/butter, acidic foods; marinate meats (see pages 29-30 in your Eating Hints book)    · For appetite loss: try powdered or liquid nutrition supplements; eating by the clock every 2-3 hours; set a timer to remind yourself to eat, keep snacks nearby; add extra protein & calories to your diet; drink liquids in between meals, choose liquids that add calories; eat a bedtime snack; eat soft, cool or frozen foods; eat a larger meal when you feel well & rested; only sip small amounts of liquids during meals (see pages 10-12 in your Eating Hints book)  · For weight loss: monitor your weight at home at least 2x/week, record your weight, start by adding 250-500 extra calories per day, eat 5-6 small scheduled meals every 2-3 hrs, choose foods that are high in protein and calories (see pages 49-53 in your Eating Hints book), drink liquids with calories for example: milkshakes/smoothies/juice/soup/whole milk/chocolate milk, cook with protein fortified milk (see recipe on page 36 in your Eating Hints book), consider ready-to-drink oral nutrition supplements such as Ensure Plus, Ensure Enlive, Boost Plus, or Boost Very High Calorie, avoid "diet" and "light" foods when possible, avoid drinking too much with meals, contact your dietitian with any continued weight loss over the course of 1 week  For more info see pages 35-37 in your Eating Hints book  · For nausea/vomiting: try plain/bland foods; try lemon/lime flavors; eat 5-6 small meals/day (except when vomiting); do not skip meals/snacks; choose appealing foods; sip only small amounts of liquids during meals; stay hydrated in between meals; eat foods/drinks that are room temperature; eat dry toast/crackers (see pages  21-22 and 31-32 in your Eating Hints book)  · Weigh yourself regularly  If you notice weight loss, make an effort to increase your daily food/calorie intake  If you continue to notice loss after these efforts, reach out to your dietitian to establish a plan to stabilize weight  · Avoid gas-producing foods such as broccoli, cabbage, sauerkraut, [place] sprouts, cauliflower, corn, cucumbers, onions, peppers, beans, peas, lentils, apples, apple juice, avocado, and melons  Carbonated drinks, chewing gum, and drinking through a straw can also cause gas    Limiting lactose may help for those who are sensitive to milk products  · Continue homemade smoothies, increase frequency to better aid in calorie and protein intake, can add various fruits to add variety       Follow Up Plan: 9/27/21 during infusion   Recommend Referral to Other Providers: none at this time

## 2021-09-09 NOTE — PATIENT INSTRUCTIONS
Nutrition Rx & Recommendations:  · Diet: High Calorie, High Protein (for high calorie foods see pages 52-53, and for high protein foods see pages 49-51 in your Eating Hints book)  · Keep a daily food journal (pen/paper, jose such as [place]udi)  · Small, frequent meals/snacks may be easier to tolerate than 3 large daily meals  Aim for 5-6 small meals per day (every 2-3 hours)  · Include protein at all meals/snacks  · Include a variety of foods (as tolerated/allowed by diet)  · Incorporate physical activity as able/allowed  · Stay hydrated by sipping fluids of choice/tolerance throughout the day  · Liquid nutrition may be better tolerated than solids at times  · Alter food choices and eating patterns to accommodate changing needs  · Refer to Eating Hints book for other meal/snack ideas and symptom management  · For lack of taste: Practice good oral care  Blend fresh fruits into shakes, ice cream or yogurt  Eat frozen fruits: grapes, chopped cantaloupe, watermelon  Select fresh vegetables (may be more appealing than canned/frozen)  Add extra flavor to foods: experiment with spices, salt & sweeteners, extra oil/butter, acidic foods; marinate meats (see pages 29-30 in your Eating Hints book)  · For appetite loss: try powdered or liquid nutrition supplements; eating by the clock every 2-3 hours; set a timer to remind yourself to eat, keep snacks nearby; add extra protein & calories to your diet; drink liquids in between meals, choose liquids that add calories; eat a bedtime snack; eat soft, cool or frozen foods; eat a larger meal when you feel well & rested; only sip small amounts of liquids during meals (see pages 10-12 in your Eating Hints book)    · For weight loss: monitor your weight at home at least 2x/week, record your weight, start by adding 250-500 extra calories per day, eat 5-6 small scheduled meals every 2-3 hrs, choose foods that are high in protein and calories (see pages 49-53 in your Eating Hints book), drink liquids with calories for example: milkshakes/smoothies/juice/soup/whole milk/chocolate milk, cook with protein fortified milk (see recipe on page 36 in your Eating Hints book), consider ready-to-drink oral nutrition supplements such as Ensure Plus, Ensure Enlive, Boost Plus, or Boost Very High Calorie, avoid "diet" and "light" foods when possible, avoid drinking too much with meals, contact your dietitian with any continued weight loss over the course of 1 week  For more info see pages 35-37 in your Eating Hints book  · For nausea/vomiting: try plain/bland foods; try lemon/lime flavors; eat 5-6 small meals/day (except when vomiting); do not skip meals/snacks; choose appealing foods; sip only small amounts of liquids during meals; stay hydrated in between meals; eat foods/drinks that are room temperature; eat dry toast/crackers (see pages  21-22 and 31-32 in your Eating Hints book)  · Weigh yourself regularly  If you notice weight loss, make an effort to increase your daily food/calorie intake  If you continue to notice loss after these efforts, reach out to your dietitian to establish a plan to stabilize weight  · Avoid gas-producing foods such as broccoli, cabbage, sauerkraut, [place] sprouts, cauliflower, corn, cucumbers, onions, peppers, beans, peas, lentils, apples, apple juice, avocado, and melons  Carbonated drinks, chewing gum, and drinking through a straw can also cause gas  Limiting lactose may help for those who are sensitive to milk products  · Continue homemade smoothies, increase frequency to better aid in calorie and protein intake, can add various fruits to add variety       Follow Up Plan: 9/27/21 during infusion   Recommend Referral to Other Providers: none at this time

## 2021-09-09 NOTE — PROGRESS NOTES
Pt here for chemotherapy, taxol/carbo offering no complaints  She wanted me to reach out to [phone] regarding her paracentesis and getting it set up, also needed to find out if we can proceed with treatment for today as patient has a thrombus in her lungs and is on eliquis, Jordan Ledesma gave ok to proceed with treatment  Right port accessed with positive blood return noted throughout treatment  Tolerated infusion without incident  Port flushed and de-accessed  AVS given    Walked out in stable condition

## 2021-09-10 ENCOUNTER — HOSPITAL ENCOUNTER (OUTPATIENT)
Dept: INTERVENTIONAL RADIOLOGY/VASCULAR | Facility: HOSPITAL | Age: 71
Discharge: HOME | End: 2021-09-10
Admitting: RADIOLOGY
Payer: MEDICARE

## 2021-09-10 VITALS — HEART RATE: 92 BPM | DIASTOLIC BLOOD PRESSURE: 68 MMHG | OXYGEN SATURATION: 97 % | SYSTOLIC BLOOD PRESSURE: 155 MMHG

## 2021-09-10 DIAGNOSIS — R18.8 OTHER ASCITES: ICD-10-CM

## 2021-09-10 DIAGNOSIS — C54.1 ENDOMETRIAL CANCER (HCC): ICD-10-CM

## 2021-09-10 PROCEDURE — 49083 ABD PARACENTESIS W/IMAGING: CPT | Performed by: RADIOLOGY

## 2021-09-10 PROCEDURE — 49083 ABD PARACENTESIS W/IMAGING: CPT

## 2021-09-10 RX ORDER — LIDOCAINE WITH 8.4% SOD BICARB 0.9%(10ML)
SYRINGE (ML) INJECTION CODE/TRAUMA/SEDATION MEDICATION
Status: COMPLETED | OUTPATIENT
Start: 2021-09-10 | End: 2021-09-10

## 2021-09-10 RX ADMIN — Medication 10 ML: at 13:43

## 2021-09-13 ENCOUNTER — TELEPHONE (OUTPATIENT)
Dept: HEMATOLOGY ONCOLOGY | Facility: CLINIC | Age: 71
End: 2021-09-13

## 2021-09-13 NOTE — TELEPHONE ENCOUNTER
Patient is having a difficult time eating  The zofran is not working and she is asking if there is anything else she can take for this  Patient states she will take one bit of food and feels ill causing her to not eat any more  Patient can be reached at [phone]

## 2021-09-15 ENCOUNTER — HOSPITAL ENCOUNTER (OUTPATIENT)
Dept: INTERVENTIONAL RADIOLOGY/VASCULAR | Facility: HOSPITAL | Age: 71
Discharge: HOME | End: 2021-09-15
Payer: MEDICARE

## 2021-09-15 VITALS
OXYGEN SATURATION: 96 % | DIASTOLIC BLOOD PRESSURE: 66 MMHG | SYSTOLIC BLOOD PRESSURE: 120 MMHG | HEART RATE: 72 BPM | RESPIRATION RATE: 14 BRPM

## 2021-09-15 DIAGNOSIS — R18.8 OTHER ASCITES: ICD-10-CM

## 2021-09-15 DIAGNOSIS — C54.1 ENDOMETRIAL CANCER (HCC): ICD-10-CM

## 2021-09-15 PROCEDURE — 49083 ABD PARACENTESIS W/IMAGING: CPT

## 2021-09-15 PROCEDURE — 49083 ABD PARACENTESIS W/IMAGING: CPT | Performed by: RADIOLOGY

## 2021-09-15 RX ORDER — LIDOCAINE WITH 8.4% SOD BICARB 0.9%(10ML)
SYRINGE (ML) INJECTION CODE/TRAUMA/SEDATION MEDICATION
Status: COMPLETED | OUTPATIENT
Start: 2021-09-15 | End: 2021-09-15

## 2021-09-15 RX ADMIN — Medication 5 ML: at 13:09

## 2021-09-16 ENCOUNTER — HOSPITAL ENCOUNTER (OUTPATIENT)
Dept: INFUSION CENTER | Facility: CLINIC | Age: 71
Discharge: HOME | End: 2021-09-16
Payer: MEDICARE

## 2021-09-16 DIAGNOSIS — C54.1 ENDOMETRIAL CANCER (HCC): ICD-10-CM

## 2021-09-16 LAB
ALBUMIN SERPL BCP-MCNC: 1.4 G/DL (ref 3.5–5)
ALP SERPL-CCNC: 142 U/L (ref 46–116)
ALT SERPL W P-5'-P-CCNC: 26 U/L (ref 12–78)
ANION GAP SERPL CALCULATED.3IONS-SCNC: 5 MMOL/L (ref 4–13)
AST SERPL W P-5'-P-CCNC: 30 U/L (ref 5–45)
BASOPHILS # BLD MANUAL: 0 THOUSAND/UL (ref 0–0.1)
BASOPHILS NFR MAR MANUAL: 0 % (ref 0–1)
BILIRUB SERPL-MCNC: 0.35 MG/DL (ref 0.2–1)
BUN SERPL-MCNC: 15 MG/DL (ref 5–25)
CALCIUM ALBUM COR SERPL-MCNC: 9.7 MG/DL (ref 8.3–10.1)
CALCIUM SERPL-MCNC: 7.6 MG/DL (ref 8.3–10.1)
CANCER AG125 SERPL-ACNC: 137 U/ML (ref 0–30)
CHLORIDE SERPL-SCNC: 93 MMOL/L (ref 100–108)
CO2 SERPL-SCNC: 27 MMOL/L (ref 21–32)
CREAT SERPL-MCNC: 0.79 MG/DL (ref 0.6–1.3)
EOSINOPHIL # BLD MANUAL: 0.06 THOUSAND/UL (ref 0–0.4)
EOSINOPHIL NFR BLD MANUAL: 1 % (ref 0–6)
ERYTHROCYTE [DISTWIDTH] IN BLOOD BY AUTOMATED COUNT: 13.9 % (ref 11.6–15.1)
GFR SERPL CREATININE-BSD FRML MDRD: 75 ML/MIN/1.73SQ M
GLUCOSE SERPL-MCNC: 137 MG/DL (ref 65–140)
HCT VFR BLD AUTO: 30.8 % (ref 34.8–46.1)
HGB BLD-MCNC: 10.4 G/DL (ref 11.5–15.4)
LYMPHOCYTES # BLD AUTO: 0.68 THOUSAND/UL (ref 0.6–4.47)
LYMPHOCYTES # BLD AUTO: 11 % (ref 14–44)
MAGNESIUM SERPL-MCNC: 1.8 MG/DL (ref 1.6–2.6)
MCH RBC QN AUTO: 27 PG (ref 26.8–34.3)
MCHC RBC AUTO-ENTMCNC: 33.8 G/DL (ref 31.4–37.4)
MCV RBC AUTO: 80 FL (ref 82–98)
METAMYELOCYTES NFR BLD MANUAL: 1 % (ref 0–1)
MONOCYTES # BLD AUTO: 0.86 THOUSAND/UL (ref 0–1.22)
MONOCYTES NFR BLD: 14 % (ref 4–12)
NEUTROPHILS # BLD MANUAL: 4.5 THOUSAND/UL (ref 1.85–7.62)
NEUTS BAND NFR BLD MANUAL: 16 % (ref 0–8)
NEUTS SEG NFR BLD AUTO: 57 % (ref 43–75)
PLATELET # BLD AUTO: 138 THOUSANDS/UL (ref 149–390)
PLATELET BLD QL SMEAR: ABNORMAL
PMV BLD AUTO: 10.3 FL (ref 8.9–12.7)
POTASSIUM SERPL-SCNC: 4.1 MMOL/L (ref 3.5–5.3)
PROT SERPL-MCNC: 5.5 G/DL (ref 6.4–8.2)
RBC # BLD AUTO: 3.85 MILLION/UL (ref 3.81–5.12)
RBC MORPH BLD: NORMAL
SODIUM SERPL-SCNC: 125 MMOL/L (ref 136–145)
WBC # BLD AUTO: 6.16 THOUSAND/UL (ref 4.31–10.16)

## 2021-09-16 PROCEDURE — 85007 BL SMEAR W/DIFF WBC COUNT: CPT

## 2021-09-16 PROCEDURE — 83735 ASSAY OF MAGNESIUM: CPT

## 2021-09-16 PROCEDURE — 85027 COMPLETE CBC AUTOMATED: CPT

## 2021-09-16 PROCEDURE — 86304 IMMUNOASSAY TUMOR CA 125: CPT

## 2021-09-16 PROCEDURE — 80053 COMPREHEN METABOLIC PANEL: CPT

## 2021-09-20 ENCOUNTER — HOSPITAL ENCOUNTER (OUTPATIENT)
Dept: RADIOLOGY | Facility: HOSPITAL | Age: 71
Discharge: HOME | End: 2021-09-20
Payer: MEDICARE

## 2021-09-20 ENCOUNTER — TELEPHONE (OUTPATIENT)
Dept: SURGICAL ONCOLOGY | Facility: CLINIC | Age: 71
End: 2021-09-20

## 2021-09-20 DIAGNOSIS — R11.2 NAUSEA AND VOMITING, INTRACTABILITY OF VOMITING NOT SPECIFIED, UNSPECIFIED VOMITING TYPE: ICD-10-CM

## 2021-09-20 PROCEDURE — 74022 RADEX COMPL AQT ABD SERIES: CPT

## 2021-09-20 NOTE — TELEPHONE ENCOUNTER
Patient called to let us know that she is going to get the x-ray for the abdomen in the next 1 1/2 hours

## 2021-09-20 NOTE — TELEPHONE ENCOUNTER
Patient wanted you to know that she has not been able to eat the last 3 days and also has been vomiting off and on as well  Please call her at [phone]

## 2021-09-22 ENCOUNTER — TELEPHONE (OUTPATIENT)
Dept: HEMATOLOGY ONCOLOGY | Facility: CLINIC | Age: 71
End: 2021-09-22

## 2021-09-22 ENCOUNTER — OFFICE VISIT (OUTPATIENT)
Dept: GYNECOLOGIC ONCOLOGY | Facility: CLINIC | Age: 71
End: 2021-09-22
Payer: MEDICARE

## 2021-09-22 VITALS
DIASTOLIC BLOOD PRESSURE: 70 MMHG | RESPIRATION RATE: 16 BRPM | HEART RATE: 118 BPM | TEMPERATURE: 97.6 F | HEIGHT: 65 IN | OXYGEN SATURATION: 97 % | BODY MASS INDEX: 17.74 KG/M2 | SYSTOLIC BLOOD PRESSURE: 110 MMHG | WEIGHT: 106.5 LBS

## 2021-09-22 DIAGNOSIS — R11.0 NAUSEA: Primary | ICD-10-CM

## 2021-09-22 DIAGNOSIS — B37.81 THRUSH OF MOUTH AND ESOPHAGUS (HCC): ICD-10-CM

## 2021-09-22 DIAGNOSIS — B37.0 THRUSH OF MOUTH AND ESOPHAGUS (HCC): ICD-10-CM

## 2021-09-22 DIAGNOSIS — C54.1 ENDOMETRIAL CANCER (HCC): ICD-10-CM

## 2021-09-22 PROCEDURE — 99215 OFFICE O/P EST HI 40 MIN: CPT | Performed by: OBSTETRICS & GYNECOLOGY

## 2021-09-22 RX ORDER — PROCHLORPERAZINE MALEATE 10 MG
10 TABLET ORAL EVERY 6 HOURS PRN
Qty: 30 TABLET | Refills: 0 | Status: SHIPPED | OUTPATIENT
Start: 2021-09-22 | End: 2021-10-04 | Stop reason: HOSPADM

## 2021-09-22 RX ORDER — MEGESTROL ACETATE 40 MG/1
TABLET ORAL
Qty: 120 TABLET | Refills: 2 | Status: SHIPPED | OUTPATIENT
Start: 2021-09-22 | End: 2021-10-04 | Stop reason: HOSPADM

## 2021-09-22 RX ORDER — MEGESTROL ACETATE 40 MG/ML
SUSPENSION ORAL
COMMUNITY
Start: 2021-09-20 | End: 2021-10-04 | Stop reason: HOSPADM

## 2021-09-22 NOTE — LETTER
September 22, 2021     Ana Maria Raman MD  [address] [facility]    Patient: Lisa Louise   YOB: 1950   Date of Visit: 9/22/2021       Dear Dr Harleen Duran: Thank you for referring Lisa Louise to me for evaluation  Below are my notes for this consultation  If you have questions, please do not hesitate to call me  I look forward to following your patient along with you  Sincerely,        Ma Collet, MD        CC: No Recipients  Ma Collet, MD  9/22/2021  9:54 AM  Incomplete  Assessment/Plan:    Problem List Items Addressed This Visit        Genitourinary    Endometrial cancer [facility])       Patient is very pleasant 43-year-old female with history of stage 3 C1 grade 3 endometrial adenocarcinoma treated with surgery and then 2 cycles of Carboplatinum paclitaxel  The patient has had progressive disease as evidenced by ascites with malignant cells  This has been drained but is also coming through the vagina  The patient is becoming progressively ill with her disease and possibly some chemo toxicity  We discussed   With the patient  And her  of discontinuing present chemotherapy  They are agreeable  I have discussed that any further interventions would be palliative and noncurative  We have discussed aggressive chemotherapy with Doxil and Avastin  The patient however is concerned of the toxicity and being unable to take it  I have quoted approximately 50% response rate with this  As an alternative she could try Megace tablets 80 mg p o  b i d     As the side effect profile is less of this she would prefer to start this way  I would prefer to give this a short course as I have described to the patient that we are already behind in treatment  We will see the patient back in 2 weeks with a  at that time  If there is significant progression of disease then consideration of Doxil Avastin would be indicated    If the patient is feeling better perhaps we could try this regimen for period of time until it fails  Additionally we will treat the patient's symptoms of thrush with nystatin swish and swallow  She does have some nausea that could be better controlled  We will start Compazine for that as well  She has no pain at this time  The patient is excepting of the low likelihood of the  Megace to work  Her  would like to be more aggressive but the patient herself would rather not try chemotherapy at this time  She understands the magnitude of the decision  Informational sheets on Doxil Avastin were given           Relevant Medications    megestrol (MEGACE) 40 MG/ML suspension    megestrol (MEGACE) 40 mg tablet    Other Relevant Orders          Other Visit Diagnoses     Nausea    -  Primary    Relevant Medications    prochlorperazine (COMPAZINE) 10 mg tablet    Thrush of mouth and esophagus (HCC)        Relevant Medications    nystatin (MYCOSTATIN) 500,000 units/5 mL suspension            CHIEF COMPLAINT: Stage IIIC1 endometrial cancer undergoing primary treatment for consideration of further chemotherapy      Problem:  Cancer Staging  Endometrial cancer ([place] [address] )  Staging form: Corpus Uteri - Carcinoma, AJCC 8th Edition  - Clinical: FIGO Stage I (cT1, cN0, cM0) - Signed by Mushtaq Camilo MD on 5/19/2021  - Pathologic: FIGO Stage IIIC1 (pT2, pN1, cM0) - Signed by Mushtaq Camilo MD on 6/16/2021        Previous therapy:  Oncology History   Endometrial cancer ([place] [address] )   5/15/2021 Initial Diagnosis    Endometrial cancer ([place] [address] )     5/19/2021 -  Cancer Staged    Staging form: Corpus Uteri - Carcinoma, AJCC 8th Edition  - Clinical: FIGO Stage I (cT1, cN0, cM0) - Signed by Mushtaq Camilo MD on 5/19/2021  Histologic grade (G): G3  Histologic grading system: 3 grade system       5/2021 Surgery      Robotically assisted total laparoscopic hysterectomy bilateral salpingo-oophorectomy and sentinel lymph node mapping and biopsy for stage III C1 grade 3 endometrial cancer with 17 of 18 mm invasion, cervical stromal involvement and bilateral positive nodes in the pelvis  Patient has also had small 2-5 mm pulmonary nodules which are too small to characterize as   Metastatic disease  The patient has a history of multiple pneumonias as a child  6/16/2021 -  Cancer Staged    Staging form: Corpus Uteri - Carcinoma, AJCC 8th Edition  - Pathologic: FIGO Stage IIIC1 (pT2, pN1, cM0) - Signed by Frank Tai MD on 6/16/2021  Histologic grade (G): G3  Histologic grading system: 3 grade system  Residual tumor (R): R0 - None       7/19/2021 -  Chemotherapy    palonosetron (ALOXI), 0 25 mg, Intravenous, Once, 3 of 6 cycles  Administration: 0 25 mg (7/19/2021), 0 25 mg (8/16/2021), 0 25 mg (9/9/2021)  fosaprepitant (EMEND) IVPB, 150 mg, Intravenous, Once, 3 of 6 cycles  Administration: 150 mg (7/19/2021), 150 mg (8/16/2021), 150 mg (9/9/2021)  CARBOplatin (PARAPLATIN) IVPB (GOG AUC DOSING), 436 mg, Intravenous, Once, 3 of 6 cycles  Administration: 436 mg (7/19/2021), 280 4 mg (8/16/2021), 334 mg (9/9/2021)  PACLItaxel (TAXOL) chemo IVPB, 135 mg/m2 = 220 2 mg (77 1 % of original dose 175 mg/m2), Intravenous, Once, 3 of 6 cycles  Dose modification: 135 mg/m2 (original dose 175 mg/m2, Cycle 1, Reason: Dose modified as per discussion with consulting physician), 110 mg/m2 (original dose 175 mg/m2, Cycle 2, Reason: Dose Not Tolerated, Comment: Neuropathy and pain)  Administration: 220 2 mg (7/19/2021), 177 mg (8/16/2021), 179 4 mg (9/9/2021)     8/2021 Genomic Testing    Insight testing performed  This reveals KRAS mutation, RADr1c (data for 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 of olaparib) PDL-1 for a tumor proportions score of 70%           Patient ID: Fran Das is a 66 y o  female   Patient is very pleasant 65-year-old female with history of stage IIIC1 endometrial adenocarcinoma grade 3 subtype    She underwent robotically assisted total laparoscopic hysterectomy bilateral salpingo-oophorectomy and sentinel lymph node mapping and biopsy  She was recommended  carboplatin paclitaxel therapy and has undergone 2 cycles so far  Over the past few weeks the patient has been feeling more abdominal pain abdominal bloating early satiety and minimal p o  intake  She underwent a CT scan which revealed abdominal ascites but no significant tumor masses  She underwent a therapeutic paracentesis for approximately 2 L of fluid which was positive for adenocarcinoma  She also developed shortness of breath and had known lower extremity DVTs on Eliquis  She was identified with pulmonary emboli likely  As she had just started the Eliquis was recommended to continue this  She was seen in the ER and admitted to the hospital overnight  Patient now notes that fluid is draining from the vagina and requires a pad all the time  It occasionally gushes when she stands up  She notes that her abdomen is not distending further  She is taking minimal p o  She does not have any significant pain  She has been on liquid Megace which causes significant mouth burning  She has no sense of taste  She notes normal bowel and bladder function but does have some occasional nausea and occasional diarrhea  She uses Imodium as needed  The following portions of the patient's history were reviewed and updated as appropriate: allergies, current medications, past family history, past medical history, past surgical history and problem list     Review of Systems   Constitutional: Positive for fatigue and unexpected weight change  Gastrointestinal: Positive for diarrhea and nausea  Genitourinary: Positive for vaginal discharge  Musculoskeletal: Positive for gait problem         Current Outpatient Medications   Medication Sig Dispense Refill    acetaminophen (TYLENOL) 500 mg tablet Take 500 mg by mouth every 6 (six) hours as needed for mild pain      apixaban (ELIQUIS) 5 mg Take 2 tablets (10 mg total) by mouth 2 (two) times a day for 7 days, THEN 1 tablet (5 mg total) 2 (two) times a day for 23 days  74 tablet 0    Ascorbic Acid (vitamin C) 1000 MG tablet Take 1,000 mg by mouth daily      azelastine (OPTIVAR) 0 05 % ophthalmic solution Administer 1 drop to both eyes daily as needed      Calcium Carb-Cholecalciferol (CALCIUM 1000 + D PO) Take 2,000 Units by mouth        Cholecalciferol (Vitamin D3) 25 MCG (1000 UT) CAPS Take 1 capsule by mouth 2 (two) times a day      Cranberry 1000 MG CAPS Take by mouth      diphenhydrAMINE HCl (BENADRYL ALLERGY PO) Take by mouth      LUTEIN PO Take 1 tablet by mouth daily      megestrol (MEGACE) 40 MG/ML suspension take 10 milliliters twice a day      metroNIDAZOLE (METROCREAM) 0 75 % cream Apply topically 2 (two) times a day      omeprazole (PriLOSEC) 20 mg delayed release capsule Take 1 capsule (20 mg total) by mouth daily 30 capsule 1    ondansetron (ZOFRAN) 8 mg tablet Take 1 tablet (8 mg total) by mouth every 8 (eight) hours as needed for nausea or vomiting 30 tablet 1    Probiotic Product (CULTURELLE PROBIOTICS PO) Take by mouth      vitamin E, tocopherol, 1,000 units capsule Take 1,000 Units by mouth daily      Vitamins A & D (VITAMIN A & D PO) Take by mouth      LORazepam (ATIVAN) 1 mg tablet Take 1 tablet (1 mg total) by mouth every 6 (six) hours as needed for anxiety (or nausea) (Patient not taking: Reported on 9/4/2021) 36 tablet 1    megestrol (MEGACE) 40 mg tablet Two tablets by mouth twice daily 120 tablet 2    naloxone (NARCAN) 4 mg/0 1 mL nasal spray Administer 1 spray into a nostril  If no response after 2-3 minutes, give another dose in the other nostril using a new spray   (Patient not taking: Reported on 9/4/2021) 1 each 1    nystatin (MYCOSTATIN) 500,000 units/5 mL suspension Apply 5 mL (500,000 Units total) to the mouth or throat 4 (four) times a day 473 mL 1    oxyCODONE (ROXICODONE) 5 mg immediate release tablet 1 -2 tabs by mouth every 4-6 hour as needed (Patient not taking: Reported on 9/4/2021) 10 tablet 0    prochlorperazine (COMPAZINE) 10 mg tablet Take 1 tablet (10 mg total) by mouth every 6 (six) hours as needed for nausea or vomiting 30 tablet 0    VITAMIN K PO Take 50 mcg by mouth daily  (Patient not taking: Reported on 9/4/2021)       No current facility-administered medications for this visit  Objective:    Blood pressure 110/70, pulse (!) 118, temperature 97 6 °F (36 4 °C), resp  rate 16, height 5' 4 57" (1 64 m), weight 48 3 kg (106 lb 8 oz), SpO2 97 %  Body mass index is 17 96 kg/m²  Body surface area is 1 51 meters squared  Physical Exam  Constitutional:       Appearance: She is well-developed  She is ill-appearing  HENT:      Head: Normocephalic and atraumatic  Neck:      Thyroid: No thyromegaly  Cardiovascular:      Rate and Rhythm: Normal rate and regular rhythm  Heart sounds: Normal heart sounds  Pulmonary:      Effort: Pulmonary effort is normal       Breath sounds: Normal breath sounds  Abdominal:      General: Bowel sounds are normal       Palpations: Abdomen is soft  Comments: Well healed laparoscopic incisions  Genitourinary:     Comments: -Normal external female genitalia, normal [place]'s and [place]'s glands                  -Normal midline urethral meatus  No lesions notes                  -Bladder without fullness mass or tenderness                  -Vagina without lesion or discharge No significant cystocele or rectocele noted                  -Cervix surgically absent                  -Uterus surgically absent                  -Adnexae surgically absent                  - Anus without fissure of lesion  Fluid in vagina with leakage of ascites at vaginal cuff with small opening of 0 25 cm  Musculoskeletal:         General: Normal range of motion  Cervical back: Normal range of motion and neck supple  Lymphadenopathy:      Cervical: No cervical adenopathy     Skin: General: Skin is warm and dry  Neurological:      Mental Status: She is alert and oriented to person, place, and time     Psychiatric:         Behavior: Behavior normal          Lab Results   Component Value Date     137 0 (H) 09/16/2021     Lab Results   Component Value Date    K 4 1 09/16/2021    CL 93 (L) 09/16/2021    CO2 27 09/16/2021    BUN 15 09/16/2021    CREATININE 0 79 09/16/2021    GLUF 85 08/26/2021    CALCIUM 7 6 (L) 09/16/2021    CORRECTEDCA 9 7 09/16/2021    AST 30 09/16/2021    ALT 26 09/16/2021    ALKPHOS 142 (H) 09/16/2021    EGFR 75 09/16/2021     Lab Results   Component Value Date    WBC 6 16 09/16/2021    HGB 10 4 (L) 09/16/2021    HCT 30 8 (L) 09/16/2021    MCV 80 (L) 09/16/2021     (L) 09/16/2021     Lab Results   Component Value Date    NEUTROABS 5 00 09/05/2021        Trend:  Lab Results   Component Value Date     137 0 (H) 09/16/2021     189 3 (H) 08/26/2021     63 6 (H) 08/05/2021     71 0 (H) 07/12/2021                  Leidy Dimas MD  9/22/2021  9:26 AM  Incomplete  Assessment/Plan:    Problem List Items Addressed This Visit     None            CHIEF COMPLAINT: Stage IIIC1 endometrial cancer undergoing primary treatment for consideration of further chemotherapy      Problem:  Cancer Staging  Endometrial cancer ([place] [address] )  Staging form: Corpus Uteri - Carcinoma, AJCC 8th Edition  - Clinical: FIGO Stage I (cT1, cN0, cM0) - Signed by Leidy Dimas MD on 5/19/2021  - Pathologic: FIGO Stage IIIC1 (pT2, pN1, cM0) - Signed by Leidy Dimas MD on 6/16/2021        Previous therapy:  Oncology History   Endometrial cancer ([place] [address] )   5/15/2021 Initial Diagnosis    Endometrial cancer ([place] [address] )     5/19/2021 -  Cancer Staged    Staging form: Corpus Uteri - Carcinoma, AJCC 8th Edition  - Clinical: FIGO Stage I (cT1, cN0, cM0) - Signed by Leidy Dimas MD on 5/19/2021  Histologic grade (G): G3  Histologic grading system: 3 grade system       5/2021 Surgery Robotically assisted total laparoscopic hysterectomy bilateral salpingo-oophorectomy and sentinel lymph node mapping and biopsy for stage III C1 grade 3 endometrial cancer with 17 of 18 mm invasion, cervical stromal involvement and bilateral positive nodes in the pelvis  Patient has also had small 2-5 mm pulmonary nodules which are too small to characterize as   Metastatic disease  The patient has a history of multiple pneumonias as a child  6/16/2021 -  Cancer Staged    Staging form: Corpus Uteri - Carcinoma, AJCC 8th Edition  - Pathologic: FIGO Stage IIIC1 (pT2, pN1, cM0) - Signed by Eleni Winkler MD on 6/16/2021  Histologic grade (G): G3  Histologic grading system: 3 grade system  Residual tumor (R): R0 - None       7/19/2021 -  Chemotherapy    palonosetron (ALOXI), 0 25 mg, Intravenous, Once, 3 of 6 cycles  Administration: 0 25 mg (7/19/2021), 0 25 mg (8/16/2021), 0 25 mg (9/9/2021)  fosaprepitant (EMEND) IVPB, 150 mg, Intravenous, Once, 3 of 6 cycles  Administration: 150 mg (7/19/2021), 150 mg (8/16/2021), 150 mg (9/9/2021)  CARBOplatin (PARAPLATIN) IVPB (GOG AUC DOSING), 436 mg, Intravenous, Once, 3 of 6 cycles  Administration: 436 mg (7/19/2021), 280 4 mg (8/16/2021), 334 mg (9/9/2021)  PACLItaxel (TAXOL) chemo IVPB, 135 mg/m2 = 220 2 mg (77 1 % of original dose 175 mg/m2), Intravenous, Once, 3 of 6 cycles  Dose modification: 135 mg/m2 (original dose 175 mg/m2, Cycle 1, Reason: Dose modified as per discussion with consulting physician), 110 mg/m2 (original dose 175 mg/m2, Cycle 2, Reason: Dose Not Tolerated, Comment: Neuropathy and pain)  Administration: 220 2 mg (7/19/2021), 177 mg (8/16/2021), 179 4 mg (9/9/2021)     8/2021 Genomic Testing    Insight testing performed      This reveals KRAS mutation, RADr1c (data for 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 of olaparib) PDL-1 for a tumor proportions score of 70%           Patient ID: Norma Acevedo is a 66 y o  female   Patient is very pleasant 69-year-old female with history of stage IIIC1 endometrial adenocarcinoma grade 3 subtype  She underwent robotically assisted total laparoscopic hysterectomy bilateral salpingo-oophorectomy and sentinel lymph node mapping and biopsy  She was recommended  carboplatin paclitaxel therapy and has undergone 2 cycles so far  Over the past few weeks the patient has been feeling more abdominal pain abdominal bloating early satiety and minimal p o  intake  She underwent a CT scan which revealed abdominal ascites but no significant tumor masses  She underwent a therapeutic paracentesis for approximately 2 L of fluid which was positive for adenocarcinoma  She also developed shortness of breath and had known lower extremity DVTs on Eliquis  She was identified with pulmonary emboli likely  As she had just started the Eliquis was recommended to continue this  She was seen in the ER and admitted to the hospital overnight  The following portions of the patient's history were reviewed and updated as appropriate: allergies, current medications, past family history, past medical history, past surgical history and problem list     Review of Systems    Current Outpatient Medications   Medication Sig Dispense Refill    acetaminophen (TYLENOL) 500 mg tablet Take 500 mg by mouth every 6 (six) hours as needed for mild pain      apixaban (ELIQUIS) 5 mg Take 2 tablets (10 mg total) by mouth 2 (two) times a day for 7 days, THEN 1 tablet (5 mg total) 2 (two) times a day for 23 days   74 tablet 0    Ascorbic Acid (vitamin C) 1000 MG tablet Take 1,000 mg by mouth daily      azelastine (OPTIVAR) 0 05 % ophthalmic solution Administer 1 drop to both eyes daily as needed      Calcium Carb-Cholecalciferol (CALCIUM 1000 + D PO) Take 2,000 Units by mouth        Cholecalciferol (Vitamin D3) 25 MCG (1000 UT) CAPS Take 1 capsule by mouth 2 (two) times a day      Cranberry 1000 MG CAPS Take by mouth      diphenhydrAMINE HCl (BENADRYL ALLERGY PO) Take by mouth      LUTEIN PO Take 1 tablet by mouth daily      megestrol (MEGACE) 40 MG/ML suspension take 10 milliliters twice a day      metroNIDAZOLE (METROCREAM) 0 75 % cream Apply topically 2 (two) times a day      omeprazole (PriLOSEC) 20 mg delayed release capsule Take 1 capsule (20 mg total) by mouth daily 30 capsule 1    ondansetron (ZOFRAN) 8 mg tablet Take 1 tablet (8 mg total) by mouth every 8 (eight) hours as needed for nausea or vomiting 30 tablet 1    Probiotic Product (CULTURELLE PROBIOTICS PO) Take by mouth      vitamin E, tocopherol, 1,000 units capsule Take 1,000 Units by mouth daily      Vitamins A & D (VITAMIN A & D PO) Take by mouth      LORazepam (ATIVAN) 1 mg tablet Take 1 tablet (1 mg total) by mouth every 6 (six) hours as needed for anxiety (or nausea) (Patient not taking: Reported on 9/4/2021) 36 tablet 1    naloxone (NARCAN) 4 mg/0 1 mL nasal spray Administer 1 spray into a nostril  If no response after 2-3 minutes, give another dose in the other nostril using a new spray  (Patient not taking: Reported on 9/4/2021) 1 each 1    oxyCODONE (ROXICODONE) 5 mg immediate release tablet 1 -2 tabs by mouth every 4-6 hour as needed (Patient not taking: Reported on 9/4/2021) 10 tablet 0    VITAMIN K PO Take 50 mcg by mouth daily  (Patient not taking: Reported on 9/4/2021)       No current facility-administered medications for this visit  Objective:    Blood pressure 110/70, pulse (!) 118, temperature 97 6 °F (36 4 °C), resp  rate 16, height 5' 4 57" (1 64 m), weight 48 3 kg (106 lb 8 oz), SpO2 97 %  Body mass index is 17 96 kg/m²  Body surface area is 1 51 meters squared      Physical Exam    Lab Results   Component Value Date     137 0 (H) 09/16/2021     Lab Results   Component Value Date    K 4 1 09/16/2021    CL 93 (L) 09/16/2021    CO2 27 09/16/2021    BUN 15 09/16/2021    CREATININE 0 79 09/16/2021    GLUF 85 08/26/2021    CALCIUM 7 6 (L) 09/16/2021 CORRECTEDCA 9 7 09/16/2021    AST 30 09/16/2021    ALT 26 09/16/2021    ALKPHOS 142 (H) 09/16/2021    EGFR 75 09/16/2021     Lab Results   Component Value Date    WBC 6 16 09/16/2021    HGB 10 4 (L) 09/16/2021    HCT 30 8 (L) 09/16/2021    MCV 80 (L) 09/16/2021     (L) 09/16/2021     Lab Results   Component Value Date    NEUTROABS 5 00 09/05/2021        Trend:  Lab Results   Component Value Date     137 0 (H) 09/16/2021     189 3 (H) 08/26/2021     63 6 (H) 08/05/2021     71 0 (H) 07/12/2021

## 2021-09-22 NOTE — ASSESSMENT & PLAN NOTE
Patient is very pleasant 18-year-old female with history of stage 3 C1 grade 3 endometrial adenocarcinoma treated with surgery and then 2 cycles of Carboplatinum paclitaxel  The patient has had progressive disease as evidenced by ascites with malignant cells  This has been drained but is also coming through the vagina  The patient is becoming progressively ill with her disease and possibly some chemo toxicity  We discussed   With the patient  And her  of discontinuing present chemotherapy  They are agreeable  I have discussed that any further interventions would be palliative and noncurative  We have discussed aggressive chemotherapy with Doxil and Avastin  The patient however is concerned of the toxicity and being unable to take it  I have quoted approximately 50% response rate with this  As an alternative she could try Megace tablets 80 mg p o  b i d     As the side effect profile is less of this she would prefer to start this way  I would prefer to give this a short course as I have described to the patient that we are already behind in treatment  We will see the patient back in 2 weeks with a  at that time  If there is significant progression of disease then consideration of Doxil Avastin would be indicated  If the patient is feeling better perhaps we could try this regimen for period of time until it fails  Additionally we will treat the patient's symptoms of thrush with nystatin swish and swallow  She does have some nausea that could be better controlled  We will start Compazine for that as well  She has no pain at this time  The patient is excepting of the low likelihood of the  Megace to work  Her  would like to be more aggressive but the patient herself would rather not try chemotherapy at this time  She understands the magnitude of the decision  Informational sheets on Doxil Avastin were given

## 2021-09-22 NOTE — PROGRESS NOTES
Assessment/Plan:    Problem List Items Addressed This Visit        Genitourinary    Endometrial cancer [facility])       Patient is very pleasant 24-year-old female with history of stage 3 C1 grade 3 endometrial adenocarcinoma treated with surgery and then 2 cycles of Carboplatinum paclitaxel  The patient has had progressive disease as evidenced by ascites with malignant cells  This has been drained but is also coming through the vagina  The patient is becoming progressively ill with her disease and possibly some chemo toxicity  We discussed   With the patient  And her  of discontinuing present chemotherapy  They are agreeable  I have discussed that any further interventions would be palliative and noncurative  We have discussed aggressive chemotherapy with Doxil and Avastin  The patient however is concerned of the toxicity and being unable to take it  I have quoted approximately 50% response rate with this  As an alternative she could try Megace tablets 80 mg p o  b i d     As the side effect profile is less of this she would prefer to start this way  I would prefer to give this a short course as I have described to the patient that we are already behind in treatment  We will see the patient back in 2 weeks with a  at that time  If there is significant progression of disease then consideration of Doxil Avastin would be indicated  If the patient is feeling better perhaps we could try this regimen for period of time until it fails  Additionally we will treat the patient's symptoms of thrush with nystatin swish and swallow  She does have some nausea that could be better controlled  We will start Compazine for that as well  She has no pain at this time  The patient is excepting of the low likelihood of the  Megace to work  Her  would like to be more aggressive but the patient herself would rather not try chemotherapy at this time  She understands the magnitude of the decision  Informational sheets on Doxil Avastin were given  Relevant Medications    megestrol (MEGACE) 40 MG/ML suspension    megestrol (MEGACE) 40 mg tablet    Other Relevant Orders          Other Visit Diagnoses     Nausea    -  Primary    Relevant Medications    prochlorperazine (COMPAZINE) 10 mg tablet    Thrush of mouth and esophagus (HCC)        Relevant Medications    nystatin (MYCOSTATIN) 500,000 units/5 mL suspension            CHIEF COMPLAINT: Stage IIIC1 endometrial cancer undergoing primary treatment for consideration of further chemotherapy      Problem:  Cancer Staging  Endometrial cancer ([place] [address] )  Staging form: Corpus Uteri - Carcinoma, AJCC 8th Edition  - Clinical: FIGO Stage I (cT1, cN0, cM0) - Signed by Ai Lozano MD on 5/19/2021  - Pathologic: FIGO Stage IIIC1 (pT2, pN1, cM0) - Signed by Ai Lozano MD on 6/16/2021        Previous therapy:  Oncology History   Endometrial cancer ([facility] )   5/15/2021 Initial Diagnosis    Endometrial cancer ([place] [address] )     5/19/2021 -  Cancer Staged    Staging form: Corpus Uteri - Carcinoma, AJCC 8th Edition  - Clinical: FIGO Stage I (cT1, cN0, cM0) - Signed by Ai Lozano MD on 5/19/2021  Histologic grade (G): G3  Histologic grading system: 3 grade system       5/2021 Surgery      Robotically assisted total laparoscopic hysterectomy bilateral salpingo-oophorectomy and sentinel lymph node mapping and biopsy for stage III C1 grade 3 endometrial cancer with 17 of 18 mm invasion, cervical stromal involvement and bilateral positive nodes in the pelvis  Patient has also had small 2-5 mm pulmonary nodules which are too small to characterize as   Metastatic disease  The patient has a history of multiple pneumonias as a child       6/16/2021 -  Cancer Staged    Staging form: Corpus Uteri - Carcinoma, AJCC 8th Edition  - Pathologic: FIGO Stage IIIC1 (pT2, pN1, cM0) - Signed by Ai Lozano MD on 6/16/2021  Histologic grade (G): G3  Histologic grading system: 3 grade system  Residual tumor (R): R0 - None       7/19/2021 -  Chemotherapy    palonosetron (ALOXI), 0 25 mg, Intravenous, Once, 3 of 6 cycles  Administration: 0 25 mg (7/19/2021), 0 25 mg (8/16/2021), 0 25 mg (9/9/2021)  fosaprepitant (EMEND) IVPB, 150 mg, Intravenous, Once, 3 of 6 cycles  Administration: 150 mg (7/19/2021), 150 mg (8/16/2021), 150 mg (9/9/2021)  CARBOplatin (PARAPLATIN) IVPB (GOG AUC DOSING), 436 mg, Intravenous, Once, 3 of 6 cycles  Administration: 436 mg (7/19/2021), 280 4 mg (8/16/2021), 334 mg (9/9/2021)  PACLItaxel (TAXOL) chemo IVPB, 135 mg/m2 = 220 2 mg (77 1 % of original dose 175 mg/m2), Intravenous, Once, 3 of 6 cycles  Dose modification: 135 mg/m2 (original dose 175 mg/m2, Cycle 1, Reason: Dose modified as per discussion with consulting physician), 110 mg/m2 (original dose 175 mg/m2, Cycle 2, Reason: Dose Not Tolerated, Comment: Neuropathy and pain)  Administration: 220 2 mg (7/19/2021), 177 mg (8/16/2021), 179 4 mg (9/9/2021)     8/2021 Genomic Testing    Insight testing performed  This reveals KRAS mutation, RADr1c (data for 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 of olaparib) PDL-1 for a tumor proportions score of 70%           Patient ID: Eris Kelly is a 66 y o  female   Patient is very pleasant 79-year-old female with history of stage IIIC1 endometrial adenocarcinoma grade 3 subtype  She underwent robotically assisted total laparoscopic hysterectomy bilateral salpingo-oophorectomy and sentinel lymph node mapping and biopsy  She was recommended  carboplatin paclitaxel therapy and has undergone 2 cycles so far  Over the past few weeks the patient has been feeling more abdominal pain abdominal bloating early satiety and minimal p o  intake  She underwent a CT scan which revealed abdominal ascites but no significant tumor masses  She underwent a therapeutic paracentesis for approximately 2 L of fluid which was positive for adenocarcinoma    She also developed shortness of breath and had known lower extremity DVTs on Eliquis  She was identified with pulmonary emboli likely  As she had just started the Eliquis was recommended to continue this  She was seen in the ER and admitted to the hospital overnight  Patient now notes that fluid is draining from the vagina and requires a pad all the time  It occasionally gushes when she stands up  She notes that her abdomen is not distending further  She is taking minimal p o  She does not have any significant pain  She has been on liquid Megace which causes significant mouth burning  She has no sense of taste  She notes normal bowel and bladder function but does have some occasional nausea and occasional diarrhea  She uses Imodium as needed  The following portions of the patient's history were reviewed and updated as appropriate: allergies, current medications, past family history, past medical history, past surgical history and problem list     Review of Systems   Constitutional: Positive for fatigue and unexpected weight change  Gastrointestinal: Positive for diarrhea and nausea  Genitourinary: Positive for vaginal discharge  Musculoskeletal: Positive for gait problem  Current Outpatient Medications   Medication Sig Dispense Refill    acetaminophen (TYLENOL) 500 mg tablet Take 500 mg by mouth every 6 (six) hours as needed for mild pain      apixaban (ELIQUIS) 5 mg Take 2 tablets (10 mg total) by mouth 2 (two) times a day for 7 days, THEN 1 tablet (5 mg total) 2 (two) times a day for 23 days   74 tablet 0    Ascorbic Acid (vitamin C) 1000 MG tablet Take 1,000 mg by mouth daily      azelastine (OPTIVAR) 0 05 % ophthalmic solution Administer 1 drop to both eyes daily as needed      Calcium Carb-Cholecalciferol (CALCIUM 1000 + D PO) Take 2,000 Units by mouth        Cholecalciferol (Vitamin D3) 25 MCG (1000 UT) CAPS Take 1 capsule by mouth 2 (two) times a day      Cranberry 1000 MG CAPS Take by mouth      diphenhydrAMINE HCl (BENADRYL ALLERGY PO) Take by mouth      LUTEIN PO Take 1 tablet by mouth daily      megestrol (MEGACE) 40 MG/ML suspension take 10 milliliters twice a day      metroNIDAZOLE (METROCREAM) 0 75 % cream Apply topically 2 (two) times a day      omeprazole (PriLOSEC) 20 mg delayed release capsule Take 1 capsule (20 mg total) by mouth daily 30 capsule 1    ondansetron (ZOFRAN) 8 mg tablet Take 1 tablet (8 mg total) by mouth every 8 (eight) hours as needed for nausea or vomiting 30 tablet 1    Probiotic Product (CULTURELLE PROBIOTICS PO) Take by mouth      vitamin E, tocopherol, 1,000 units capsule Take 1,000 Units by mouth daily      Vitamins A & D (VITAMIN A & D PO) Take by mouth      LORazepam (ATIVAN) 1 mg tablet Take 1 tablet (1 mg total) by mouth every 6 (six) hours as needed for anxiety (or nausea) (Patient not taking: Reported on 9/4/2021) 36 tablet 1    megestrol (MEGACE) 40 mg tablet Two tablets by mouth twice daily 120 tablet 2    naloxone (NARCAN) 4 mg/0 1 mL nasal spray Administer 1 spray into a nostril  If no response after 2-3 minutes, give another dose in the other nostril using a new spray  (Patient not taking: Reported on 9/4/2021) 1 each 1    nystatin (MYCOSTATIN) 500,000 units/5 mL suspension Apply 5 mL (500,000 Units total) to the mouth or throat 4 (four) times a day 473 mL 1    oxyCODONE (ROXICODONE) 5 mg immediate release tablet 1 -2 tabs by mouth every 4-6 hour as needed (Patient not taking: Reported on 9/4/2021) 10 tablet 0    prochlorperazine (COMPAZINE) 10 mg tablet Take 1 tablet (10 mg total) by mouth every 6 (six) hours as needed for nausea or vomiting 30 tablet 0    VITAMIN K PO Take 50 mcg by mouth daily  (Patient not taking: Reported on 9/4/2021)       No current facility-administered medications for this visit  Objective:    Blood pressure 110/70, pulse (!) 118, temperature 97 6 °F (36 4 °C), resp   rate 16, height 5' 4 57" (1 64 m), weight 48 3 kg (106 lb 8 oz), SpO2 97 %  Body mass index is 17 96 kg/m²  Body surface area is 1 51 meters squared  Physical Exam  Constitutional:       Appearance: She is well-developed  She is ill-appearing  HENT:      Head: Normocephalic and atraumatic  Neck:      Thyroid: No thyromegaly  Cardiovascular:      Rate and Rhythm: Normal rate and regular rhythm  Heart sounds: Normal heart sounds  Pulmonary:      Effort: Pulmonary effort is normal       Breath sounds: Normal breath sounds  Abdominal:      General: Bowel sounds are normal       Palpations: Abdomen is soft  Comments: Well healed laparoscopic incisions  Genitourinary:     Comments: -Normal external female genitalia, normal Bartholin's and Sherrill's glands                  -Normal midline urethral meatus  No lesions notes                  -Bladder without fullness mass or tenderness                  -Vagina without lesion or discharge No significant cystocele or rectocele noted                  -Cervix surgically absent                  -Uterus surgically absent                  -Adnexae surgically absent                  - Anus without fissure of lesion  Fluid in vagina with leakage of ascites at vaginal cuff with small opening of 0 25 cm  Musculoskeletal:         General: Normal range of motion  Cervical back: Normal range of motion and neck supple  Lymphadenopathy:      Cervical: No cervical adenopathy  Skin:     General: Skin is warm and dry  Neurological:      Mental Status: She is alert and oriented to person, place, and time     Psychiatric:         Behavior: Behavior normal          Lab Results   Component Value Date     137 0 (H) 09/16/2021     Lab Results   Component Value Date    K 4 1 09/16/2021    CL 93 (L) 09/16/2021    CO2 27 09/16/2021    BUN 15 09/16/2021    CREATININE 0 79 09/16/2021    GLUF 85 08/26/2021    CALCIUM 7 6 (L) 09/16/2021    CORRECTEDCA 9 7 09/16/2021    AST 30 09/16/2021    ALT 26 09/16/2021 ALKPHOS 142 (H) 09/16/2021    EGFR 75 09/16/2021     Lab Results   Component Value Date    WBC 6 16 09/16/2021    HGB 10 4 (L) 09/16/2021    HCT 30 8 (L) 09/16/2021    MCV 80 (L) 09/16/2021     (L) 09/16/2021     Lab Results   Component Value Date    NEUTROABS 5 00 09/05/2021        Trend:  Lab Results   Component Value Date     137 0 (H) 09/16/2021     189 3 (H) 08/26/2021     63 6 (H) 08/05/2021     71 0 (H) 07/12/2021

## 2021-09-22 NOTE — TELEPHONE ENCOUNTER
Patient's pharmacy called regarding this prescription  The patient already had a prescription for the liquid form that was filled and given to her  Dr Linda Mann has ordered the same scrip in tablet form  They would like to know if the tablets can wait until the next re-fill or should they be given now  Can you please call the pharmacy in file?

## 2021-09-23 ENCOUNTER — TELEPHONE (OUTPATIENT)
Dept: GYNECOLOGIC ONCOLOGY | Facility: CLINIC | Age: 71
End: 2021-09-23

## 2021-09-23 ENCOUNTER — HOSPITAL ENCOUNTER (OUTPATIENT)
Dept: INFUSION CENTER | Facility: CLINIC | Age: 71
Discharge: HOME | End: 2021-09-23

## 2021-09-23 ENCOUNTER — TELEPHONE (OUTPATIENT)
Dept: HEMATOLOGY ONCOLOGY | Facility: CLINIC | Age: 71
End: 2021-09-23

## 2021-09-23 NOTE — TELEPHONE ENCOUNTER
Pt calling asking if she can speak directly to [facility], [facility]  She has questions about her upcoming treatment

## 2021-09-23 NOTE — TELEPHONE ENCOUNTER
Dr Luca Mccarty will virtually consent her via phone on Monday morning on her arrival to the infusion center  We will start Doxil/Avastin that day, 9/27

## 2021-09-23 NOTE — TELEPHONE ENCOUNTER
Katlyn has decided to move forward with Doxil + Avastin as discussed at her visit yesterday  She is going to come in next week to sign consents  Will enter treatment plan and get her scheduled to start ASAP as she is progressing rapidly

## 2021-09-23 NOTE — TELEPHONE ENCOUNTER
Thanks  I already discussed risks and benefits and cave them the info sheets  We can consent by phone and just treat her asap

## 2021-09-24 ENCOUNTER — HOSPITAL ENCOUNTER (OUTPATIENT)
Dept: INFUSION CENTER | Facility: CLINIC | Age: 71
Discharge: HOME | End: 2021-09-24
Payer: MEDICARE

## 2021-09-24 VITALS — TEMPERATURE: 97 F

## 2021-09-24 DIAGNOSIS — C54.1 ENDOMETRIAL CANCER (HCC): Primary | ICD-10-CM

## 2021-09-24 DIAGNOSIS — Z45.2 ENCOUNTER FOR CENTRAL LINE CARE: ICD-10-CM

## 2021-09-24 LAB
ALBUMIN SERPL BCP-MCNC: 1.3 G/DL (ref 3.5–5)
ALP SERPL-CCNC: 130 U/L (ref 46–116)
ALT SERPL W P-5'-P-CCNC: 21 U/L (ref 12–78)
ANION GAP SERPL CALCULATED.3IONS-SCNC: 7 MMOL/L (ref 4–13)
AST SERPL W P-5'-P-CCNC: 20 U/L (ref 5–45)
BASOPHILS # BLD AUTO: 0.01 THOUSANDS/ΜL (ref 0–0.1)
BASOPHILS NFR BLD AUTO: 0 % (ref 0–1)
BILIRUB SERPL-MCNC: 0.32 MG/DL (ref 0.2–1)
BUN SERPL-MCNC: 17 MG/DL (ref 5–25)
CALCIUM ALBUM COR SERPL-MCNC: 10.1 MG/DL (ref 8.3–10.1)
CALCIUM SERPL-MCNC: 7.9 MG/DL (ref 8.3–10.1)
CHLORIDE SERPL-SCNC: 91 MMOL/L (ref 100–108)
CO2 SERPL-SCNC: 25 MMOL/L (ref 21–32)
CREAT SERPL-MCNC: 0.73 MG/DL (ref 0.6–1.3)
EOSINOPHIL # BLD AUTO: 0 THOUSAND/ΜL (ref 0–0.61)
EOSINOPHIL NFR BLD AUTO: 0 % (ref 0–6)
ERYTHROCYTE [DISTWIDTH] IN BLOOD BY AUTOMATED COUNT: 14.5 % (ref 11.6–15.1)
GFR SERPL CREATININE-BSD FRML MDRD: 83 ML/MIN/1.73SQ M
GLUCOSE SERPL-MCNC: 92 MG/DL (ref 65–140)
HCT VFR BLD AUTO: 32.5 % (ref 34.8–46.1)
HGB BLD-MCNC: 11.1 G/DL (ref 11.5–15.4)
IMM GRANULOCYTES # BLD AUTO: 0.08 THOUSAND/UL (ref 0–0.2)
IMM GRANULOCYTES NFR BLD AUTO: 1 % (ref 0–2)
LYMPHOCYTES # BLD AUTO: 1.03 THOUSANDS/ΜL (ref 0.6–4.47)
LYMPHOCYTES NFR BLD AUTO: 10 % (ref 14–44)
MAGNESIUM SERPL-MCNC: 2.1 MG/DL (ref 1.6–2.6)
MCH RBC QN AUTO: 26.2 PG (ref 26.8–34.3)
MCHC RBC AUTO-ENTMCNC: 34.2 G/DL (ref 31.4–37.4)
MCV RBC AUTO: 77 FL (ref 82–98)
MONOCYTES # BLD AUTO: 0.51 THOUSAND/ΜL (ref 0.17–1.22)
MONOCYTES NFR BLD AUTO: 5 % (ref 4–12)
NEUTROPHILS # BLD AUTO: 8.32 THOUSANDS/ΜL (ref 1.85–7.62)
NEUTS SEG NFR BLD AUTO: 84 % (ref 43–75)
NRBC BLD AUTO-RTO: 0 /100 WBCS
PLATELET # BLD AUTO: 234 THOUSANDS/UL (ref 149–390)
PMV BLD AUTO: 9.6 FL (ref 8.9–12.7)
POTASSIUM SERPL-SCNC: 3.9 MMOL/L (ref 3.5–5.3)
PROT SERPL-MCNC: 5.8 G/DL (ref 6.4–8.2)
RBC # BLD AUTO: 4.24 MILLION/UL (ref 3.81–5.12)
SODIUM SERPL-SCNC: 123 MMOL/L (ref 136–145)
WBC # BLD AUTO: 9.95 THOUSAND/UL (ref 4.31–10.16)

## 2021-09-24 PROCEDURE — 85025 COMPLETE CBC W/AUTO DIFF WBC: CPT

## 2021-09-24 PROCEDURE — 80053 COMPREHEN METABOLIC PANEL: CPT

## 2021-09-24 PROCEDURE — 83735 ASSAY OF MAGNESIUM: CPT

## 2021-09-24 RX ORDER — SODIUM CHLORIDE 9 MG/ML
20 INJECTION, SOLUTION INTRAVENOUS ONCE
Status: CANCELLED | OUTPATIENT
Start: 2021-10-15

## 2021-09-24 RX ORDER — DEXTROSE MONOHYDRATE 50 MG/ML
20 INJECTION, SOLUTION INTRAVENOUS ONCE
Status: CANCELLED | OUTPATIENT
Start: 2021-09-27

## 2021-09-24 RX ORDER — SODIUM CHLORIDE 9 MG/ML
20 INJECTION, SOLUTION INTRAVENOUS ONCE
Status: CANCELLED | OUTPATIENT
Start: 2021-09-27

## 2021-09-24 NOTE — PROGRESS NOTES
Patient has decided to move forward with aggressive treatment  Chemo will be dosed at Doxil 40mg/m2 and Avastin 10mg/kg

## 2021-09-27 ENCOUNTER — TELEMEDICINE (OUTPATIENT)
Dept: GYNECOLOGIC ONCOLOGY | Facility: CLINIC | Age: 71
End: 2021-09-27
Payer: MEDICARE

## 2021-09-27 ENCOUNTER — NUTRITION (OUTPATIENT)
Dept: NUTRITION | Facility: CLINIC | Age: 71
End: 2021-09-27

## 2021-09-27 ENCOUNTER — HOSPITAL ENCOUNTER (OUTPATIENT)
Dept: INFUSION CENTER | Facility: CLINIC | Age: 71
Discharge: HOME | DRG: 393 | End: 2021-09-27
Payer: MEDICARE

## 2021-09-27 ENCOUNTER — PATIENT OUTREACH (OUTPATIENT)
Dept: CASE MANAGEMENT | Facility: HOSPITAL | Age: 71
End: 2021-09-27

## 2021-09-27 VITALS
RESPIRATION RATE: 18 BRPM | BODY MASS INDEX: 17.04 KG/M2 | WEIGHT: 102.29 LBS | SYSTOLIC BLOOD PRESSURE: 116 MMHG | DIASTOLIC BLOOD PRESSURE: 59 MMHG | HEART RATE: 123 BPM | HEIGHT: 65 IN | TEMPERATURE: 96.7 F

## 2021-09-27 DIAGNOSIS — C54.1 ENDOMETRIAL CANCER (HCC): Primary | ICD-10-CM

## 2021-09-27 DIAGNOSIS — Z71.3 NUTRITIONAL COUNSELING: Primary | ICD-10-CM

## 2021-09-27 PROCEDURE — 99213 OFFICE O/P EST LOW 20 MIN: CPT | Performed by: OBSTETRICS & GYNECOLOGY

## 2021-09-27 PROCEDURE — 96417 CHEMO IV INFUS EACH ADDL SEQ: CPT

## 2021-09-27 PROCEDURE — 96367 TX/PROPH/DG ADDL SEQ IV INF: CPT

## 2021-09-27 PROCEDURE — 96413 CHEMO IV INFUSION 1 HR: CPT

## 2021-09-27 RX ORDER — SODIUM CHLORIDE 9 MG/ML
20 INJECTION, SOLUTION INTRAVENOUS ONCE
Status: COMPLETED | OUTPATIENT
Start: 2021-09-27 | End: 2021-09-27

## 2021-09-27 RX ORDER — DEXTROSE MONOHYDRATE 50 MG/ML
20 INJECTION, SOLUTION INTRAVENOUS ONCE
Status: COMPLETED | OUTPATIENT
Start: 2021-09-27 | End: 2021-09-27

## 2021-09-27 RX ADMIN — SODIUM CHLORIDE 20 ML/HR: 0.9 INJECTION, SOLUTION INTRAVENOUS at 08:24

## 2021-09-27 RX ADMIN — BEVACIZUMAB 482.5 MG: 400 INJECTION, SOLUTION INTRAVENOUS at 11:00

## 2021-09-27 RX ADMIN — DEXAMETHASONE SODIUM PHOSPHATE 10 MG: 10 INJECTION, SOLUTION INTRAMUSCULAR; INTRAVENOUS at 08:50

## 2021-09-27 RX ADMIN — DOXORUBICIN HYDROCHLORIDE 60 MG: 2 INJECTABLE, LIPOSOMAL INTRAVENOUS at 09:41

## 2021-09-27 RX ADMIN — DEXTROSE 20 ML/HR: 5 SOLUTION INTRAVENOUS at 09:36

## 2021-09-27 NOTE — ASSESSMENT & PLAN NOTE
We will plan to move ahead with Doxil and Avastin today  Doxil will be dosed at 40 milligrams/ meter squared  Every 4 weeks and Avastin will be dosed at 10 milligrams/kilogram every 2 weeks  We will not plan to use Neulasta at this time    Patient understands this is a palliative regimen

## 2021-09-27 NOTE — PROGRESS NOTES
Outpatient Oncology Nutrition Consultation   Type of Consult: Follow Up  Care Location: [facility]    Reason for referral: Karina Green on 8/24/21 (reason for referral: patient request due to desires discussion regarding her diet while on chemotherapy)  Nutrition Assessment:   Oncology Diagnosis & Treatments: Diagnosed with endometrial cancer 5/2021, s/p total laparoscopic hysterectomy and bilateral salpingo-oophrectomy 5/2021  Chemotherapy (aloxi, emend, carboplatin, taxol) 7/19/21-9/9/21  Current chemo (avastin, doxil) began 9/27/21  Oncology History   Endometrial cancer ([facility] [address] )   5/15/2021 Initial Diagnosis    Endometrial cancer ([facility] [address] )     5/19/2021 -  Cancer Staged    Staging form: Corpus Uteri - Carcinoma, AJCC 8th Edition  - Clinical: FIGO Stage I (cT1, cN0, cM0) - Signed by Hiren Scott MD on 5/19/2021  Histologic grade (G): G3  Histologic grading system: 3 grade system       5/2021 Surgery      Robotically assisted total laparoscopic hysterectomy bilateral salpingo-oophorectomy and sentinel lymph node mapping and biopsy for stage III C1 grade 3 endometrial cancer with 17 of 18 mm invasion, cervical stromal involvement and bilateral positive nodes in the pelvis  Patient has also had small 2-5 mm pulmonary nodules which are too small to characterize as   Metastatic disease  The patient has a history of multiple pneumonias as a child       6/16/2021 -  Cancer Staged    Staging form: Corpus Uteri - Carcinoma, AJCC 8th Edition  - Pathologic: FIGO Stage IIIC1 (pT2, pN1, cM0) - Signed by Hiren Scott MD on 6/16/2021  Histologic grade (G): G3  Histologic grading system: 3 grade system  Residual tumor (R): R0 - None       7/19/2021 - 9/28/2021 Chemotherapy    palonosetron (ALOXI), 0 25 mg, Intravenous, Once, 3 of 6 cycles  Administration: 0 25 mg (7/19/2021), 0 25 mg (8/16/2021), 0 25 mg (9/9/2021)  fosaprepitant (EMEND) IVPB, 150 mg, Intravenous, Once, 3 of 6 cycles  Administration: 150 mg (7/19/2021), 150 mg (8/16/2021), 150 mg (9/9/2021)  CARBOplatin (PARAPLATIN) IVPB (GOG AUC DOSING), 436 mg, Intravenous, Once, 3 of 6 cycles  Administration: 436 mg (7/19/2021), 280 4 mg (8/16/2021), 334 mg (9/9/2021)  PACLItaxel (TAXOL) chemo IVPB, 135 mg/m2 = 220 2 mg (77 1 % of original dose 175 mg/m2), Intravenous, Once, 3 of 6 cycles  Dose modification: 135 mg/m2 (original dose 175 mg/m2, Cycle 1, Reason: Dose modified as per discussion with consulting physician), 110 mg/m2 (original dose 175 mg/m2, Cycle 2, Reason: Dose Not Tolerated, Comment: Neuropathy and pain)  Administration: 220 2 mg (7/19/2021), 177 mg (8/16/2021), 179 4 mg (9/9/2021)     8/2021 Genomic Testing    Insight testing performed      This reveals KRAS mutation, RADr1c (data for 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 of olaparib) PDL-1 for a tumor proportions score of 70%     9/27/2021 -  Chemotherapy    bevacizumab (AVASTIN) IVPB, 10 mg/kg = 482 5 mg, Intravenous, Once, 1 of 4 cycles  DOXOrubicin liposomal (DOXIL) chemo infusion, 40 mg/m2 = 60 4 mg, Intravenous, Once, 1 of 4 cycles       Past Medical & Surgical Hx:   Patient Active Problem List   Diagnosis    Osteoporosis of multiple sites    Encounter for gynecological examination without abnormal finding    PMB (postmenopausal bleeding)    H/O uterine prolapse    Endometrial cancer ([facility] [address] )    History of robot-assisted laparoscopic hysterectomy, BSO, SLND    Encounter for central line care    DVT (deep venous thrombosis) (HCC)    SOB (shortness of breath)     Past Medical History:   Diagnosis Date    Breast cancer ([facility] [address] [facility]     Cancer ([facility] [address] )     Endometrial    History of transfusion 2018    PONV (postoperative nausea and vomiting)      Past Surgical History:   Procedure Laterality Date    BREAST LUMPECTOMY Right 2007    COLONOSCOPY      DILATION AND CURETTAGE OF UTERUS      FEMUR SURGERY Right 2018    FL GUIDED CENTRAL VENOUS ACCESS DEVICE INSERTION  7/13/2021    HYSTERECTOMY      IR PARACENTESIS  9/4/2021    IR PARACENTESIS  9/10/2021    IR PARACENTESIS  9/15/2021    NH INSJ TUNNELED CTR VAD W/SUBQ PORT AGE 5 YR/> N/A 7/13/2021    Procedure: INSERTION VENOUS PORT ( PORT-A-CATH) IR;  Surgeon: Mignon Odom DO;  [facility];  Service: Interventional Radiology    NH LAPAROSCOPY W TOT HYSTERECTUTERUS <=250 GRAM  W TUBE/OVARY N/A 6/3/2021    Procedure: ROBOT ASSISTED TOTAL LAPAROSCOPIC HYSTERECTOMY, BILATERAL SALPINGO-OOPHORECTOMY, SENTINEL LYMPH NODE MAPPING AND BIOPSY;  Surgeon: Tomás Connolly MD;  [facility];  Service: Gynecology Oncology    TUBAL LIGATION         Review of Medications:   Vitamins, Supplements and Herbals: Med List Reviewed & pt is only taking: probiotic, vitamin D, cranberry     Current Outpatient Medications:     acetaminophen (TYLENOL) 500 mg tablet, Take 500 mg by mouth every 6 (six) hours as needed for mild pain, Disp: , Rfl:     apixaban (ELIQUIS) 5 mg, Take 2 tablets (10 mg total) by mouth 2 (two) times a day for 7 days, THEN 1 tablet (5 mg total) 2 (two) times a day for 23 days  , Disp: 74 tablet, Rfl: 0    Ascorbic Acid (vitamin C) 1000 MG tablet, Take 1,000 mg by mouth daily, Disp: , Rfl:     azelastine (OPTIVAR) 0 05 % ophthalmic solution, Administer 1 drop to both eyes daily as needed, Disp: , Rfl:     Calcium Carb-Cholecalciferol (CALCIUM 1000 + D PO), Take 2,000 Units by mouth  , Disp: , Rfl:     Cholecalciferol (Vitamin D3) 25 MCG (1000 UT) CAPS, Take 1 capsule by mouth 2 (two) times a day, Disp: , Rfl:     Cranberry 1000 MG CAPS, Take by mouth, Disp: , Rfl:     diphenhydrAMINE HCl (BENADRYL ALLERGY PO), Take by mouth, Disp: , Rfl:     LORazepam (ATIVAN) 1 mg tablet, Take 1 tablet (1 mg total) by mouth every 6 (six) hours as needed for anxiety (or nausea) (Patient not taking: Reported on 9/4/2021), Disp: 36 tablet, Rfl: 1    LUTEIN PO, Take 1 tablet by mouth daily, Disp: , Rfl:     megestrol (MEGACE) 40 mg tablet, Two tablets by mouth twice daily, Disp: 120 tablet, Rfl: 2    megestrol (MEGACE) 40 MG/ML suspension, take 10 milliliters twice a day, Disp: , Rfl:     metroNIDAZOLE (METROCREAM) 0 75 % cream, Apply topically 2 (two) times a day, Disp: , Rfl:     naloxone (NARCAN) 4 mg/0 1 mL nasal spray, Administer 1 spray into a nostril  If no response after 2-3 minutes, give another dose in the other nostril using a new spray  (Patient not taking: Reported on 9/4/2021), Disp: 1 each, Rfl: 1    nystatin (MYCOSTATIN) 500,000 units/5 mL suspension, Apply 5 mL (500,000 Units total) to the mouth or throat 4 (four) times a day, Disp: 473 mL, Rfl: 1    omeprazole (PriLOSEC) 20 mg delayed release capsule, Take 1 capsule (20 mg total) by mouth daily, Disp: 30 capsule, Rfl: 1    ondansetron (ZOFRAN) 8 mg tablet, Take 1 tablet (8 mg total) by mouth every 8 (eight) hours as needed for nausea or vomiting, Disp: 30 tablet, Rfl: 1    oxyCODONE (ROXICODONE) 5 mg immediate release tablet, 1 -2 tabs by mouth every 4-6 hour as needed (Patient not taking: Reported on 9/4/2021), Disp: 10 tablet, Rfl: 0    Probiotic Product (CULTURELLE PROBIOTICS PO), Take by mouth, Disp: , Rfl:     prochlorperazine (COMPAZINE) 10 mg tablet, Take 1 tablet (10 mg total) by mouth every 6 (six) hours as needed for nausea or vomiting, Disp: 30 tablet, Rfl: 0    vitamin E, tocopherol, 1,000 units capsule, Take 1,000 Units by mouth daily, Disp: , Rfl:     VITAMIN K PO, Take 50 mcg by mouth daily  (Patient not taking: Reported on 9/4/2021), Disp: , Rfl:     Vitamins A & D (VITAMIN A & D PO), Take by mouth, Disp: , Rfl:   No current facility-administered medications for this visit      Facility-Administered Medications Ordered in Other Visits:     bevacizumab (AVASTIN) 482 5 mg in sodium chloride 0 9 % 100 mL IVPB, 10 mg/kg (Treatment Plan Recorded), Intravenous, Once, Mushtaq Camilo MD    DOXOrubicin liposome (DOXIL) 60 mg in dextrose 5 % 250 mL chemo infusion, 60 mg, Intravenous, Once, Kiko Matthews MD, Last Rate: 280 mL/hr at 09/27/21 0941, 60 mg at 09/27/21 0941    Most Recent Lab Results:   Lab Results   Component Value Date    WBC 9 95 09/24/2021    NEUTROABS 8 32 (H) 09/24/2021    ALT 21 09/24/2021    AST 20 09/24/2021    ALB 1 3 (L) 09/24/2021    SODIUM 123 (L) 09/24/2021    SODIUM 125 (L) 09/16/2021    K 3 9 09/24/2021    K 4 1 09/16/2021    CL 91 (L) 09/24/2021    BUN 17 09/24/2021    BUN 15 09/16/2021    CREATININE 0 73 09/24/2021    CREATININE 0 79 09/16/2021    EGFR 83 09/24/2021    GLUF 85 08/26/2021    GLUF 98 05/21/2021    GLUC 92 09/24/2021    HGBA1C 5 9 (H) 05/21/2021    HGBA1C 5 6 03/16/2017    CALCIUM 7 9 (L) 09/24/2021    MG 2 1 09/24/2021       Anthropometric Measurements:   Height: 64"  Ht Readings from Last 1 Encounters:   09/27/21 5' 4 57" (1 64 m)     Wt Readings from Last 20 Encounters:   09/27/21 46 4 kg (102 lb 4 7 oz)   09/22/21 48 3 kg (106 lb 8 oz)   09/09/21 57 2 kg (126 lb 1 7 oz)   09/04/21 55 8 kg (123 lb 0 3 oz)   09/01/21 58 3 kg (128 lb 8 oz)   08/16/21 56 kg (123 lb 7 3 oz)   08/04/21 56 2 kg (124 lb)   07/19/21 54 8 kg (120 lb 13 oz)   07/08/21 55 8 kg (123 lb)   06/30/21 55 8 kg (123 lb)   06/16/21 54 4 kg (120 lb)   06/03/21 54 9 kg (121 lb)   05/19/21 55 1 kg (121 lb 8 oz)   05/07/21 56 kg (123 lb 6 4 oz)   09/14/20 56 2 kg (124 lb)   08/31/20 55 9 kg (123 lb 5 oz)   09/05/18 56 8 kg (125 lb 4 oz)       Weight History:    Usual Weight: 120-124#   Varian: n/a   Home Scale: doesn't have home scale    Oncology Nutrition-Anthropometrics      Nutrition from 9/27/2021 in [facility] Nutrition from 9/9/2021 in [facility] Dietitian Kerrick   Patient age (years):  66 years  66 years   Patient (female) height (in):  59 in  59 in   Current Weight to be used for anthropometric calculations (lbs)  102 3 lbs  126 1 lbs   Current Weight to be used for anthropometric calculations (kg)  46 5 kg 57 3 kg   BMI:  17 6  21 6   IBW female:  120 lbs  120 lbs   IBW (kg) female:  54 5 kg  54 5 kg   IBW % (female)  85 2 %  105 1 %   Adjusted BW (female):  115 6 lbs  121 5 lbs   Adjusted BW kg (female):  52 5 kg  55 2 kg   % weight change after 1 week:  --  -1 9 %   Weight change after 1 week (lbs)  --  -2 4 lbs   % weight change after 1 month:  (!) -17 2 %  1 7 %   Weight change after 1 month (lbs)  -21 2 lbs  2 1 lbs   % weight change after 3 months:  (!) -16 8 %  4 2 %   Weight change after 3 months (lbs)  -20 7 lbs  5 1 lbs   % weight change after 1 year:  -17 5 %  0 6 %   Weight change after 1 year (lbs)  -21 7 lbs  0 8 lbs      **pt is s/p paracentesis 9/10 and 9/15    Nutrition-Focused Physical Findings: none observed    Food/Nutrition-Related History & Client/Social History:    Current Nutrition Impact Symptoms:  [x] Nausea -zofran helps  [x] Reduced Appetite  [] Acid Reflux    [] Vomiting  [x] Unintended Wt Loss  [] Malabsorption    [] Diarrhea  [] Unintended Wt Gain  [] Dumping Syndrome    [] Constipation  [] Thick Mucous/Secretions  [] Abdominal Pain    [x] Dysgeusia (Altered Taste)- cardboard   [] Xerostomia (Dry Mouth)  [] Gas    [] Dysosmia (Altered Smell)  [] Thrush  [] Difficulty Chewing    [] Oral Mucositis (Sore Mouth)  [x] Fatigue  [] Hyperglycemia    [] Odynophagia  [] Esophagitis  [] Other:    [] Dysphagia  [] Early Satiety  [] No Problems Eating      Food Allergies & Intolerances: yes: dairy products     Current Diet: Lactose-Free  Current Nutrition Intake: Decreased since last visit  Appetite: Poor  Nutrition Route: PO  Oral Care: brushes BID with baking soda toothpaste    Activity level: ADL     24 Hr Diet Recall:   Breakfast: cream of wheat made with almond milk and butter OR 1/2 egg and 1 slice toast with butter    Snack: Natali Laura biscuit   Dinner: 1/4 container egg drop soup   Snack: ritz crackers     Beverages: water (8oz x2-3), decaf black tea (12oz x1)   Supplements:    Homemade Smoothies/Shakes ; powdered egg protein and almond milk -has not had for the past few days       Oncology Nutrition-Estimated Needs      Nutrition from 2021 in [facility] Nutrition from 2021 in [facility]   Weight type used  Actual weight  Actual weight   Weight in kilograms (kg) used for estimated needs  --  57 3 kg   Weight in kilograms (kg) used for estimated needs  46 5 kg  --   Energy needs formula:   35-40 kcal/kg  30-35 kcal/kg   Energy needs based on 30 kcal/kg:  --  1720 kcal   Energy needs based on 35 kcal/kg:  --  2006 kcal   Energy needs based on 35 kcal/k kcal  --   Energy needs based on 40 kcal/k kcal  --   Protein needs formula:  1 2-1 5 g/kg  1 2-1 5 g/kg   Protein needs based on 1 2 g/k g  69 g   Protein needs based on 1 5 g/kg  70 g  86 g   Fluid needs formula:  30-35 mL/kg  30-35 mL/kg   Fluid needs based on 30 mL/kg  1392 mL  1719 mL   Fluid needs in ounces  47 oz  58 oz   Fluid needs based on 35 mL/kg  1624 mL  2006 mL   Fluid needs in ounces  55 oz  68 oz           Discussion & Intervention:   Katlyn was evaluated today for an RD follow up regarding guidance on diet during cancer treatment   Katlyn is currently undergoing tx for endometrial cancer  Today Cj Cordon explains that she has been struggling to consume adequate nutrition  Her appetite is decreased and she has only been taking small bites of foods  She is s/p paracentesis on 9/10 and 9/15  Reviewed 24 hour recall, which revealed an inadequate po intake, and discussed ways to optimize nutrient intake and increase fluid intake  Encouraged Owen to continue her homemade smoothies and increase frequency to aid in calorie and protein intake  She has not been having these for the past few days  Also discussed adding egg protein powder to other foods such as her cream of wheat for added calories and protein        Additional discussion included: MNT for: weight management, how to modify foods for anticipated nutrition impact symptoms pt may experience during CA tx, adequate hydration & fluid choices, sipping on calorie containing beverages (examples include: adding whole milk or cream to coffee, oral nutrition supplements, juice, electrolyte replacement beverages, milk, etc ), eating smaller more frequent meals every 2-3 hours (5-6 small meals/day), eating when feeling most hungry and consuming foods that are easy on the stomach like crackers, toast, cream of wheat, eggs  Moving forward, Katlyn was encouraged to increase kcal, protein, and fluid intakes   Materials Provided: declined (Ensure Clear)   All questions and concerns addressed during todays visit  Katlyn has RD contact information  Nutrition Diagnosis:    Inadequate Energy Intake related to physiological causes, disease state and treatment related issues as evidenced by food recall, wt loss and discussion with pt and/or family   Increased Nutrient Needs (kcal & pro) related to increased demand for nutrients and disease state as evidenced by cancer dx and pt undergoing tx for cancer   Patient has clinical indicators (or ASPEN criteria) consistent with severe protein-calorie malnutrition in the context of Chronic Illness as evidenced by >5% wt loss in 1 month and </=75% energy intake vs  Estimated needs for >/=1 month  Monitoring & Evaluation:   Goals:  · weight maintenance/stabilization  · adequate nutrition impact symptom management  · pt to meet >/=75% estimated nutrition needs daily    · Progress Towards Goals: Not Progressing    Nutrition Rx & Recommendations:  · Diet: High Calorie, High Protein (for high calorie foods see pages 52-53, and for high protein foods see pages 49-51 in your Eating Hints book)  · Small, frequent meals/snacks may be easier to tolerate than 3 large daily meals  Aim for 5-6 small meals per day (every 2-3 hours)    · Include protein at all meals/snacks  · Include a variety of foods (as tolerated/allowed by diet)  · Incorporate physical activity as able/allowed  · Stay hydrated by sipping fluids of choice/tolerance throughout the day  · Liquid nutrition may be better tolerated than solids at times  · Alter food choices and eating patterns to accommodate changing needs  · Refer to Eating Hints book for other meal/snack ideas and symptom management  · For lack of taste: Practice good oral care  Blend fresh fruits into shakes, ice cream or yogurt  Eat frozen fruits: grapes, chopped cantaloupe, watermelon  Select fresh vegetables (may be more appealing than canned/frozen)  Add extra flavor to foods: experiment with spices, salt & sweeteners, extra oil/butter, acidic foods; marinate meats (see pages 29-30 in your Eating Hints book)  · For appetite loss: try powdered or liquid nutrition supplements; eating by the clock every 2-3 hours; set a timer to remind yourself to eat, keep snacks nearby; add extra protein & calories to your diet; drink liquids in between meals, choose liquids that add calories; eat a bedtime snack; eat soft, cool or frozen foods; eat a larger meal when you feel well & rested; only sip small amounts of liquids during meals (see pages 10-12 in your Eating Hints book)    · For weight loss: monitor your weight at home at least 2x/week, record your weight, start by adding 250-500 extra calories per day, eat 5-6 small scheduled meals every 2-3 hrs, choose foods that are high in protein and calories (see pages 49-53 in your Eating Hints book), drink liquids with calories for example: milkshakes/smoothies/juice/soup/whole milk/chocolate milk, cook with protein fortified milk (see recipe on page 36 in your Eating Hints book), consider ready-to-drink oral nutrition supplements such as Ensure Plus, Ensure Enlive, Boost Plus, or Boost Very High Calorie, avoid "diet" and "light" foods when possible, avoid drinking too much with meals, contact your dietitian with any continued weight loss over the course of 1 week  For more info see pages 35-37 in your Eating Hints book  · For nausea/vomiting: try plain/bland foods; try lemon/lime flavors; eat 5-6 small meals/day (except when vomiting); do not skip meals/snacks; choose appealing foods; sip only small amounts of liquids during meals; stay hydrated in between meals; eat foods/drinks that are room temperature; eat dry toast/crackers (see pages  21-22 and 31-32 in your Eating Hints book)  · Weigh yourself regularly  If you notice weight loss, make an effort to increase your daily food/calorie intake  If you continue to notice loss after these efforts, reach out to your dietitian to establish a plan to stabilize weight  · Avoid gas-producing foods such as broccoli, cabbage, sauerkraut, [place] sprouts, cauliflower, corn, cucumbers, onions, peppers, beans, peas, lentils, apples, apple juice, avocado, and melons  Carbonated drinks, chewing gum, and drinking through a straw can also cause gas  Limiting lactose may help for those who are sensitive to milk products  · Continue homemade smoothies, increase frequency to better aid in calorie and protein intake  Can add egg protein powder to other foods like cream of wheat  Foods and beverages that are bland/easy on the stomach: clear broth (Chicken, vegetable, bone, beef), potatoes, pretzels, crackers, cereal such as corn flakes/Rice Chex/Rice Krispies/Cheerios, oatmeal or cream of wheat, white bread or toast, white rice, cooked vegetables, plain pasta, eggs, peanut butter, boiled chicken or turkey, soft cheeses  Foods to avoid: spicy, fried/greasy, high in sugar, acidic       Follow Up Plan: 10/29/21 during infusion   Recommend Referral to Other Providers: none at this time

## 2021-09-27 NOTE — PROGRESS NOTES
Spent time with pt in the infusion center today, she shared that her previous tx was not working as well as had been hoped and she is starting a new tx today  She shared how she has struggled with managing side effects, and that she is hopeful that this new tx will be easier for her to tolerate  She says that she is exhausted almost always and finds herself unable to do much but sleep  She says that even talking for awhile leaves her feeling winded and like she needs to catch her breath  She complains of being unable to taste anything, and she is doing her best to maintain her nutrition  We talked at length about the realities of going through cancer tx vs what you expect it to be like, and how hard it can be on your bodies  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, and became emotional when sharing that she feels guilty for not being able to work and help out throughout [place]  She did not initially share her dx with her coworkers, but has since, and she says that they are all very supportive to her and understand why she has been unable to work at all  She agreed that the loss of her normal routine and the social aspect of her job has been very difficult for her  She was very appreciative of the time spent talking today and thanked me for listening to her  I left her to get some rest but will see her again at her next appointment  No other needs at this time

## 2021-09-27 NOTE — PROGRESS NOTES
Pt to clinic for initial doxil and avastin treatment, offers no complaints today, chemotherapy consent obtained, pt tolerated infusion without complications, spoke with Cisco Garibay NP regarding pt's need for date change in treatment plan, per Kaitlin Mendoza pt okay to receive treatment today and no need for a date change on her remaining treatment days, pt is okay to receive chemotherapy 4 days last on 10/15, pt aware of next appointment, port de-accessed, avs declined

## 2021-09-27 NOTE — PATIENT INSTRUCTIONS
Nutrition Rx & Recommendations:  · Diet: High Calorie, High Protein (for high calorie foods see pages 52-53, and for high protein foods see pages 49-51 in your Eating Hints book)  · Small, frequent meals/snacks may be easier to tolerate than 3 large daily meals  Aim for 5-6 small meals per day (every 2-3 hours)  · Include protein at all meals/snacks  · Include a variety of foods (as tolerated/allowed by diet)  · Incorporate physical activity as able/allowed  · Stay hydrated by sipping fluids of choice/tolerance throughout the day  · Liquid nutrition may be better tolerated than solids at times  · Alter food choices and eating patterns to accommodate changing needs  · Refer to Eating Hints book for other meal/snack ideas and symptom management  · For lack of taste: Practice good oral care  Blend fresh fruits into shakes, ice cream or yogurt  Eat frozen fruits: grapes, chopped cantaloupe, watermelon  Select fresh vegetables (may be more appealing than canned/frozen)  Add extra flavor to foods: experiment with spices, salt & sweeteners, extra oil/butter, acidic foods; marinate meats (see pages 29-30 in your Eating Hints book)  · For appetite loss: try powdered or liquid nutrition supplements; eating by the clock every 2-3 hours; set a timer to remind yourself to eat, keep snacks nearby; add extra protein & calories to your diet; drink liquids in between meals, choose liquids that add calories; eat a bedtime snack; eat soft, cool or frozen foods; eat a larger meal when you feel well & rested; only sip small amounts of liquids during meals (see pages 10-12 in your Eating Hints book)    · For weight loss: monitor your weight at home at least 2x/week, record your weight, start by adding 250-500 extra calories per day, eat 5-6 small scheduled meals every 2-3 hrs, choose foods that are high in protein and calories (see pages 49-53 in your Eating Hints book), drink liquids with calories for example: milkshakes/smoothies/juice/soup/whole milk/chocolate milk, cook with protein fortified milk (see recipe on page 36 in your Eating Hints book), consider ready-to-drink oral nutrition supplements such as Ensure Plus, Ensure Enlive, Boost Plus, or Boost Very High Calorie, avoid "diet" and "light" foods when possible, avoid drinking too much with meals, contact your dietitian with any continued weight loss over the course of 1 week  For more info see pages 35-37 in your Eating Hints book  · For nausea/vomiting: try plain/bland foods; try lemon/lime flavors; eat 5-6 small meals/day (except when vomiting); do not skip meals/snacks; choose appealing foods; sip only small amounts of liquids during meals; stay hydrated in between meals; eat foods/drinks that are room temperature; eat dry toast/crackers (see pages  21-22 and 31-32 in your Eating Hints book)  · Weigh yourself regularly  If you notice weight loss, make an effort to increase your daily food/calorie intake  If you continue to notice loss after these efforts, reach out to your dietitian to establish a plan to stabilize weight  · Avoid gas-producing foods such as broccoli, cabbage, sauerkraut, [place] sprouts, cauliflower, corn, cucumbers, onions, peppers, beans, peas, lentils, apples, apple juice, avocado, and melons  Carbonated drinks, chewing gum, and drinking through a straw can also cause gas  Limiting lactose may help for those who are sensitive to milk products  · Continue homemade smoothies, increase frequency to better aid in calorie and protein intake  Can add egg protein powder to other foods like cream of wheat    Foods and beverages that are bland/easy on the stomach: clear broth (Chicken, vegetable, bone, beef), potatoes, pretzels, crackers, cereal such as corn flakes/Rice Chex/Rice Krispies/Cheerios, oatmeal or cream of wheat, white bread or toast, white rice, cooked vegetables, plain pasta, eggs, peanut butter, boiled chicken or turkey, soft cheeses  Foods to avoid: spicy, fried/greasy, high in sugar, acidic       Follow Up Plan: 10/29/21 during infusion   Recommend Referral to Other Providers: none at this time

## 2021-09-27 NOTE — PROGRESS NOTES
Virtual Regular Visit    Verification of patient location:    Patient is located in the following state in which I hold an active license [place]      Assessment/Plan:    Problem List Items Addressed This Visit        Genitourinary    Endometrial cancer ([facility] [address] ) - Primary       We will plan to move ahead with Doxil and Avastin today  Doxil will be dosed at 40 milligrams/ meter squared  Every 4 weeks and Avastin will be dosed at 10 milligrams/kilogram every 2 weeks  We will not plan to use Neulasta at this time  Patient understands this is a palliative regimen                    Reason for visit is  Consenting for Doxil and Avastin for recurrent endometrial cancer  Chief Complaint   Patient presents with    Virtual Regular Visit        Encounter provider Fabrizio Gill MD    Provider located at [address] [id number]      Recent Visits  Date Type Provider Dept   09/22/21 Office Visit Fabrizio Gill MD [address] recent visits within past 7 days and meeting all other requirements  Today's Visits  Date Type Provider Dept   09/27/21 1111 [address] [place] [address], MD [address] today's visits and meeting all other requirements  Future Appointments  No visits were found meeting these conditions  Showing future appointments within next 150 days and meeting all other requirements       The patient was identified by name and date of birth  Katlyn Gordon was informed that this is a telemedicine visit and that the visit is being conducted through [facility] and patient was informed this is a secure, HIPAA-complaint platform  She agrees to proceed     My office door was closed  No one else was in the room  She acknowledged consent and understanding of privacy and security of the video platform   The patient has agreed to participate and understands they can discontinue the visit at any time  Patient is aware this is a billable service  Bobby Parker is a 66 y o  female    Patient is very pleasant 78-year-old female with history of stage III C1 grade 3 endometrial adenocarcinoma who has undergone surgery followed by 2 cycles of carboplatin paclitaxel  The patient has developed progression of disease as evidenced by ascites which is cytologically positive  We have recommended change in chemotherapy  I have seen the patient last week and recommended Doxil Avastin  Patient was unsure whether she could take it and opted for progesterone alone  In the interim she has changed her mind  I have previously given her information regarding the drugs as well as risks and benefits  I have gone over them this morning  The patient is interested in going ahead with chemotherapy  I have signed the chemotherapy consent forms and the patient will sign them as she is presently in the infusion area in [facility]  We will move ahead with a 28 day cycle  Doxil at 40 mg per m2 will be given day 1 along with a Avastin 10 milligrams/kilogram   Avastin will be reinfused on day 15 at 10 milligrams/kilogram     I have discussed with the patient risks and benefits including hand-foot syndrome potential hair loss low blood counts requiring transfusion and perhaps resulting in infection hypertension and protein leakage from kidneys  Patient understands accepts risks and has chosen to move ahead with treatment         Past Medical History:   Diagnosis Date    Breast cancer ([facility] [address] )     Cancer ([facility] [address] )     Endometrial    History of transfusion 2018    PONV (postoperative nausea and vomiting)        Past Surgical History:   Procedure Laterality Date    BREAST LUMPECTOMY Right 2007    COLONOSCOPY      DILATION AND CURETTAGE OF UTERUS      FEMUR SURGERY Right 2018    FL GUIDED CENTRAL VENOUS ACCESS DEVICE INSERTION  7/13/2021    HYSTERECTOMY      IR PARACENTESIS 9/4/2021    IR PARACENTESIS  9/10/2021    IR PARACENTESIS  9/15/2021    OH INSJ TUNNELED CTR VAD W/SUBQ PORT AGE 5 YR/> N/A 7/13/2021    Procedure: INSERTION VENOUS PORT ( PORT-A-CATH) IR;  Surgeon: Cristina Krause DO;  [facility];  Service: Interventional Radiology    OH LAPAROSCOPY W TOT HYSTERECTUTERUS <=250 GRAM  W TUBE/OVARY N/A 6/3/2021    Procedure: ROBOT ASSISTED TOTAL LAPAROSCOPIC HYSTERECTOMY, BILATERAL SALPINGO-OOPHORECTOMY, SENTINEL LYMPH NODE MAPPING AND BIOPSY;  Surgeon: Kiko Matthews MD;  [facility];  Service: Gynecology Oncology    TUBAL LIGATION         Current Outpatient Medications   Medication Sig Dispense Refill    acetaminophen (TYLENOL) 500 mg tablet Take 500 mg by mouth every 6 (six) hours as needed for mild pain      apixaban (ELIQUIS) 5 mg Take 2 tablets (10 mg total) by mouth 2 (two) times a day for 7 days, THEN 1 tablet (5 mg total) 2 (two) times a day for 23 days   74 tablet 0    Ascorbic Acid (vitamin C) 1000 MG tablet Take 1,000 mg by mouth daily      azelastine (OPTIVAR) 0 05 % ophthalmic solution Administer 1 drop to both eyes daily as needed      Calcium Carb-Cholecalciferol (CALCIUM 1000 + D PO) Take 2,000 Units by mouth        Cholecalciferol (Vitamin D3) 25 MCG (1000 UT) CAPS Take 1 capsule by mouth 2 (two) times a day      Cranberry 1000 MG CAPS Take by mouth      diphenhydrAMINE HCl (BENADRYL ALLERGY PO) Take by mouth      LORazepam (ATIVAN) 1 mg tablet Take 1 tablet (1 mg total) by mouth every 6 (six) hours as needed for anxiety (or nausea) (Patient not taking: Reported on 9/4/2021) 36 tablet 1    LUTEIN PO Take 1 tablet by mouth daily      megestrol (MEGACE) 40 mg tablet Two tablets by mouth twice daily 120 tablet 2    megestrol (MEGACE) 40 MG/ML suspension take 10 milliliters twice a day      metroNIDAZOLE (METROCREAM) 0 75 % cream Apply topically 2 (two) times a day      naloxone (NARCAN) 4 mg/0 1 mL nasal spray Administer 1 spray into a nostril  If no response after 2-3 minutes, give another dose in the other nostril using a new spray  (Patient not taking: Reported on 9/4/2021) 1 each 1    nystatin (MYCOSTATIN) 500,000 units/5 mL suspension Apply 5 mL (500,000 Units total) to the mouth or throat 4 (four) times a day 473 mL 1    omeprazole (PriLOSEC) 20 mg delayed release capsule Take 1 capsule (20 mg total) by mouth daily 30 capsule 1    ondansetron (ZOFRAN) 8 mg tablet Take 1 tablet (8 mg total) by mouth every 8 (eight) hours as needed for nausea or vomiting 30 tablet 1    oxyCODONE (ROXICODONE) 5 mg immediate release tablet 1 -2 tabs by mouth every 4-6 hour as needed (Patient not taking: Reported on 9/4/2021) 10 tablet 0    Probiotic Product (CULTURELLE PROBIOTICS PO) Take by mouth      prochlorperazine (COMPAZINE) 10 mg tablet Take 1 tablet (10 mg total) by mouth every 6 (six) hours as needed for nausea or vomiting 30 tablet 0    vitamin E, tocopherol, 1,000 units capsule Take 1,000 Units by mouth daily      VITAMIN K PO Take 50 mcg by mouth daily  (Patient not taking: Reported on 9/4/2021)      Vitamins A & D (VITAMIN A & D PO) Take by mouth       No current facility-administered medications for this visit  Allergies   Allergen Reactions    Milk-Related Compounds - Food Allergy GI Intolerance    Whey Protein [Protein] Other (See Comments) and GI Intolerance     Flu symptoms      Bactrim [Sulfamethoxazole-Trimethoprim] Hives    Iodine - Food Allergy Swelling    Shellfish Allergy - Food Allergy Vomiting    Cephalexin Rash    Levaquin [Levofloxacin] Rash       Review of Systems   Constitutional: Positive for appetite change and fatigue  HENT: Negative  Eyes: Negative  Respiratory: Negative  Cardiovascular: Negative  Gastrointestinal: Positive for abdominal distention  Endocrine: Negative  Genitourinary: Negative  Musculoskeletal: Negative  Skin: Negative  Neurological: Negative  Hematological: Negative  Psychiatric/Behavioral: Negative  Video Exam    There were no vitals filed for this visit  Physical Exam   It was my intent to perform this visit via video technology but the patient was not able to do a video connection so the visit was completed via audio telephone only  I spent 15 minutes with patient today in which greater than 50% of the time was spent in counseling/coordination of care regarding Consenting for chemotherapy with Doxil Avastin for endometrial cancer    VIRTUAL VISIT DISCLAIMER      Katlyn Gordon verbally agrees to participate in [employer]  Pt is aware that [employer] could be limited without vital signs or the ability to perform a full hands-on physical exam  Katlyn Gordon understands she or the provider may request at any time to terminate the video visit and request the patient to seek care or treatment in person

## 2021-09-28 ENCOUNTER — HOSPITAL ENCOUNTER (OUTPATIENT)
Dept: INTERVENTIONAL RADIOLOGY/VASCULAR | Facility: HOSPITAL | Age: 71
Discharge: HOME | DRG: 393 | End: 2021-09-28
Admitting: RADIOLOGY
Payer: MEDICARE

## 2021-09-28 VITALS
RESPIRATION RATE: 14 BRPM | SYSTOLIC BLOOD PRESSURE: 123 MMHG | OXYGEN SATURATION: 100 % | HEART RATE: 90 BPM | DIASTOLIC BLOOD PRESSURE: 69 MMHG

## 2021-09-28 DIAGNOSIS — R18.8 OTHER ASCITES: ICD-10-CM

## 2021-09-28 PROCEDURE — 0W9G3ZZ DRAINAGE OF PERITONEAL CAVITY, PERCUTANEOUS APPROACH: ICD-10-PCS | Performed by: RADIOLOGY

## 2021-09-28 PROCEDURE — 76705 ECHO EXAM OF ABDOMEN: CPT | Performed by: RADIOLOGY

## 2021-09-28 PROCEDURE — 49083 ABD PARACENTESIS W/IMAGING: CPT

## 2021-09-30 ENCOUNTER — HOSPITAL ENCOUNTER (INPATIENT)
Facility: HOSPITAL | Age: 71
LOS: 4 days | Discharge: HOSPICE | DRG: 393 | End: 2021-10-04
Attending: EMERGENCY MEDICINE | Admitting: INTERNAL MEDICINE
Payer: MEDICARE

## 2021-09-30 ENCOUNTER — TELEPHONE (OUTPATIENT)
Dept: HEMATOLOGY ONCOLOGY | Facility: CLINIC | Age: 71
End: 2021-09-30

## 2021-09-30 ENCOUNTER — APPOINTMENT (EMERGENCY)
Dept: RADIOLOGY | Facility: HOSPITAL | Age: 71
DRG: 393 | End: 2021-09-30
Payer: MEDICARE

## 2021-09-30 DIAGNOSIS — Z51.5 HOSPICE CARE: ICD-10-CM

## 2021-09-30 DIAGNOSIS — K66.8 PNEUMOPERITONEUM: ICD-10-CM

## 2021-09-30 DIAGNOSIS — E43 SEVERE PROTEIN-CALORIE MALNUTRITION (HCC): ICD-10-CM

## 2021-09-30 DIAGNOSIS — K63.1 BOWEL PERFORATION (HCC): ICD-10-CM

## 2021-09-30 DIAGNOSIS — C54.1 ENDOMETRIAL CANCER (HCC): ICD-10-CM

## 2021-09-30 DIAGNOSIS — I48.91 NEW ONSET A-FIB (HCC): Primary | ICD-10-CM

## 2021-09-30 PROBLEM — I26.99 PULMONARY EMBOLISM (HCC): Status: ACTIVE | Noted: 2021-09-30

## 2021-09-30 PROBLEM — E87.1 HYPONATREMIA: Status: ACTIVE | Noted: 2021-09-30

## 2021-09-30 PROBLEM — R00.0 SINUS TACHYCARDIA: Status: ACTIVE | Noted: 2021-09-30

## 2021-09-30 LAB
ALBUMIN SERPL BCP-MCNC: 1.4 G/DL (ref 3.5–5)
ALP SERPL-CCNC: 97 U/L (ref 46–116)
ALT SERPL W P-5'-P-CCNC: 23 U/L (ref 12–78)
ANION GAP SERPL CALCULATED.3IONS-SCNC: 11 MMOL/L (ref 4–13)
APTT PPP: 36 SECONDS (ref 23–37)
AST SERPL W P-5'-P-CCNC: 36 U/L (ref 5–45)
BASOPHILS # BLD MANUAL: 0 THOUSAND/UL (ref 0–0.1)
BASOPHILS NFR MAR MANUAL: 0 % (ref 0–1)
BILIRUB SERPL-MCNC: 0.52 MG/DL (ref 0.2–1)
BUN SERPL-MCNC: 35 MG/DL (ref 5–25)
CALCIUM ALBUM COR SERPL-MCNC: 10.7 MG/DL (ref 8.3–10.1)
CALCIUM SERPL-MCNC: 8.6 MG/DL (ref 8.3–10.1)
CHLORIDE SERPL-SCNC: 96 MMOL/L (ref 100–108)
CO2 SERPL-SCNC: 20 MMOL/L (ref 21–32)
CREAT SERPL-MCNC: 0.73 MG/DL (ref 0.6–1.3)
EOSINOPHIL # BLD MANUAL: 0 THOUSAND/UL (ref 0–0.4)
EOSINOPHIL NFR BLD MANUAL: 0 % (ref 0–6)
ERYTHROCYTE [DISTWIDTH] IN BLOOD BY AUTOMATED COUNT: 14.8 % (ref 11.6–15.1)
GFR SERPL CREATININE-BSD FRML MDRD: 83 ML/MIN/1.73SQ M
GLUCOSE SERPL-MCNC: 100 MG/DL (ref 65–140)
HCT VFR BLD AUTO: 39.9 % (ref 34.8–46.1)
HGB BLD-MCNC: 12.9 G/DL (ref 11.5–15.4)
INR PPP: 2.26 (ref 0.84–1.19)
LYMPHOCYTES # BLD AUTO: 0.8 THOUSAND/UL (ref 0.6–4.47)
LYMPHOCYTES # BLD AUTO: 16 % (ref 14–44)
MCH RBC QN AUTO: 25.7 PG (ref 26.8–34.3)
MCHC RBC AUTO-ENTMCNC: 32.3 G/DL (ref 31.4–37.4)
MCV RBC AUTO: 80 FL (ref 82–98)
MONOCYTES # BLD AUTO: 0.15 THOUSAND/UL (ref 0–1.22)
MONOCYTES NFR BLD: 3 % (ref 4–12)
NEUTROPHILS # BLD MANUAL: 4.05 THOUSAND/UL (ref 1.85–7.62)
NEUTS BAND NFR BLD MANUAL: 7 % (ref 0–8)
NEUTS SEG NFR BLD AUTO: 74 % (ref 43–75)
PLATELET # BLD AUTO: 151 THOUSANDS/UL (ref 149–390)
PLATELET BLD QL SMEAR: ADEQUATE
PMV BLD AUTO: 10 FL (ref 8.9–12.7)
POTASSIUM SERPL-SCNC: 4.8 MMOL/L (ref 3.5–5.3)
PROT SERPL-MCNC: 6.5 G/DL (ref 6.4–8.2)
PROTHROMBIN TIME: 23.8 SECONDS (ref 11.6–14.5)
RBC # BLD AUTO: 5.01 MILLION/UL (ref 3.81–5.12)
SODIUM SERPL-SCNC: 127 MMOL/L (ref 136–145)
TOXIC GRANULES BLD QL SMEAR: PRESENT
TROPONIN I SERPL-MCNC: 0.04 NG/ML
TSH SERPL DL<=0.05 MIU/L-ACNC: 3.87 UIU/ML (ref 0.36–3.74)
WBC # BLD AUTO: 5 THOUSAND/UL (ref 4.31–10.16)

## 2021-09-30 PROCEDURE — 99285 EMERGENCY DEPT VISIT HI MDM: CPT

## 2021-09-30 PROCEDURE — 85007 BL SMEAR W/DIFF WBC COUNT: CPT | Performed by: PHYSICIAN ASSISTANT

## 2021-09-30 PROCEDURE — 99285 EMERGENCY DEPT VISIT HI MDM: CPT | Performed by: PHYSICIAN ASSISTANT

## 2021-09-30 PROCEDURE — 71045 X-RAY EXAM CHEST 1 VIEW: CPT

## 2021-09-30 PROCEDURE — 85730 THROMBOPLASTIN TIME PARTIAL: CPT | Performed by: PHYSICIAN ASSISTANT

## 2021-09-30 PROCEDURE — 36415 COLL VENOUS BLD VENIPUNCTURE: CPT | Performed by: PHYSICIAN ASSISTANT

## 2021-09-30 PROCEDURE — 85610 PROTHROMBIN TIME: CPT | Performed by: PHYSICIAN ASSISTANT

## 2021-09-30 PROCEDURE — 96360 HYDRATION IV INFUSION INIT: CPT

## 2021-09-30 PROCEDURE — 96361 HYDRATE IV INFUSION ADD-ON: CPT

## 2021-09-30 PROCEDURE — 84484 ASSAY OF TROPONIN QUANT: CPT | Performed by: PHYSICIAN ASSISTANT

## 2021-09-30 PROCEDURE — 80053 COMPREHEN METABOLIC PANEL: CPT | Performed by: PHYSICIAN ASSISTANT

## 2021-09-30 PROCEDURE — 84443 ASSAY THYROID STIM HORMONE: CPT | Performed by: PHYSICIAN ASSISTANT

## 2021-09-30 PROCEDURE — 85027 COMPLETE CBC AUTOMATED: CPT | Performed by: PHYSICIAN ASSISTANT

## 2021-09-30 PROCEDURE — 99223 1ST HOSP IP/OBS HIGH 75: CPT | Performed by: INTERNAL MEDICINE

## 2021-09-30 PROCEDURE — 93005 ELECTROCARDIOGRAM TRACING: CPT

## 2021-09-30 RX ORDER — HEPARIN SODIUM 10000 [USP'U]/100ML
3-30 INJECTION, SOLUTION INTRAVENOUS
Status: DISCONTINUED | OUTPATIENT
Start: 2021-09-30 | End: 2021-09-30

## 2021-09-30 RX ORDER — MAGNESIUM HYDROXIDE/ALUMINUM HYDROXICE/SIMETHICONE 120; 1200; 1200 MG/30ML; MG/30ML; MG/30ML
30 SUSPENSION ORAL EVERY 6 HOURS PRN
Status: DISCONTINUED | OUTPATIENT
Start: 2021-09-30 | End: 2021-10-02

## 2021-09-30 RX ORDER — SODIUM CHLORIDE, SODIUM GLUCONATE, SODIUM ACETATE, POTASSIUM CHLORIDE, MAGNESIUM CHLORIDE, SODIUM PHOSPHATE, DIBASIC, AND POTASSIUM PHOSPHATE .53; .5; .37; .037; .03; .012; .00082 G/100ML; G/100ML; G/100ML; G/100ML; G/100ML; G/100ML; G/100ML
75 INJECTION, SOLUTION INTRAVENOUS CONTINUOUS
Status: DISCONTINUED | OUTPATIENT
Start: 2021-09-30 | End: 2021-10-01

## 2021-09-30 RX ORDER — ACETAMINOPHEN 325 MG/1
650 TABLET ORAL EVERY 6 HOURS PRN
Status: DISCONTINUED | OUTPATIENT
Start: 2021-09-30 | End: 2021-10-04 | Stop reason: HOSPADM

## 2021-09-30 RX ORDER — ONDANSETRON 2 MG/ML
4 INJECTION INTRAMUSCULAR; INTRAVENOUS EVERY 6 HOURS PRN
Status: DISCONTINUED | OUTPATIENT
Start: 2021-09-30 | End: 2021-10-01

## 2021-09-30 RX ORDER — MEGESTROL ACETATE 40 MG/1
40 TABLET ORAL 2 TIMES DAILY
Status: DISCONTINUED | OUTPATIENT
Start: 2021-09-30 | End: 2021-10-02

## 2021-09-30 RX ORDER — HEPARIN SODIUM 1000 [USP'U]/ML
3600 INJECTION, SOLUTION INTRAVENOUS; SUBCUTANEOUS
Status: DISCONTINUED | OUTPATIENT
Start: 2021-09-30 | End: 2021-09-30

## 2021-09-30 RX ORDER — DILTIAZEM HYDROCHLORIDE 5 MG/ML
0.25 INJECTION INTRAVENOUS ONCE
Status: DISCONTINUED | OUTPATIENT
Start: 2021-09-30 | End: 2021-09-30

## 2021-09-30 RX ORDER — POLYETHYLENE GLYCOL 3350 17 G/17G
17 POWDER, FOR SOLUTION ORAL DAILY
Status: DISCONTINUED | OUTPATIENT
Start: 2021-10-01 | End: 2021-10-02

## 2021-09-30 RX ORDER — HEPARIN SODIUM 1000 [USP'U]/ML
3600 INJECTION, SOLUTION INTRAVENOUS; SUBCUTANEOUS ONCE
Status: COMPLETED | OUTPATIENT
Start: 2021-09-30 | End: 2021-09-30

## 2021-09-30 RX ORDER — HEPARIN SODIUM 1000 [USP'U]/ML
1800 INJECTION, SOLUTION INTRAVENOUS; SUBCUTANEOUS
Status: DISCONTINUED | OUTPATIENT
Start: 2021-09-30 | End: 2021-09-30

## 2021-09-30 RX ADMIN — SODIUM CHLORIDE, SODIUM GLUCONATE, SODIUM ACETATE, POTASSIUM CHLORIDE, MAGNESIUM CHLORIDE, SODIUM PHOSPHATE, DIBASIC, AND POTASSIUM PHOSPHATE 75 ML/HR: .53; .5; .37; .037; .03; .012; .00082 INJECTION, SOLUTION INTRAVENOUS at 20:50

## 2021-09-30 RX ADMIN — ONDANSETRON 4 MG: 2 INJECTION INTRAMUSCULAR; INTRAVENOUS at 20:55

## 2021-09-30 RX ADMIN — ENOXAPARIN SODIUM 50 MG: 60 INJECTION SUBCUTANEOUS at 20:51

## 2021-09-30 RX ADMIN — HEPARIN SODIUM 18 UNITS/KG/HR: 10000 INJECTION, SOLUTION INTRAVENOUS at 18:33

## 2021-09-30 RX ADMIN — HEPARIN SODIUM 3600 UNITS: 1000 INJECTION INTRAVENOUS; SUBCUTANEOUS at 18:34

## 2021-09-30 RX ADMIN — SODIUM CHLORIDE 500 ML: 0.9 INJECTION, SOLUTION INTRAVENOUS at 16:32

## 2021-09-30 RX ADMIN — ACETAMINOPHEN 650 MG: 325 TABLET, FILM COATED ORAL at 20:55

## 2021-09-30 RX ADMIN — SODIUM CHLORIDE 1000 ML: 0.9 INJECTION, SOLUTION INTRAVENOUS at 16:54

## 2021-09-30 NOTE — TELEPHONE ENCOUNTER
Return call placed to patient  She is audibly SOB on the phone and asks for a prescription for oxygen  Also, she has been nauseous, with vomiting  Unable to keep anything down  I advised that she immediately report to the ED for evaluation  Suspect PE (on Eliquis) and/or obstruction

## 2021-09-30 NOTE — TELEPHONE ENCOUNTER
Pt calling due to not feeling well   She is having nausea and not able to eat, call back # [phone] sister in law

## 2021-10-01 ENCOUNTER — APPOINTMENT (INPATIENT)
Dept: CT IMAGING | Facility: HOSPITAL | Age: 71
DRG: 393 | End: 2021-10-01
Payer: MEDICARE

## 2021-10-01 LAB
ANION GAP SERPL CALCULATED.3IONS-SCNC: 10 MMOL/L (ref 4–13)
APTT PPP: 56 SECONDS (ref 23–37)
BUN SERPL-MCNC: 30 MG/DL (ref 5–25)
CALCIUM SERPL-MCNC: 8 MG/DL (ref 8.3–10.1)
CHLORIDE SERPL-SCNC: 100 MMOL/L (ref 100–108)
CO2 SERPL-SCNC: 24 MMOL/L (ref 21–32)
CREAT SERPL-MCNC: 0.55 MG/DL (ref 0.6–1.3)
ERYTHROCYTE [DISTWIDTH] IN BLOOD BY AUTOMATED COUNT: 14.9 % (ref 11.6–15.1)
GFR SERPL CREATININE-BSD FRML MDRD: 94 ML/MIN/1.73SQ M
GLUCOSE SERPL-MCNC: 87 MG/DL (ref 65–140)
HCT VFR BLD AUTO: 35.6 % (ref 34.8–46.1)
HGB BLD-MCNC: 11.7 G/DL (ref 11.5–15.4)
MCH RBC QN AUTO: 26.5 PG (ref 26.8–34.3)
MCHC RBC AUTO-ENTMCNC: 32.9 G/DL (ref 31.4–37.4)
MCV RBC AUTO: 81 FL (ref 82–98)
NRBC BLD AUTO-RTO: 0 /100 WBCS
PLATELET # BLD AUTO: 147 THOUSANDS/UL (ref 149–390)
PMV BLD AUTO: 10.2 FL (ref 8.9–12.7)
POTASSIUM SERPL-SCNC: 3.5 MMOL/L (ref 3.5–5.3)
RBC # BLD AUTO: 4.42 MILLION/UL (ref 3.81–5.12)
SODIUM SERPL-SCNC: 134 MMOL/L (ref 136–145)
WBC # BLD AUTO: 4.23 THOUSAND/UL (ref 4.31–10.16)

## 2021-10-01 PROCEDURE — 99233 SBSQ HOSP IP/OBS HIGH 50: CPT | Performed by: INTERNAL MEDICINE

## 2021-10-01 PROCEDURE — 74176 CT ABD & PELVIS W/O CONTRAST: CPT

## 2021-10-01 PROCEDURE — 97163 PT EVAL HIGH COMPLEX 45 MIN: CPT

## 2021-10-01 PROCEDURE — 80048 BASIC METABOLIC PNL TOTAL CA: CPT | Performed by: INTERNAL MEDICINE

## 2021-10-01 PROCEDURE — 85027 COMPLETE CBC AUTOMATED: CPT | Performed by: INTERNAL MEDICINE

## 2021-10-01 PROCEDURE — 36415 COLL VENOUS BLD VENIPUNCTURE: CPT | Performed by: INTERNAL MEDICINE

## 2021-10-01 PROCEDURE — 97167 OT EVAL HIGH COMPLEX 60 MIN: CPT

## 2021-10-01 RX ORDER — DEXTROSE MONOHYDRATE 100 MG/ML
50 INJECTION, SOLUTION INTRAVENOUS CONTINUOUS
Status: DISCONTINUED | OUTPATIENT
Start: 2021-10-01 | End: 2021-10-01

## 2021-10-01 RX ORDER — DEXTROSE AND SODIUM CHLORIDE 5; .9 G/100ML; G/100ML
50 INJECTION, SOLUTION INTRAVENOUS CONTINUOUS
Status: DISCONTINUED | OUTPATIENT
Start: 2021-10-01 | End: 2021-10-03

## 2021-10-01 RX ADMIN — POLYETHYLENE GLYCOL 3350 17 G: 17 POWDER, FOR SOLUTION ORAL at 10:39

## 2021-10-01 RX ADMIN — ENOXAPARIN SODIUM 50 MG: 60 INJECTION SUBCUTANEOUS at 21:54

## 2021-10-01 RX ADMIN — ENOXAPARIN SODIUM 50 MG: 60 INJECTION SUBCUTANEOUS at 10:41

## 2021-10-01 RX ADMIN — ONDANSETRON 8 MG: 2 INJECTION INTRAMUSCULAR; INTRAVENOUS at 23:53

## 2021-10-01 RX ADMIN — IOHEXOL 50 ML: 240 INJECTION, SOLUTION INTRATHECAL; INTRAVASCULAR; INTRAVENOUS; ORAL at 21:01

## 2021-10-01 RX ADMIN — ONDANSETRON 8 MG: 2 INJECTION INTRAMUSCULAR; INTRAVENOUS at 18:45

## 2021-10-01 RX ADMIN — ONDANSETRON 4 MG: 2 INJECTION INTRAMUSCULAR; INTRAVENOUS at 03:26

## 2021-10-01 RX ADMIN — DEXTROSE AND SODIUM CHLORIDE 50 ML/HR: 5; .9 INJECTION, SOLUTION INTRAVENOUS at 18:45

## 2021-10-01 RX ADMIN — ONDANSETRON 4 MG: 2 INJECTION INTRAMUSCULAR; INTRAVENOUS at 11:12

## 2021-10-02 PROBLEM — K66.8 PNEUMOPERITONEUM: Status: ACTIVE | Noted: 2021-10-02

## 2021-10-02 PROBLEM — K63.1 BOWEL PERFORATION (HCC): Status: ACTIVE | Noted: 2021-10-02

## 2021-10-02 PROBLEM — R62.7 FAILURE TO THRIVE IN ADULT: Status: ACTIVE | Noted: 2021-10-02

## 2021-10-02 LAB
ABO GROUP BLD: NORMAL
ALBUMIN SERPL BCP-MCNC: 1.2 G/DL (ref 3.5–5)
ALP SERPL-CCNC: 89 U/L (ref 46–116)
ALT SERPL W P-5'-P-CCNC: 21 U/L (ref 12–78)
ANION GAP SERPL CALCULATED.3IONS-SCNC: 9 MMOL/L (ref 4–13)
AST SERPL W P-5'-P-CCNC: 20 U/L (ref 5–45)
BILIRUB SERPL-MCNC: 0.35 MG/DL (ref 0.2–1)
BLD GP AB SCN SERPL QL: NEGATIVE
BUN SERPL-MCNC: 27 MG/DL (ref 5–25)
CALCIUM ALBUM COR SERPL-MCNC: 10.8 MG/DL (ref 8.3–10.1)
CALCIUM SERPL-MCNC: 8.6 MG/DL (ref 8.3–10.1)
CHLORIDE SERPL-SCNC: 105 MMOL/L (ref 100–108)
CO2 SERPL-SCNC: 26 MMOL/L (ref 21–32)
CREAT SERPL-MCNC: 0.55 MG/DL (ref 0.6–1.3)
GFR SERPL CREATININE-BSD FRML MDRD: 94 ML/MIN/1.73SQ M
GLUCOSE SERPL-MCNC: 98 MG/DL (ref 65–140)
INR PPP: 1.32 (ref 0.84–1.19)
LACTATE SERPL-SCNC: 1.1 MMOL/L (ref 0.5–2)
MAGNESIUM SERPL-MCNC: 2.2 MG/DL (ref 1.6–2.6)
PHOSPHATE SERPL-MCNC: 2.5 MG/DL (ref 2.3–4.1)
POTASSIUM SERPL-SCNC: 4.1 MMOL/L (ref 3.5–5.3)
PROT SERPL-MCNC: 5.9 G/DL (ref 6.4–8.2)
PROTHROMBIN TIME: 15.8 SECONDS (ref 11.6–14.5)
RH BLD: POSITIVE
SODIUM SERPL-SCNC: 140 MMOL/L (ref 136–145)
SPECIMEN EXPIRATION DATE: NORMAL
TRIGL SERPL-MCNC: 89 MG/DL

## 2021-10-02 PROCEDURE — 99223 1ST HOSP IP/OBS HIGH 75: CPT | Performed by: SURGERY

## 2021-10-02 PROCEDURE — 86900 BLOOD TYPING SEROLOGIC ABO: CPT | Performed by: PHYSICIAN ASSISTANT

## 2021-10-02 PROCEDURE — 85610 PROTHROMBIN TIME: CPT | Performed by: PHYSICIAN ASSISTANT

## 2021-10-02 PROCEDURE — 99223 1ST HOSP IP/OBS HIGH 75: CPT | Performed by: OBSTETRICS & GYNECOLOGY

## 2021-10-02 PROCEDURE — 84478 ASSAY OF TRIGLYCERIDES: CPT | Performed by: INTERNAL MEDICINE

## 2021-10-02 PROCEDURE — 83605 ASSAY OF LACTIC ACID: CPT | Performed by: PHYSICIAN ASSISTANT

## 2021-10-02 PROCEDURE — 86901 BLOOD TYPING SEROLOGIC RH(D): CPT | Performed by: PHYSICIAN ASSISTANT

## 2021-10-02 PROCEDURE — 99233 SBSQ HOSP IP/OBS HIGH 50: CPT | Performed by: INTERNAL MEDICINE

## 2021-10-02 PROCEDURE — 80053 COMPREHEN METABOLIC PANEL: CPT | Performed by: INTERNAL MEDICINE

## 2021-10-02 PROCEDURE — 86850 RBC ANTIBODY SCREEN: CPT | Performed by: PHYSICIAN ASSISTANT

## 2021-10-02 PROCEDURE — 83735 ASSAY OF MAGNESIUM: CPT | Performed by: INTERNAL MEDICINE

## 2021-10-02 PROCEDURE — 84100 ASSAY OF PHOSPHORUS: CPT | Performed by: INTERNAL MEDICINE

## 2021-10-02 RX ORDER — OXYCODONE HYDROCHLORIDE 5 MG/1
5 TABLET ORAL EVERY 4 HOURS PRN
Status: DISCONTINUED | OUTPATIENT
Start: 2021-10-02 | End: 2021-10-04 | Stop reason: HOSPADM

## 2021-10-02 RX ADMIN — DEXTROSE AND SODIUM CHLORIDE 50 ML/HR: 5; .9 INJECTION, SOLUTION INTRAVENOUS at 18:51

## 2021-10-02 RX ADMIN — ENOXAPARIN SODIUM 50 MG: 60 INJECTION SUBCUTANEOUS at 10:36

## 2021-10-02 RX ADMIN — ONDANSETRON 8 MG: 2 INJECTION INTRAMUSCULAR; INTRAVENOUS at 11:45

## 2021-10-02 RX ADMIN — ONDANSETRON 8 MG: 2 INJECTION INTRAMUSCULAR; INTRAVENOUS at 23:40

## 2021-10-02 RX ADMIN — ONDANSETRON 8 MG: 2 INJECTION INTRAMUSCULAR; INTRAVENOUS at 18:51

## 2021-10-02 RX ADMIN — ONDANSETRON 8 MG: 2 INJECTION INTRAMUSCULAR; INTRAVENOUS at 04:20

## 2021-10-02 RX ADMIN — ENOXAPARIN SODIUM 50 MG: 60 INJECTION SUBCUTANEOUS at 21:42

## 2021-10-03 VITALS
HEIGHT: 64 IN | SYSTOLIC BLOOD PRESSURE: 139 MMHG | DIASTOLIC BLOOD PRESSURE: 82 MMHG | TEMPERATURE: 98.8 F | OXYGEN SATURATION: 97 % | WEIGHT: 102.29 LBS | HEART RATE: 97 BPM | BODY MASS INDEX: 17.46 KG/M2 | RESPIRATION RATE: 16 BRPM

## 2021-10-03 PROCEDURE — 99232 SBSQ HOSP IP/OBS MODERATE 35: CPT | Performed by: INTERNAL MEDICINE

## 2021-10-03 RX ORDER — BISACODYL 10 MG
10 SUPPOSITORY, RECTAL RECTAL DAILY PRN
Status: DISCONTINUED | OUTPATIENT
Start: 2021-10-03 | End: 2021-10-04 | Stop reason: HOSPADM

## 2021-10-03 RX ORDER — LORAZEPAM 2 MG/ML
0.25 INJECTION INTRAMUSCULAR EVERY 4 HOURS PRN
Status: DISCONTINUED | OUTPATIENT
Start: 2021-10-03 | End: 2021-10-04 | Stop reason: HOSPADM

## 2021-10-03 RX ADMIN — ONDANSETRON 8 MG: 2 INJECTION INTRAMUSCULAR; INTRAVENOUS at 23:08

## 2021-10-03 RX ADMIN — Medication 10 ML: at 09:44

## 2021-10-03 RX ADMIN — Medication 10 ML: at 15:32

## 2021-10-03 RX ADMIN — ONDANSETRON 8 MG: 2 INJECTION INTRAMUSCULAR; INTRAVENOUS at 05:16

## 2021-10-03 RX ADMIN — LORAZEPAM 0.25 MG: 2 INJECTION INTRAMUSCULAR; INTRAVENOUS at 11:37

## 2021-10-03 RX ADMIN — ONDANSETRON 8 MG: 2 INJECTION INTRAMUSCULAR; INTRAVENOUS at 11:05

## 2021-10-03 RX ADMIN — BISACODYL 10 MG: 10 SUPPOSITORY RECTAL at 09:44

## 2021-10-03 RX ADMIN — ONDANSETRON 8 MG: 2 INJECTION INTRAMUSCULAR; INTRAVENOUS at 17:52

## 2021-10-04 PROCEDURE — 99239 HOSP IP/OBS DSCHRG MGMT >30: CPT | Performed by: INTERNAL MEDICINE

## 2021-10-04 RX ORDER — MORPHINE SULFATE 100 MG/5ML
5 SOLUTION ORAL EVERY 2 HOUR PRN
Qty: 15 ML | Refills: 0 | Status: SHIPPED | OUTPATIENT
Start: 2021-10-04 | End: 2021-10-14

## 2021-10-04 RX ORDER — ONDANSETRON 8 MG/1
8 TABLET, ORALLY DISINTEGRATING ORAL EVERY 6 HOURS PRN
Qty: 40 TABLET | Refills: 0 | Status: SHIPPED | OUTPATIENT
Start: 2021-10-04

## 2021-10-04 RX ORDER — LORAZEPAM 2 MG/ML
0.5 CONCENTRATE ORAL EVERY 8 HOURS PRN
Qty: 5 ML | Refills: 0 | Status: SHIPPED | OUTPATIENT
Start: 2021-10-04 | End: 2021-10-14

## 2021-10-04 RX ADMIN — ONDANSETRON 8 MG: 2 INJECTION INTRAMUSCULAR; INTRAVENOUS at 11:36

## 2021-10-04 RX ADMIN — ONDANSETRON 8 MG: 2 INJECTION INTRAMUSCULAR; INTRAVENOUS at 05:01

## 2021-10-05 LAB
ATRIAL RATE: 122 BPM
P AXIS: 65 DEGREES
PR INTERVAL: 128 MS
QRS AXIS: 98 DEGREES
QRSD INTERVAL: 74 MS
QT INTERVAL: 314 MS
QTC INTERVAL: 447 MS
T WAVE AXIS: 52 DEGREES
VENTRICULAR RATE: 122 BPM

## 2021-10-05 PROCEDURE — 93010 ELECTROCARDIOGRAM REPORT: CPT | Performed by: INTERNAL MEDICINE

## 2021-10-06 LAB
ATRIAL RATE: 178 BPM
QRS AXIS: 103 DEGREES
QRSD INTERVAL: 78 MS
QT INTERVAL: 254 MS
QTC INTERVAL: 419 MS
T WAVE AXIS: 19 DEGREES
VENTRICULAR RATE: 164 BPM

## 2021-10-06 PROCEDURE — 93010 ELECTROCARDIOGRAM REPORT: CPT | Performed by: INTERNAL MEDICINE

## 2021-10-07 ENCOUNTER — PATIENT OUTREACH (OUTPATIENT)
Dept: CASE MANAGEMENT | Facility: HOSPITAL | Age: 71
End: 2021-10-07

## 2022-10-04 NOTE — DISCHARGE INSTRUCTIONS
Take antibiotics as prescribed  Follow-up with your family doctor in 2-3 days for recheck  Return to ER with any fevers, chills, or spreading redness 
constant/pressure/sharp/spasm

## 2023-03-08 NOTE — ED PROVIDER NOTES
[place] AMBULATORY ENCOUNTER  HEMATOLOGY/ONCOLOGY CONSULT NOTE      REASON FOR CONSULTATION:  I am seeing Dangelo Merchant at the request of Dean Mcguire MD for my opinion regarding evaluation and management of lymphadenopathy    SOURCE OF INFORMATION:  Patient and [facility] electronic medical record    CHIEF COMPLAINT:  Consultation      HISTORY OF PRESENT ILLNESS:  Dangelo Merchant is a 45 year old male seen today for progressive retroperitoneal and pelvic lymphadenopathy noted on CT scan of the abdomen and pelvis done on 11/29/2022.    He underwent a CT scan of the abdomen and pelvis for kidney stones due to recurrent UTI.  This revealed small volume retroperitoneal lymphadenopathy including a 1.2 cm left para-aortic lymph node, 8 mm left para-aortic lymph node and 9 mm portacaval lymph node.  The 1.2 cm lymph node was noted to have increased in size from 1 cm in January of 2021.  Additionally, a 13 mm left common iliac chain lymph node was noted which was 9 mm back in January 2021.  10 mm external iliac lymph node had increased in size from 8 mm.  A left inguinal lymph node measured 19 mm, up from 12 mm in January 2021.    MEDICATIONS AND ALLERGIES:    Current Outpatient Medications   Medication Sig Dispense Refill   • sodium bicarbonate 650 MG tablet TAKE 1 TABLET BY MOUTH TWICE DAILY 60 tablet 3   • aspirin 81 MG chewable tablet CHEW AND SWALLOW 1 TABLET BY MOUTH DAILY 90 tablet 1   • amLODIPine (NORVASC) 5 MG tablet TAKE 1 TABLET BY MOUTH DAILY 90 tablet 1   • carvedilol (COREG) 25 MG tablet Take 1 tablet by mouth in the morning and 1 tablet in the evening. Take with meals. 180 tablet 0   • clopidogrel (PLAVIX) 75 MG tablet TAKE 1 TABLET BY MOUTH DAILY 90 tablet 1   • insulin glargine (Lantus SoloStar) 100 UNIT/ML pen-injector Inject 24 Units into the skin nightly. Prime 2 units before each dose. 45 mL 1   • torsemide (DEMADEX) 20 MG tablet TAKE 1 TABLET BY MOUTH DAILY 90 tablet 3   • atorvastatin (LIPITOR)  History  Chief Complaint   Patient presents with    Ascites     pt states stomach started getting "puffy & uncomfortable" last week; CT on thursday showed ascites in abdomen  Says it is a result of chemo about 2 weeks ago  Feels SOB now and has trouble eating; hard to bend over  Dr Rico Hodsgon referred her to here for IR     65-year-old female with known uterine cancer and lymph node involvement presenting with new ascites  Patient was seen in the emergency department of [facility] yesterday and noted to have extensive, worsening new onset ascites  At the time patient was offered paracentesis in the emergency department, however patient refused opting instead to be seen by interventional radiology for initial paracentesis  Patient presents today for evaluation by Interventional Radiology for possible paracentesis procedure  Patient is complaining of abdominal distension, minimal abdominal pain, worsening shortness of breath, and reflux  Patient is denying chest pain, diarrhea, constipation, fever, chills, night sweats, cough  Notably patient was recently diagnosed with bilateral DVTs and started on Eliquis, however she has only received 1 dose of Eliquis to date        History provided by:  Patient   used: No    Abdominal Pain  Pain location:  Generalized  Pain quality: bloating and fullness    Pain radiates to:  Does not radiate  Pain severity:  Moderate  Onset quality:  Gradual  Duration:  7 days  Timing:  Constant  Progression:  Worsening  Chronicity:  New  Context: recent illness    Context: not suspicious food intake    Relieved by:  Nothing  Worsened by:  Palpation  Ineffective treatments:  Position changes and lying down  Associated symptoms: shortness of breath    Associated symptoms: no chest pain, no chills, no constipation, no cough, no dysuria, no fever, no hematemesis, no hematochezia, no hematuria, no sore throat and no vomiting    Risk factors: has not had multiple 20 MG tablet Take 1 tablet by mouth daily. 90 tablet 3   • blood glucose (Accu-Chek Mami Plus) test strip Test blood sugar 3 times daily. Diagnosis: E11.8. Meter: Acu-check mami plus 300 strip 11   • Insulin Pen Needle (BD PEN NEEDLE WADE U/F) 32G X 4 MM Misc Scheduled 10 u with meals, Correction Dose: 1 u for -199, 2 u for -249, 3 u for -299, 4 u for -349, 5 u for BG > 349 150 each 2   • sharps container Use as directed for disposal of insulin pen needles 1 each 5   • Lancets 30G Misc Tests 3 times daily 300 each 4   • Blood Glucose Monitoring Suppl w/Device Kit 1 glucometer 1 kit 0   • DISPENSE Glucose test strips for New Diabetic Pt testing 3 times per day 90 strip 0     No current facility-administered medications for this visit.     ALLERGIES:  No Known Allergies    PROBLEM LIST:  Patient Active Problem List   Diagnosis   • Color vision deficiency   • Diabetic ulcer of left midfoot associated with type 2 diabetes mellitus, with necrosis of muscle (CMD)   • Nonhealing surgical wound, subsequent encounter   • Essential (primary) hypertension   • Type 2 diabetes mellitus with microalbuminuria, with long-term current use of insulin (CMD)   • Diabetic ulcer of left midfoot associated with type 2 diabetes mellitus, with fat layer exposed (CMD)        HISTORIES:  Past Medical History:   Diagnosis Date   • CAD (coronary artery disease)    • Diabetes mellitus (CMD)    • Diabetic ulcer of left midfoot associated with type 2 diabetes mellitus, with necrosis of muscle (CMD) 5/16/2018   • Essential (primary) hypertension    • Fracture    • High cholesterol    • Osteomyelitis of foot (CMD) 2020     Past Surgical History:   Procedure Laterality Date   • Coronary angioplasty with stent placement  03/2019    2 stents placed   • Cyst removal  2014    back   • Foot amputation Left 04/23/2020   • Fracture surgery Left     8th grade   • Toe amputation Left     5&4     Family History   Problem Relation Age  surgeries and not pregnant        Prior to Admission Medications   Prescriptions Last Dose Informant Patient Reported? Taking? Ascorbic Acid (vitamin C) 1000 MG tablet Past Week at Unknown time Self Yes Yes   Sig: Take 1,000 mg by mouth daily   Calcium Carb-Cholecalciferol (CALCIUM 1000 + D PO) 9/4/2021 at Unknown time Self Yes Yes   Sig: Take 2,000 Units by mouth     Cholecalciferol (Vitamin D3) 25 MCG (1000 UT) CAPS 9/4/2021 at Unknown time Self Yes Yes   Sig: Take 1 capsule by mouth 2 (two) times a day   Cranberry 1000 MG CAPS Past Week at Unknown time Self Yes Yes   Sig: Take by mouth   LORazepam (ATIVAN) 1 mg tablet Not Taking at Unknown time Self No No   Sig: Take 1 tablet (1 mg total) by mouth every 6 (six) hours as needed for anxiety (or nausea)   Patient not taking: Reported on 9/4/2021   LUTEIN PO 9/3/2021 at Unknown time Self Yes Yes   Sig: Take 1 tablet by mouth daily   Probiotic Product (CULTURELLE PROBIOTICS PO) 9/4/2021 at Unknown time Self Yes Yes   Sig: Take by mouth   VITAMIN K PO Not Taking at Unknown time Self Yes No   Sig: Take 50 mcg by mouth daily    Patient not taking: Reported on 9/4/2021   Vitamins A & D (VITAMIN A & D PO) 9/3/2021 at Unknown time Self Yes Yes   Sig: Take by mouth   acetaminophen (TYLENOL) 500 mg tablet Past Week at Unknown time Self Yes Yes   Sig: Take 500 mg by mouth every 6 (six) hours as needed for mild pain   apixaban (ELIQUIS) 5 mg Past Week at Unknown time  No Yes   Sig: Take 2 tablets (10 mg total) by mouth 2 (two) times a day for 7 days, THEN 1 tablet (5 mg total) 2 (two) times a day for 23 days     azelastine (OPTIVAR) 0 05 % ophthalmic solution Past Week at Unknown time Self Yes Yes   Sig: Administer 1 drop to both eyes daily as needed   diphenhydrAMINE HCl (BENADRYL ALLERGY PO) Past Month at Unknown time Self Yes Yes   Sig: Take by mouth   methylPREDNISolone 4 MG tablet therapy pack Not Taking at Unknown time Self No No   Sig: Use as directed on package Patient not taking: Reported on 2021   metroNIDAZOLE (METROCREAM) 0 75 % cream 9/3/2021 at Unknown time Self Yes Yes   Sig: Apply topically 2 (two) times a day   naloxone (NARCAN) 4 mg/0 1 mL nasal spray Not Taking at Unknown time Self No No   Sig: Administer 1 spray into a nostril  If no response after 2-3 minutes, give another dose in the other nostril using a new spray     Patient not taking: Reported on 2021   nitrofurantoin (MACROBID) 100 mg capsule Not Taking at Unknown time Self No No   Sig: Take 1 capsule (100 mg total) by mouth 2 (two) times a day   Patient not taking: Reported on 2021   ondansetron (ZOFRAN) 8 mg tablet 9/3/2021 at Unknown time Self No Yes   Sig: Take 1 tablet (8 mg total) by mouth every 8 (eight) hours as needed for nausea or vomiting   oxyCODONE (ROXICODONE) 5 mg immediate release tablet Not Taking at Unknown time Self No No   Si -2 tabs by mouth every 4-6 hour as needed   Patient not taking: Reported on 2021   vitamin E, tocopherol, 1,000 units capsule 2021 at Unknown time Self Yes Yes   Sig: Take 1,000 Units by mouth daily      Facility-Administered Medications: None       Past Medical History:   Diagnosis Date    Breast cancer ([facility] [address] )     History of transfusion 2018    PONV (postoperative nausea and vomiting)        Past Surgical History:   Procedure Laterality Date    BREAST LUMPECTOMY Right 2007    COLONOSCOPY      DILATION AND CURETTAGE OF UTERUS      FEMUR SURGERY Right 2018    FL GUIDED CENTRAL VENOUS ACCESS DEVICE INSERTION  2021    HYSTERECTOMY      IR PARACENTESIS  2021    [place] INSJ TUNNELED CTR VAD W/SUBQ PORT AGE 5 YR/> N/A 2021    Procedure: INSERTION VENOUS PORT ( PORT-A-CATH) IR;  Surgeon: Goran Herbert DO;  [facility];  Service: Interventional Radiology    [place] LAPAROSCOPY W TOT HYSTERECTUTERUS <=250 GRAM  W TUBE/OVARY N/A 6/3/2021    Procedure: ROBOT ASSISTED TOTAL LAPAROSCOPIC HYSTERECTOMY, BILATERAL of Onset   • Patient is unaware of any medical problems Mother    • Diabetes Father    • Hypertension Father    • High cholesterol Father    • Diabetes Maternal Aunt    • Diabetes Maternal Grandmother      Social History     Tobacco Use   • Smoking status: Never   • Smokeless tobacco: Never   Vaping Use   • Vaping Use: never used   Substance Use Topics   • Alcohol use: No   • Drug use: No       REVIEW OF SYSTEMS:    Nikki Rosario, [facility]  3/8/2023  3:19 PM  Sign when Signing Visit  Dangelo Merchant is a 45 year old male here for  Chief Complaint   Patient presents with   • Consultation     Denies latex allergy or sensitivity.    Medication verified, no changes.  PCP and Pharmacy verified.    Social History     Tobacco Use   Smoking Status Never   Smokeless Tobacco Never     Advance Directives Filed: No    ECOG:   ECOG   ECOG Performance Status        Vitals:    There were no vitals taken for this visit.    These vital signs are:  Within defined parameters (Per Reference \"Defined Limits Hospital Outpatient Department (HOD)\")    Height: No.  Ht Readings from Last 1 Encounters:   02/28/23 5' 5\" (1.651 m)     Weight:Yes, shoes on.  Wt Readings from Last 3 Encounters:   02/28/23 127.6 kg (281 lb 4.8 oz)   01/31/23 127 kg (280 lb)   12/06/22 129.7 kg (286 lb)       BMI: There is no height or weight on file to calculate BMI.    REVIEW OF SYSTEMS  GENERAL:  Patient denies headache, fevers, chills, night sweats, excessive fatigue, change in appetite, weight loss, dizziness, but complains of: chills  ALLERGIC/IMMUNOLOGIC: Verified allergies: Yes  EYES:  Patient denies significant visual difficulties, double vision, blurred vision  ENT/MOUTH: Patient denies problems with hearing, sore throat, sinus drainage, mouth sores  ENDOCRINE:  Patient denies diabetes, thyroid disease, hormone replacement, hot flashes, but complains of: diabetes  HEMATOLOGIC/LYMPHATIC: Patient denies easy bruising, bleeding, tender lymph nodes, swollen  SALPINGO-OOPHORECTOMY, SENTINEL LYMPH NODE MAPPING AND BIOPSY;  Surgeon: Carmelo Starr MD;  [facility];  Service: Gynecology Oncology    TUBAL LIGATION         Family History   Problem Relation Age of Onset    Ovarian cancer Neg Hx     Cervical cancer Neg Hx     Uterine cancer Neg Hx      I have reviewed and agree with the history as documented  E-Cigarette/Vaping    E-Cigarette Use Never User      E-Cigarette/Vaping Substances    Nicotine No     THC No     CBD No     Flavoring No     Other No     Unknown No      Social History     Tobacco Use    Smoking status: Never Smoker    Smokeless tobacco: Never Used   Vaping Use    Vaping Use: Never used   Substance Use Topics    Alcohol use: Yes     Alcohol/week: 1 0 standard drinks     Types: 1 Glasses of wine per week    Drug use: No        Review of Systems   Constitutional: Negative for chills and fever  HENT: Negative for ear pain and sore throat  Eyes: Negative for pain and visual disturbance  Respiratory: Positive for shortness of breath  Negative for cough  Cardiovascular: Negative for chest pain and palpitations  Gastrointestinal: Positive for abdominal distention and abdominal pain  Negative for blood in stool, constipation, hematemesis, hematochezia and vomiting  Endocrine: Negative for polyuria  Genitourinary: Negative for dysuria and hematuria  Musculoskeletal: Negative for arthralgias, back pain and neck stiffness  Skin: Negative for color change and rash  Neurological: Negative for dizziness, syncope, light-headedness and numbness  Hematological: Negative for adenopathy  Does not bruise/bleed easily  Psychiatric/Behavioral: Negative for agitation, confusion and decreased concentration  The patient is not nervous/anxious  All other systems reviewed and are negative        Physical Exam  ED Triage Vitals   Temperature Pulse Respirations Blood Pressure SpO2   09/04/21 0904 09/04/21 0802 09/04/21 0802 lymph nodes  BREASTS: Patient denies abnormal masses of breast, nipple discharge, pain  RESPIRATORY:  Patient denies lung pain with breathing, cough, coughing up blood, shortness of breath  CARDIOVASCULAR:  Patient denies anginal chest pain, palpitations, shortness of breath when lying flat, peripheral edema, but complains of: peripheral edema legs  GASTROINTESTINAL: Patient denies abdominal pain , nausea, vomiting, diarrhea, GI bleeding, constipation, change in bowel habits, heartburn, sensation of feeling full, difficulty swallowing  : Patient denies blood in the urine, burning with urination, frequency, urgency, hesitancy, incontinence  MUSCULOSKELETAL:  Patient denies joint pain, bone pain, joint swelling, redness, decreased range of motion  SKIN:  Patient denies chronic rashes, inflammation, ulcerations, skin changes, itching  NEUROLOGIC:  Patient denies loss of balance, areas of focal weakness, abnormal gait, sensory problems, numbness, tingling, but complains of: numbness feet  PSYCHIATRIC: Patient denies insomnia, depression, anxiety    This patient reported abnormal symptoms that needed immediate verbal communication: No        PHYSICAL EXAM:  Vital Signs:   Oncology Encounter Vitals [03/08/23 1515]   ONC OP Encounter Vitals Group      BP (!) 185/99      Heart Rate 71      Resp 16      Temp 97.2 °F (36.2 °C)      Temp src Temporal      SpO2 98 %      Weight 283 lb 11.2 oz (128.7 kg)      Height       Pain Score  0      Pain Location       Pain Education?       BSA (Calculated - m2) - [employer]       BSA (Calculated - sq m)       BMI (Calculated)      ECOG Performance Status:   ECOG [03/08/23 1517]   ECOG Performance Status 0       General: The patient is alert, well-developed, well-nourished, no distress.  Skin: Warm, normal color, normal texture, normal turgor and without rash.  Head: Normocephalic, atraumatic.  Neck: Supple with no significant adenopathy.  ENT: Not examined due to universal  09/04/21 0802 09/04/21 0802   98 8 °F (37 1 °C) 102 18 132/63 97 %      Temp Source Heart Rate Source Patient Position - Orthostatic VS BP Location FiO2 (%)   09/04/21 0904 09/04/21 0802 09/04/21 0802 09/04/21 0802 --   Oral Monitor Sitting Left arm       Pain Score       09/04/21 0802       7             Orthostatic Vital Signs  Vitals:    09/04/21 1019 09/04/21 1047 09/04/21 1538 09/04/21 2213   BP: 137/74 142/69 150/67 148/68   Pulse: 96 93 98 96   Patient Position - Orthostatic VS:   Lying        Physical Exam  Vitals and nursing note reviewed  Constitutional:       General: She is not in acute distress  Appearance: She is well-developed  HENT:      Head: Normocephalic and atraumatic  Right Ear: External ear normal       Left Ear: External ear normal       Nose: Nose normal  No congestion or rhinorrhea  Mouth/Throat:      Mouth: Mucous membranes are moist       Pharynx: Oropharynx is clear  No oropharyngeal exudate or posterior oropharyngeal erythema  Eyes:      Extraocular Movements: Extraocular movements intact  Conjunctiva/sclera: Conjunctivae normal       Pupils: Pupils are equal, round, and reactive to light  Cardiovascular:      Rate and Rhythm: Normal rate and regular rhythm  Pulses: Normal pulses  Heart sounds: Normal heart sounds  No murmur heard  Pulmonary:      Effort: Pulmonary effort is normal  No respiratory distress  Breath sounds: Normal breath sounds  No wheezing or rhonchi  Abdominal:      General: There is distension  Tenderness: There is abdominal tenderness (diffuse)  There is no guarding  Hernia: No hernia is present  Comments: Fluid wave present   Musculoskeletal:         General: No swelling, tenderness or deformity  Cervical back: Neck supple  No rigidity  Lymphadenopathy:      Cervical: No cervical adenopathy  Skin:     General: Skin is warm and dry  Capillary Refill: Capillary refill takes less than 2 seconds  masking requirement for COVID 19 pandemic   Abdomen: Soft and non tender.  No hepato-splenomegaly or masses.  Extremities: No edema. Normal muscle tone and development bilaterally.  Lymph: No cervical, supraclavicular, axillary, inguinal lymphadenopathy.  Neurologic: Motor strength normal. Coordination normal. No tremor noted.  Psychiatric: Cooperative. Appropriate mood and affect. Normal judgment.     LABORATORY DATA:  Recent Labs   Lab 02/06/23  1612   WBC 8.7   RBC 3.33*   HGB 10.3*   HCT 32.2*   MCV 96.7      Absolute Neutrophils 6.3   Absolute Lymphocytes 1.6   Absolute Monocytes 0.5   Absolute Eosinophils  0.3   Absolute Basophils 0.0     Recent Labs   Lab 03/08/23  1601 02/06/23  1612   Glucose  --  85   Sodium  --  141   Potassium  --  4.8   Chloride  --  108   Carbon Dioxide  --  24   BUN  --  51*   Creatinine  --  2.04*   Calcium  --  8.1*   Protein, Total 8.0 8.2   Albumin  --  3.2*   GOT/AST  --  15   Alkaline Phosphatase  --  138*   GPT  --  15     Recent Labs   Lab 03/08/23  1601 02/06/23  1612   Anion Gap  --  14   Globulin  --  5.0*   LD, Total 259*  --    Bilirubin, Total  --  0.5       IMAGING STUDIES:  CT Pelvis    Result Date: 1/28/2023  EXAM:  CT PELVIS WO CONTRAST HISTORY:  Lymphadenopathy, inguinal/pelvic, enlarged lymph node COMPARISON:  CT abdomen pelvis 11/29/2022 TECHNIQUE:  Scans were obtained from the iliac crest to the symphysis pubis without contrast. FINDINGS:  Similar 13 mm left para-aortic lymph node (2:13 and compared to 2:75). Additional similar 9 mm retroperitoneal lymph node is also seen (2:82:48). Approximately 11 mm right common iliac lymph node (2:62), similar to prior (2:144). Multiple left inguinal lymph nodes are seen, individual measuring approximately 12 mm in greatest diameter(2:76). 19 mm similar left inguinal lymph node (2:94 and compared to 2:176). Additional small retroperitoneal lymph nodes are also seen. Tiny fatty filled periumbilical hernia. Vascular  Findings: No bruising, erythema or lesion  Neurological:      General: No focal deficit present  Mental Status: She is alert and oriented to person, place, and time  Mental status is at baseline  Psychiatric:         Mood and Affect: Mood normal          Behavior: Behavior normal          ED Medications  Medications   lidocaine 1% buffered (10 mL Infiltration Given 9/4/21 1030)       Diagnostic Studies  Results Reviewed     Procedure Component Value Units Date/Time    Body fluid culture and Gram stain [[id number]] Collected: 09/04/21 1047    Lab Status: Preliminary result Specimen:  Body Fluid from Paracentesis Updated: 09/05/21 1151     Body Fluid Culture, Sterile No growth     Gram Stain Result Rare Polys      No bacteria seen    Basic metabolic panel [[id number]]  (Abnormal) Collected: 09/05/21 0534    Lab Status: Final result Specimen: Blood from Arm, Right Updated: 09/05/21 0641     Sodium 133 mmol/L      Potassium 3 8 mmol/L      Chloride 100 mmol/L      CO2 28 mmol/L      ANION GAP 5 mmol/L      BUN 12 mg/dL      Creatinine 0 80 mg/dL      Glucose 94 mg/dL      Calcium 8 2 mg/dL      eGFR 74 ml/min/1 73sq m     Narrative:      [place] guidelines for Chronic Kidney Disease (CKD):     Stage 1 with normal or high GFR (GFR > 90 mL/min/1 73 square meters)    Stage 2 Mild CKD (GFR = 60-89 mL/min/1 73 square meters)    Stage 3A Moderate CKD (GFR = 45-59 mL/min/1 73 square meters)    Stage 3B Moderate CKD (GFR = 30-44 mL/min/1 73 square meters)    Stage 4 Severe CKD (GFR = 15-29 mL/min/1 73 square meters)    Stage 5 End Stage CKD (GFR <15 mL/min/1 73 square meters)  Note: GFR calculation is accurate only with a steady state creatinine    Magnesium [[id number]]  (Normal) Collected: 09/05/21 0534    Lab Status: Final result Specimen: Blood from Arm, Right Updated: 09/05/21 0641     Magnesium 2 1 mg/dL     CBC and differential [[id number]]  (Abnormal) Collected: 09/05/21 0534 calcifications are seen. No focally dilated bowel loops. Pelvic phleboliths. Degenerative changes at the lumbosacral junction. Degenerative changes of the hips.     IMPRESSION:  Similar mildly prominent retroperitoneal and pelvic lymph nodes when compared to the earlier study. Findings could represent lymphoproliferative disease and/or be reactive in etiology. Consider PET CT and/or short-term follow-up CT studies for continued surveillance.      ASSESSMENT/PLAN:  ASSESSMENT: Lymphadenopathy  (primary encounter diagnosis)  Comment:  Therefore, this is a 45-year-old male who is being evaluated for abdominal pelvic lymphadenopathy which is mild in nature.  Differential diagnosis includes reactive adenopathy versus a lymphoproliferative disorder.  I have recommended laboratory studies including plasma cell profile, LDH and sedimentation rate.  I have also recommended a PET scan to determine the metabolic nature of these lymph nodes.  Certainly, hypermetabolic lymph nodes would be more concerning for a neoplastic process and a biopsy would definitely be indicated.  He is agreeable to this approach.  Laboratory studies will be obtained today.  PET scan will be scheduled.  He will return for follow-up after the PET scan  Plan: PET CT FDG SKULL BASE TO MID-THIGHS, LACTATE         DEHYDROGENASE, PLASMA CELL PROFILE,         SEDIMENTATION RATE        Return to clinic after the PET scan      The patient indicated understanding of the diagnosis and agreed with the plan of care.        A copy of this note was sent to the requesting provider.      ADDENDUM:  PET scan is needed for biopsy guidance.   Lab Status: Final result Specimen: Blood from Arm, Right Updated: 09/05/21 0626     WBC 7 80 Thousand/uL      RBC 4 57 Million/uL      Hemoglobin 12 3 g/dL      Hematocrit 37 8 %      MCV 83 fL      MCH 26 9 pg      MCHC 32 5 g/dL      RDW 13 6 %      MPV 9 4 fL      Platelets 381 Thousands/uL      nRBC 0 /100 WBCs      Neutrophils Relative 64 %      Immat GRANS % 0 %      Lymphocytes Relative 18 %      Monocytes Relative 16 %      Eosinophils Relative 1 %      Basophils Relative 1 %      Neutrophils Absolute 5 00 Thousands/µL      Immature Grans Absolute 0 03 Thousand/uL      Lymphocytes Absolute 1 40 Thousands/µL      Monocytes Absolute 1 26 Thousand/µL      Eosinophils Absolute 0 06 Thousand/µL      Basophils Absolute 0 05 Thousands/µL     Body Fluid Diff [[id number]] Collected: 09/04/21 1047    Lab Status: Final result Specimen: Body Fluid from Paracentesis Updated: 09/04/21 1349     Total Counted 29     Neutrophils % (Fluid) 3 %      Lymphs % (Fluid) 41 %      Basophils % (Fluid) 3 %      Histiocyte % (Fluid) 17 %      Monocytes % (Fluid) 34 %     Body fluid white cell count with differential [[id number]] Collected: 09/04/21 1047    Lab Status: Final result Specimen: Body Fluid from Paracentesis Updated: 09/04/21 1156     Site --     WBC, Fluid 816 /ul     Total Protein, body fluid [[id number]] Collected: 09/04/21 1047    Lab Status: Final result Specimen: Body Fluid from Paracentesis Updated: 09/04/21 1129     Protein, Fluid 4 4 g/dL     Albumin, fluid [[id number]] Collected: 09/04/21 1047    Lab Status: Final result Specimen: Body Fluid from Paracentesis Updated: 09/04/21 1129     Albumin, Fluid 1 8 g/dL     LD (LDH), Body Fluid [[id number]] Collected: 09/04/21 1047    Lab Status: Final result Specimen: Body Fluid from Paracentesis Updated: 09/04/21 1129     LD, Fluid 1,933 U/L     Glucose, body fluid [[id number]] Collected: 09/04/21 1047    Lab Status: Final result Specimen:  Body Fluid from Peritoneal Fluid Updated: 09/04/21 1112     Glucose, Fluid 24 mg/dL     Troponin I [[id number]]  (Normal) Collected: 09/04/21 0915    Lab Status: Final result Specimen: Blood from Arm, Left Updated: 09/04/21 0945     Troponin I <0 02 ng/mL     Comprehensive metabolic panel [[id number]]  (Abnormal) Collected: 09/04/21 0915    Lab Status: Final result Specimen: Blood from Arm, Left Updated: 09/04/21 0945     Sodium 132 mmol/L      Potassium 3 9 mmol/L      Chloride 99 mmol/L      CO2 28 mmol/L      ANION GAP 5 mmol/L      BUN 15 mg/dL      Creatinine 0 86 mg/dL      Glucose 91 mg/dL      Calcium 9 0 mg/dL      Corrected Calcium 10 4 mg/dL      AST 38 U/L      ALT 27 U/L      Alkaline Phosphatase 167 U/L      Total Protein 6 7 g/dL      Albumin 2 3 g/dL      Total Bilirubin 0 26 mg/dL      eGFR 68 ml/min/1 73sq m     Narrative:      [place] guidelines for Chronic Kidney Disease (CKD):     Stage 1 with normal or high GFR (GFR > 90 mL/min/1 73 square meters)    Stage 2 Mild CKD (GFR = 60-89 mL/min/1 73 square meters)    Stage 3A Moderate CKD (GFR = 45-59 mL/min/1 73 square meters)    Stage 3B Moderate CKD (GFR = 30-44 mL/min/1 73 square meters)    Stage 4 Severe CKD (GFR = 15-29 mL/min/1 73 square meters)    Stage 5 End Stage CKD (GFR <15 mL/min/1 73 square meters)  Note: GFR calculation is accurate only with a steady state creatinine    CBC and differential [[id number]]  (Abnormal) Collected: 09/04/21 0915    Lab Status: Final result Specimen: Blood from Arm, Left Updated: 09/04/21 0924     WBC 6 76 Thousand/uL      RBC 4 75 Million/uL      Hemoglobin 12 9 g/dL      Hematocrit 39 7 %      MCV 84 fL      MCH 27 2 pg      MCHC 32 5 g/dL      RDW 13 3 %      MPV 9 3 fL      Platelets 383 Thousands/uL      nRBC 0 /100 WBCs      Neutrophils Relative 65 %      Immat GRANS % 1 %      Lymphocytes Relative 17 %      Monocytes Relative 15 %      Eosinophils Relative 1 %      Basophils Relative 1 %      Neutrophils Absolute 4 52 Thousands/µL      Immature Grans Absolute 0 04 Thousand/uL      Lymphocytes Absolute 1 12 Thousands/µL      Monocytes Absolute 0 98 Thousand/µL      Eosinophils Absolute 0 05 Thousand/µL      Basophils Absolute 0 05 Thousands/µL                  NM lung perfusion imaging   Final Result by Nathaly Lopez MD (09/04 2012)      The probability for pulmonary embolus is likely high  I personally discussed this study with LUIZ Turner on 9/4/2021 at 8:12 PM                      Workstation performed: [id number]         IR INPATIENT Paracentesis   Final Result by Harris Smith DO (09/04 1116)   Paracentesis  _________________________________________________________________   COMPARISON: CT abdomen and pelvis 9/2/2021      PROCEDURE DETAILS:    Operators: Dr Seth Baumgarten   Anesthesia: Local   Medications: 1% lidocaine      COMMENTS:   A preprocedure timeout was performed per [facility] protocol  The right abdomen was prepped and draped in the usual sterile fashion  5-[place] Yueh catheter was advanced under continuous ultrasound guidance into ascites  Following drainage, the skin was cleansed and sterile dressings were applied  Workstation performed: [id number]         XR chest 2 views   Final Result by Anton Grajeda MD (09/05 1057)      Mild bibasilar atelectasis or scarring  Small left pleural effusion                    Workstation performed: [id number]               Procedures  Procedures      ED Course  ED Course as of Sep 05 1652   Sat Sep 04, 2021   1018 XR chest 2 views                                 Zayda Navarro' Criteria for PE      Most Recent Value   Amado' Criteria for PE   Clinical signs and symptoms of DVT  3 Filed at: 09/04/2021 6313   PE is primary diagnosis or equally likely  0 Filed at: 09/04/2021 0842   HR >100  0 Filed at: 09/04/2021 0842   Immobilization at least 3 days or Surgery in the previous 4 weeks  0 Filed at: 09/04/2021 5952 Previous, objectively diagnosed PE or DVT  1 5 Filed at: 09/04/2021 0842   Hemoptysis  0 Filed at: 09/04/2021 9443   Malignancy with treatment within 6 months or palliative  1 Filed at: 09/04/2021 2289   Wells' Criteria Total  5 5 Filed at: 09/04/2021 7061            MDM  Number of Diagnoses or Management Options  Abdominal distension: established and worsening  Ascites: established and worsening  DVT (deep venous thrombosis) ([facility] [address] ): established and improving  Shortness of breath: new and requires workup  Diagnosis management comments: Abdominal Distension  - Known ascites from prior ED visit  - Sent for IR evaluation and possible paracentesis in IR  - Uterine Cancer with node involvement  - Now has SOB and Reflux  A/P  - Consult IR physician for potential IR paracentesis today  - Send for first time paracentesis labs of peritoneal fluid  - If IR is unable to perform procedure today I will offer an ED paracentesis as well    SOB  - Worsening over past several days  - Patient has known DVTs  - Has been started on Eliquis but only taken one dose thus far  - Active malignancy  - High risk Well's  - Contrast Dye allergy  A/P  - CTA Chest PE protocol with pretreat for allergy or V/Q scan  - Troponin  - CBC, CMP for probable admission    REASSESSMENT: Patient would prefer not to undergo pretreatment for contrast dye allergy and would instead elect to get V/Q scan to r/o PE  Paracentesis completed without complication in IR and patient states that her symptoms are significantly improved  She however still may have a pulmonary embolus  Since she cannot get a stat scan in the ED, she will need to be admitted for further study with possible V/Q scan for r/o PE          Amount and/or Complexity of Data Reviewed  Clinical lab tests: ordered and reviewed  Tests in the radiology section of CPT®: ordered  Tests in the medicine section of CPT®: ordered and reviewed  Decide to obtain previous medical records or to obtain history from someone other than the patient: yes  Discuss the patient with other providers: yes        Disposition  Final diagnoses:   Ascites   Abdominal distension   Shortness of breath   DVT (deep venous thrombosis) ([facility] [address] )     Time reflects when diagnosis was documented in both MDM as applicable and the Disposition within this note     Time User Action Codes Description Comment    9/4/2021  9:00 AM Kurt Radha Add [R18 8] Ascites     9/4/2021  1:11 PM Kurt Radha Add [R14 0] Abdominal distension     9/4/2021  1:11 PM Kurt Radha Add [R06 02] Shortness of breath     9/4/2021  1:12 PM Kurt Radha Add [I82 409] DVT (deep venous thrombosis) [facility])       ED Disposition     ED Disposition Condition Date/Time Comment    Admit Stable Sat Sep 4, 2021  1:12 PM Case was discussed with Dr Nicole Watts and the patient's admission status was agreed to be Admission Status: observation status to the service of Dr Nicole Watts   Follow-up Information    None         Discharge Medication List as of 9/5/2021 12:36 PM      CONTINUE these medications which have NOT CHANGED    Details   acetaminophen (TYLENOL) 500 mg tablet Take 500 mg by mouth every 6 (six) hours as needed for mild pain, Historical Med      apixaban (ELIQUIS) 5 mg Multiple Dosages:Starting Wed 9/1/2021, Until Tue 9/7/2021 at 2359, THEN Starting Wed 9/8/2021, Until Thu 9/30/2021 at 2359Take 2 tablets (10 mg total) by mouth 2 (two) times a day for 7 days, THEN 1 tablet (5 mg total) 2 (two) times a day for 23 days   , Normal      Ascorbic Acid (vitamin C) 1000 MG tablet Take 1,000 mg by mouth daily, Historical Med      azelastine (OPTIVAR) 0 05 % ophthalmic solution Administer 1 drop to both eyes daily as needed, Historical Med      Calcium Carb-Cholecalciferol (CALCIUM 1000 + D PO) Take 2,000 Units by mouth  , Historical Med      Cholecalciferol (Vitamin D3) 25 MCG (1000 UT) CAPS Take 1 capsule by mouth 2 (two) times a day, Historical Med      Cranberry 1000 MG CAPS Take by mouth, Historical Med      diphenhydrAMINE HCl (BENADRYL ALLERGY PO) Take by mouth, Historical Med      LUTEIN PO Take 1 tablet by mouth daily, Historical Med      metroNIDAZOLE (METROCREAM) 0 75 % cream Apply topically 2 (two) times a day, Historical Med      ondansetron (ZOFRAN) 8 mg tablet Take 1 tablet (8 mg total) by mouth every 8 (eight) hours as needed for nausea or vomiting, Starting Wed 7/7/2021, Normal      Probiotic Product (CULTURELLE PROBIOTICS PO) Take by mouth, Historical Med      vitamin E, tocopherol, 1,000 units capsule Take 1,000 Units by mouth daily, Historical Med      Vitamins A & D (VITAMIN A & D PO) Take by mouth, Historical Med      LORazepam (ATIVAN) 1 mg tablet Take 1 tablet (1 mg total) by mouth every 6 (six) hours as needed for anxiety (or nausea), Starting Wed 7/7/2021, Normal      naloxone (NARCAN) 4 mg/0 1 mL nasal spray Administer 1 spray into a nostril  If no response after 2-3 minutes, give another dose in the other nostril using a new spray , Normal      oxyCODONE (ROXICODONE) 5 mg immediate release tablet 1 -2 tabs by mouth every 4-6 hour as needed, Normal      VITAMIN K PO Take 50 mcg by mouth daily , Historical Med         STOP taking these medications       methylPREDNISolone 4 MG tablet therapy pack Comments:   Reason for Stopping:         nitrofurantoin (MACROBID) 100 mg capsule Comments:   Reason for Stopping:             Outpatient Discharge Orders   Ambulatory referral to Pulmonology   Standing Status: Future Standing Exp  Date: 09/05/22      Discharge Diet     Activity as tolerated       PDMP Review       Value Time User    PDMP Reviewed  Yes 7/7/2021 10:06 AM LAUREL Cantu           ED Provider  Attending physically available and evaluated Katlyn Gordon I managed the patient along with the ED Attending      Electronically Signed by         Noel Ray MD  09/05/21 9694

## (undated) DEVICE — ULTRASOUND GEL STERILE FOIL PK

## (undated) DEVICE — GLOVE INDICATOR PI UNDERGLOVE SZ 7 BLUE

## (undated) DEVICE — VISUALIZATION SYSTEM: Brand: CLEARIFY

## (undated) DEVICE — ADHESIVE SKIN HIGH VISCOSITY EXOFIN 1ML

## (undated) DEVICE — PROGRASP FORCEPS: Brand: ENDOWRIST

## (undated) DEVICE — CHLORAPREP HI-LITE 26ML ORANGE

## (undated) DEVICE — MINOR PROCEDURE DRAPE: Brand: CONVERTORS

## (undated) DEVICE — ENDOPATH PNEUMONEEDLE INSUFFLATION NEEDLES WITH LUER LOCK CONNECTORS 120MM: Brand: ENDOPATH

## (undated) DEVICE — SUT VICRYL 3-0 SH 27 IN J416H

## (undated) DEVICE — COLUMN DRAPE

## (undated) DEVICE — 40595 XL TRENDELENBURG POSITIONING KIT: Brand: 40595 XL TRENDELENBURG POSITIONING KIT

## (undated) DEVICE — CHLORHEXIDINE 4PCT 4 OZ

## (undated) DEVICE — TRAY FOLEY 16FR URIMETER SILICONE SURESTEP

## (undated) DEVICE — MAYO STAND COVER: Brand: CONVERTORS

## (undated) DEVICE — SUT STRATAFIX SPIRAL 2-0 CT-1 30 CM SXPP1B410

## (undated) DEVICE — AIRSEAL TUBE SMOKE EVAC LUMENX3 FILTERED

## (undated) DEVICE — LUBRICANT INST ELECTROLUBE ANTISTK WO PAD

## (undated) DEVICE — COVER PROBE INTRAOPERATIVE 6 X 96 IN

## (undated) DEVICE — NEEDLE SPINAL 22G X 3.5IN  QUINCKE

## (undated) DEVICE — SYRINGE 20ML LL

## (undated) DEVICE — BAG DECANTER

## (undated) DEVICE — SUT MONOCRYL PLUS 4-0 PS-2 18 IN MCP496G

## (undated) DEVICE — VESSEL SEALER EXTEND: Brand: ENDOWRIST

## (undated) DEVICE — SPONGE 4 X 4 XRAY 16 PLY STRL LF RFD

## (undated) DEVICE — DRAPE TOWEL: Brand: CONVERTORS

## (undated) DEVICE — SYRINGE 5ML LL

## (undated) DEVICE — MEGA SUTURECUT ND: Brand: ENDOWRIST

## (undated) DEVICE — ARM DRAPE

## (undated) DEVICE — BLADELESS OBTURATOR: Brand: WECK VISTA

## (undated) DEVICE — NEEDLE 25G X 1 1/2

## (undated) DEVICE — INTENDED FOR TISSUE SEPARATION, AND OTHER PROCEDURES THAT REQUIRE A SHARP SURGICAL BLADE TO PUNCTURE OR CUT.: Brand: BARD-PARKER SAFETY BLADES SIZE 15, STERILE

## (undated) DEVICE — TROCAR PORT ACCESS 12 X120MM W/BLDLS OPTICAL TIP AIRSEAL

## (undated) DEVICE — GAUZE SPONGES,USP TYPE VII GAUZE, 12 PLY: Brand: CURITY

## (undated) DEVICE — TIP COVER ACCESSORY

## (undated) DEVICE — NEEDLE 18 G X 1 1/2 SAFETY

## (undated) DEVICE — VIAL DECANTER

## (undated) DEVICE — CANNULA SEAL

## (undated) DEVICE — SPECIMEN TRAP: Brand: ARGYLE

## (undated) DEVICE — MONOPOLAR CURVED SCISSORS: Brand: ENDOWRIST

## (undated) DEVICE — GLOVE INDICATOR PI UNDERGLOVE SZ 7.5 BLUE

## (undated) DEVICE — GLOVE SRG BIOGEL 6.5

## (undated) DEVICE — STERILE ICS MINOR PACK: Brand: CARDINAL HEALTH

## (undated) DEVICE — KIT, BETHLEHEM ROBOTIC PROST: Brand: CARDINAL HEALTH

## (undated) DEVICE — GLOVE PI ULTRA TOUCH SZ.7.5

## (undated) DEVICE — INTENDED FOR TISSUE SEPARATION, AND OTHER PROCEDURES THAT REQUIRE A SHARP SURGICAL BLADE TO PUNCTURE OR CUT.: Brand: BARD-PARKER SAFETY BLADES SIZE 11, STERILE

## (undated) DEVICE — PREMIUM DRY TRAY LF: Brand: MEDLINE INDUSTRIES, INC.